# Patient Record
Sex: MALE | Race: OTHER | NOT HISPANIC OR LATINO | URBAN - METROPOLITAN AREA
[De-identification: names, ages, dates, MRNs, and addresses within clinical notes are randomized per-mention and may not be internally consistent; named-entity substitution may affect disease eponyms.]

---

## 2021-09-03 ENCOUNTER — INPATIENT (INPATIENT)
Facility: HOSPITAL | Age: 81
LOS: 1 days | Discharge: HOME | DRG: 603 | End: 2021-09-05
Payer: MEDICARE

## 2021-09-03 VITALS
TEMPERATURE: 98 F | DIASTOLIC BLOOD PRESSURE: 73 MMHG | SYSTOLIC BLOOD PRESSURE: 141 MMHG | OXYGEN SATURATION: 100 % | WEIGHT: 186.95 LBS | HEART RATE: 68 BPM | RESPIRATION RATE: 16 BRPM

## 2021-09-03 DIAGNOSIS — F32.9 MAJOR DEPRESSIVE DISORDER, SINGLE EPISODE, UNSPECIFIED: ICD-10-CM

## 2021-09-03 DIAGNOSIS — Z98.890 OTHER SPECIFIED POSTPROCEDURAL STATES: Chronic | ICD-10-CM

## 2021-09-03 DIAGNOSIS — I10 ESSENTIAL (PRIMARY) HYPERTENSION: ICD-10-CM

## 2021-09-03 DIAGNOSIS — I25.10 ATHEROSCLEROTIC HEART DISEASE OF NATIVE CORONARY ARTERY WITHOUT ANGINA PECTORIS: ICD-10-CM

## 2021-09-03 DIAGNOSIS — L03.116 CELLULITIS OF LEFT LOWER LIMB: ICD-10-CM

## 2021-09-03 DIAGNOSIS — G47.30 SLEEP APNEA, UNSPECIFIED: ICD-10-CM

## 2021-09-03 DIAGNOSIS — E87.6 HYPOKALEMIA: ICD-10-CM

## 2021-09-03 DIAGNOSIS — D64.9 ANEMIA, UNSPECIFIED: ICD-10-CM

## 2021-09-03 DIAGNOSIS — Z29.9 ENCOUNTER FOR PROPHYLACTIC MEASURES, UNSPECIFIED: ICD-10-CM

## 2021-09-03 DIAGNOSIS — K21.9 GASTRO-ESOPHAGEAL REFLUX DISEASE WITHOUT ESOPHAGITIS: ICD-10-CM

## 2021-09-03 DIAGNOSIS — N18.9 CHRONIC KIDNEY DISEASE, UNSPECIFIED: ICD-10-CM

## 2021-09-03 DIAGNOSIS — I50.9 HEART FAILURE, UNSPECIFIED: ICD-10-CM

## 2021-09-03 DIAGNOSIS — M19.90 UNSPECIFIED OSTEOARTHRITIS, UNSPECIFIED SITE: ICD-10-CM

## 2021-09-03 LAB
ALBUMIN SERPL ELPH-MCNC: 3.3 G/DL — SIGNIFICANT CHANGE UP (ref 3.3–5)
ALP SERPL-CCNC: 61 U/L — SIGNIFICANT CHANGE UP (ref 40–120)
ALT FLD-CCNC: 22 U/L — SIGNIFICANT CHANGE UP (ref 10–45)
ANION GAP SERPL CALC-SCNC: 10 MMOL/L — SIGNIFICANT CHANGE UP (ref 5–17)
APPEARANCE UR: CLEAR — SIGNIFICANT CHANGE UP
APTT BLD: 33.5 SEC — SIGNIFICANT CHANGE UP (ref 27.5–35.5)
AST SERPL-CCNC: 26 U/L — SIGNIFICANT CHANGE UP (ref 10–40)
BACTERIA # UR AUTO: SIGNIFICANT CHANGE UP /HPF
BASOPHILS # BLD AUTO: 0.02 K/UL — SIGNIFICANT CHANGE UP (ref 0–0.2)
BASOPHILS NFR BLD AUTO: 0.2 % — SIGNIFICANT CHANGE UP (ref 0–2)
BILIRUB SERPL-MCNC: 0.4 MG/DL — SIGNIFICANT CHANGE UP (ref 0.2–1.2)
BILIRUB UR-MCNC: NEGATIVE — SIGNIFICANT CHANGE UP
BUN SERPL-MCNC: 13 MG/DL — SIGNIFICANT CHANGE UP (ref 7–23)
CALCIUM SERPL-MCNC: 8.5 MG/DL — SIGNIFICANT CHANGE UP (ref 8.4–10.5)
CHLORIDE SERPL-SCNC: 102 MMOL/L — SIGNIFICANT CHANGE UP (ref 96–108)
CO2 SERPL-SCNC: 29 MMOL/L — SIGNIFICANT CHANGE UP (ref 22–31)
COLOR SPEC: YELLOW — SIGNIFICANT CHANGE UP
CREAT SERPL-MCNC: 1.56 MG/DL — HIGH (ref 0.5–1.3)
DIFF PNL FLD: ABNORMAL
EOSINOPHIL # BLD AUTO: 0.11 K/UL — SIGNIFICANT CHANGE UP (ref 0–0.5)
EOSINOPHIL NFR BLD AUTO: 1.1 % — SIGNIFICANT CHANGE UP (ref 0–6)
EPI CELLS # UR: SIGNIFICANT CHANGE UP /HPF (ref 0–5)
GLUCOSE SERPL-MCNC: 122 MG/DL — HIGH (ref 70–99)
GLUCOSE UR QL: NEGATIVE — SIGNIFICANT CHANGE UP
HCT VFR BLD CALC: 35.7 % — LOW (ref 39–50)
HGB BLD-MCNC: 11.7 G/DL — LOW (ref 13–17)
IMM GRANULOCYTES NFR BLD AUTO: 0.5 % — SIGNIFICANT CHANGE UP (ref 0–1.5)
INR BLD: 1.25 — HIGH (ref 0.88–1.16)
KETONES UR-MCNC: NEGATIVE — SIGNIFICANT CHANGE UP
LACTATE SERPL-SCNC: 1.5 MMOL/L — SIGNIFICANT CHANGE UP (ref 0.5–2)
LEUKOCYTE ESTERASE UR-ACNC: NEGATIVE — SIGNIFICANT CHANGE UP
LYMPHOCYTES # BLD AUTO: 1.41 K/UL — SIGNIFICANT CHANGE UP (ref 1–3.3)
LYMPHOCYTES # BLD AUTO: 14.7 % — SIGNIFICANT CHANGE UP (ref 13–44)
MAGNESIUM SERPL-MCNC: 1.6 MG/DL — SIGNIFICANT CHANGE UP (ref 1.6–2.6)
MCHC RBC-ENTMCNC: 29 PG — SIGNIFICANT CHANGE UP (ref 27–34)
MCHC RBC-ENTMCNC: 32.8 GM/DL — SIGNIFICANT CHANGE UP (ref 32–36)
MCV RBC AUTO: 88.6 FL — SIGNIFICANT CHANGE UP (ref 80–100)
MONOCYTES # BLD AUTO: 0.69 K/UL — SIGNIFICANT CHANGE UP (ref 0–0.9)
MONOCYTES NFR BLD AUTO: 7.2 % — SIGNIFICANT CHANGE UP (ref 2–14)
NEUTROPHILS # BLD AUTO: 7.33 K/UL — SIGNIFICANT CHANGE UP (ref 1.8–7.4)
NEUTROPHILS NFR BLD AUTO: 76.3 % — SIGNIFICANT CHANGE UP (ref 43–77)
NITRITE UR-MCNC: NEGATIVE — SIGNIFICANT CHANGE UP
NRBC # BLD: 0 /100 WBCS — SIGNIFICANT CHANGE UP (ref 0–0)
NT-PROBNP SERPL-SCNC: 609 PG/ML — HIGH (ref 0–300)
PH UR: 5.5 — SIGNIFICANT CHANGE UP (ref 5–8)
PLATELET # BLD AUTO: 236 K/UL — SIGNIFICANT CHANGE UP (ref 150–400)
POTASSIUM SERPL-MCNC: 3.2 MMOL/L — LOW (ref 3.5–5.3)
POTASSIUM SERPL-SCNC: 3.2 MMOL/L — LOW (ref 3.5–5.3)
PROT SERPL-MCNC: 7.3 G/DL — SIGNIFICANT CHANGE UP (ref 6–8.3)
PROT UR-MCNC: 30 MG/DL
PROTHROM AB SERPL-ACNC: 14.8 SEC — HIGH (ref 10.6–13.6)
RBC # BLD: 4.03 M/UL — LOW (ref 4.2–5.8)
RBC # FLD: 14.1 % — SIGNIFICANT CHANGE UP (ref 10.3–14.5)
RBC CASTS # UR COMP ASSIST: < 5 /HPF — SIGNIFICANT CHANGE UP
SARS-COV-2 RNA SPEC QL NAA+PROBE: NEGATIVE — SIGNIFICANT CHANGE UP
SODIUM SERPL-SCNC: 141 MMOL/L — SIGNIFICANT CHANGE UP (ref 135–145)
SP GR SPEC: 1.02 — SIGNIFICANT CHANGE UP (ref 1–1.03)
TROPONIN T SERPL-MCNC: 0.02 NG/ML — HIGH (ref 0–0.01)
UROBILINOGEN FLD QL: 1 E.U./DL — SIGNIFICANT CHANGE UP
WBC # BLD: 9.61 K/UL — SIGNIFICANT CHANGE UP (ref 3.8–10.5)
WBC # FLD AUTO: 9.61 K/UL — SIGNIFICANT CHANGE UP (ref 3.8–10.5)
WBC UR QL: < 5 /HPF — SIGNIFICANT CHANGE UP

## 2021-09-03 PROCEDURE — 93010 ELECTROCARDIOGRAM REPORT: CPT

## 2021-09-03 PROCEDURE — 93970 EXTREMITY STUDY: CPT | Mod: 26

## 2021-09-03 PROCEDURE — 71045 X-RAY EXAM CHEST 1 VIEW: CPT | Mod: 26

## 2021-09-03 PROCEDURE — 99223 1ST HOSP IP/OBS HIGH 75: CPT | Mod: GC,AI

## 2021-09-03 PROCEDURE — 99285 EMERGENCY DEPT VISIT HI MDM: CPT

## 2021-09-03 RX ORDER — METOPROLOL TARTRATE 50 MG
50 TABLET ORAL EVERY 12 HOURS
Refills: 0 | Status: DISCONTINUED | OUTPATIENT
Start: 2021-09-03 | End: 2021-09-05

## 2021-09-03 RX ORDER — DOXEPIN HCL 100 MG
10 CAPSULE ORAL
Qty: 0 | Refills: 0 | DISCHARGE

## 2021-09-03 RX ORDER — METOPROLOL TARTRATE 50 MG
50 TABLET ORAL
Qty: 0 | Refills: 0 | DISCHARGE

## 2021-09-03 RX ORDER — BACITRACIN ZINC 500 UNIT/G
1 OINTMENT IN PACKET (EA) TOPICAL EVERY 12 HOURS
Refills: 0 | Status: DISCONTINUED | OUTPATIENT
Start: 2021-09-03 | End: 2021-09-05

## 2021-09-03 RX ORDER — AMLODIPINE BESYLATE 2.5 MG/1
10 TABLET ORAL EVERY 24 HOURS
Refills: 0 | Status: DISCONTINUED | OUTPATIENT
Start: 2021-09-04 | End: 2021-09-05

## 2021-09-03 RX ORDER — POLYETHYLENE GLYCOL 3350 17 G/17G
17 POWDER, FOR SOLUTION ORAL EVERY 12 HOURS
Refills: 0 | Status: DISCONTINUED | OUTPATIENT
Start: 2021-09-03 | End: 2021-09-05

## 2021-09-03 RX ORDER — OMEGA-3 ACID ETHYL ESTERS 1 G
1 CAPSULE ORAL
Qty: 0 | Refills: 0 | DISCHARGE

## 2021-09-03 RX ORDER — CEFAZOLIN SODIUM 1 G
2000 VIAL (EA) INJECTION EVERY 8 HOURS
Refills: 0 | Status: DISCONTINUED | OUTPATIENT
Start: 2021-09-03 | End: 2021-09-05

## 2021-09-03 RX ORDER — POTASSIUM CHLORIDE 20 MEQ
40 PACKET (EA) ORAL ONCE
Refills: 0 | Status: COMPLETED | OUTPATIENT
Start: 2021-09-03 | End: 2021-09-03

## 2021-09-03 RX ORDER — ATORVASTATIN CALCIUM 80 MG/1
40 TABLET, FILM COATED ORAL AT BEDTIME
Refills: 0 | Status: DISCONTINUED | OUTPATIENT
Start: 2021-09-03 | End: 2021-09-05

## 2021-09-03 RX ORDER — INFLUENZA VIRUS VACCINE 15; 15; 15; 15 UG/.5ML; UG/.5ML; UG/.5ML; UG/.5ML
0.5 SUSPENSION INTRAMUSCULAR ONCE
Refills: 0 | Status: DISCONTINUED | OUTPATIENT
Start: 2021-09-03 | End: 2021-09-03

## 2021-09-03 RX ORDER — PIPERACILLIN AND TAZOBACTAM 4; .5 G/20ML; G/20ML
3.38 INJECTION, POWDER, LYOPHILIZED, FOR SOLUTION INTRAVENOUS ONCE
Refills: 0 | Status: COMPLETED | OUTPATIENT
Start: 2021-09-03 | End: 2021-09-03

## 2021-09-03 RX ORDER — OMEPRAZOLE 10 MG/1
1 CAPSULE, DELAYED RELEASE ORAL
Qty: 0 | Refills: 0 | DISCHARGE

## 2021-09-03 RX ORDER — SENNA PLUS 8.6 MG/1
1 TABLET ORAL AT BEDTIME
Refills: 0 | Status: DISCONTINUED | OUTPATIENT
Start: 2021-09-03 | End: 2021-09-05

## 2021-09-03 RX ORDER — ASPIRIN/CALCIUM CARB/MAGNESIUM 324 MG
1 TABLET ORAL
Qty: 0 | Refills: 0 | DISCHARGE

## 2021-09-03 RX ORDER — ATORVASTATIN CALCIUM 80 MG/1
1 TABLET, FILM COATED ORAL
Qty: 0 | Refills: 0 | DISCHARGE

## 2021-09-03 RX ORDER — INFLUENZA VIRUS VACCINE 15; 15; 15; 15 UG/.5ML; UG/.5ML; UG/.5ML; UG/.5ML
0.7 SUSPENSION INTRAMUSCULAR ONCE
Refills: 0 | Status: DISCONTINUED | OUTPATIENT
Start: 2021-09-03 | End: 2021-09-05

## 2021-09-03 RX ORDER — VANCOMYCIN HCL 1 G
1000 VIAL (EA) INTRAVENOUS ONCE
Refills: 0 | Status: COMPLETED | OUTPATIENT
Start: 2021-09-03 | End: 2021-09-03

## 2021-09-03 RX ORDER — ACETAMINOPHEN 500 MG
650 TABLET ORAL EVERY 6 HOURS
Refills: 0 | Status: DISCONTINUED | OUTPATIENT
Start: 2021-09-03 | End: 2021-09-05

## 2021-09-03 RX ORDER — ENOXAPARIN SODIUM 100 MG/ML
40 INJECTION SUBCUTANEOUS EVERY 24 HOURS
Refills: 0 | Status: DISCONTINUED | OUTPATIENT
Start: 2021-09-03 | End: 2021-09-05

## 2021-09-03 RX ORDER — PANTOPRAZOLE SODIUM 20 MG/1
40 TABLET, DELAYED RELEASE ORAL
Refills: 0 | Status: DISCONTINUED | OUTPATIENT
Start: 2021-09-03 | End: 2021-09-05

## 2021-09-03 RX ORDER — ASPIRIN/CALCIUM CARB/MAGNESIUM 324 MG
325 TABLET ORAL DAILY
Refills: 0 | Status: DISCONTINUED | OUTPATIENT
Start: 2021-09-03 | End: 2021-09-05

## 2021-09-03 RX ADMIN — Medication 250 MILLIGRAM(S): at 14:30

## 2021-09-03 RX ADMIN — SENNA PLUS 1 TABLET(S): 8.6 TABLET ORAL at 21:20

## 2021-09-03 RX ADMIN — ATORVASTATIN CALCIUM 40 MILLIGRAM(S): 80 TABLET, FILM COATED ORAL at 21:20

## 2021-09-03 RX ADMIN — Medication 40 MILLIEQUIVALENT(S): at 14:31

## 2021-09-03 RX ADMIN — PIPERACILLIN AND TAZOBACTAM 200 GRAM(S): 4; .5 INJECTION, POWDER, LYOPHILIZED, FOR SOLUTION INTRAVENOUS at 14:31

## 2021-09-03 RX ADMIN — ENOXAPARIN SODIUM 40 MILLIGRAM(S): 100 INJECTION SUBCUTANEOUS at 19:14

## 2021-09-03 RX ADMIN — Medication 100 MILLIGRAM(S): at 18:45

## 2021-09-03 RX ADMIN — Medication 50 MILLIGRAM(S): at 19:14

## 2021-09-03 RX ADMIN — Medication 0.1 MILLIGRAM(S): at 21:20

## 2021-09-03 RX ADMIN — Medication 1 APPLICATION(S): at 22:04

## 2021-09-03 NOTE — H&P ADULT - PROBLEM SELECTOR PLAN 5
Pt w/ hx of chronic renal insufficiency (unknown baseline Cr) and Cr 1.56 on admission. UA negative. b/l LE edema L>R. no JVD or hepatojuglar reflux.   - monitor Cr  - urine lytes Pt w/ hx of chronic renal insufficiency (unknown baseline Cr, family thinks it may be ~1.5) and Cr 1.56 on admission. UA negative. b/l LE edema L>R. no JVD or hepatojuglar reflux. Likely chronic but further evaluation for possible acute change.  - monitor Cr  - f/u urine lytes

## 2021-09-03 NOTE — H&P ADULT - PROBLEM SELECTOR PLAN 1
Pt presents w/ left LE pain and worsened b/l LE swelling. Found to have LLE nonpurulent cellulitis. US LE doppler negative. Received vanc/zosyn x1 in ED.   - start cefazolin   - monitor   - bacitracin to ulcer of lateral ankle Pt presents w/ left LE pain and worsened b/l LE swelling. Found to have LLE nonpurulent cellulitis. US LE doppler negative. Received vanc/zosyn x1 in ED.   - start cefazolin   - f/u BCx collected in ED 9/3  - monitor   - bacitracin to ulcer of lateral ankle Pt presents w/ left LE pain and worsened b/l LE swelling. Found to have LLE nonpurulent cellulitis. US LE doppler negative. Received vanc/zosyn x1 in ED.   - start cefazolin 2g IV q8h  - f/u BCx collected in ED 9/3  - monitor   - bacitracin to ulcer of lateral ankle Pt presents w/ left LE pain and worsened b/l LE swelling. Found to have LLE nonpurulent cellulitis. US LE doppler negative. Received vanc/zosyn x1 in ED.   - start cefazolin 2g IV q8h (9/3-)  - f/u BCx collected in ED 9/3  - monitor cellulitis improvement while on antibiotics  - bacitracin ointment to small ulcer of left lateral ankle

## 2021-09-03 NOTE — ED ADULT NURSE NOTE - OBJECTIVE STATEMENT
Pt c/o worsening swelling in bilateral lower extremities for 1 week, w/ pain in both legs. States swelling has been on and off for "years". Pt endorses chronic pain with arthritis in both legs but as per daughter, worsening pain with standing for long periods of time. Pt uses walker at baseline.     Denies any HA, dizziness, CP, SOB, N/V, urinary symptoms. Pt c/o worsening swelling in bilateral lower extremities for 1 week, w/ pain in both legs. States swelling has been on and off for "years". Pt endorses chronic pain with arthritis in both legs but as per daughter, worsening pain with standing for long periods of time. Pt uses walker at baseline.     Of note: pt used BioFreeze on bilateral legs for arthritis about 3-4 days ago.     Denies any HA, dizziness, CP, SOB, N/V, urinary symptoms.

## 2021-09-03 NOTE — H&P ADULT - ATTENDING COMMENTS
Patient was seen and examined with the resident team today.  I agree with Dr. Lees's assessment and plan with the following exceptions/additions:     Briefly, 81yo gentleman, former smoker, with a PMH of HTN, HLD, GERD, CAD, CHF NOS, CKD (baseline Cr 1.4-1.5), chronic hypokalemia, BPH, chronic RUE weakness/contracture and sleeping disorder NOS (on CPAP) who p/w BLE swelling (L>R), found to have LLE cellulitis.    #LLE cellulitis - agree w/Cefazolin, f/u blood cultures  #Refractory HTN, ?HFpEF - on 4 agents at home, resume all but HCTZ   #CAD - c/w home ASA and statin   #CKD NOS - partner "thinks" his baseline Cr approx 1.5, will hold HCTZ and f/u AM Cr before restarting HCTZ in case he has an KALA on CKD  #Sleep Disorder - can offer CPAP while in-house  #DVT PPx - Lovenox  #Dispo - likely home tomorrow on PO abx if skin exam improving (pt and partner amenable to this plan)    Rosmery Zarate  [phone]

## 2021-09-03 NOTE — H&P ADULT - PROBLEM SELECTOR PLAN 12
F: tolerating PO, no IVF  E: replete K<4, Mg<2  N: DASH    VTE Prophylaxis: Lovenox 40mg q24h  GI: protonix PO  C: Full Code  D: f/u PT eval F: tolerating PO, no IVF  E: replete K<4, Mg<2  N: DASH    VTE Prophylaxis: Lovenox 40mg q24h  GI: protonix PO  C: Full Code  D: home

## 2021-09-03 NOTE — H&P ADULT - PROBLEM SELECTOR PLAN 11
F: tolerating PO, no IVF  E: replete K<4, Mg<2  N: DASH    VTE Prophylaxis: Lovenox 40mg q24h  GI: protonix PO  C: Full Code  D: f/u PT eval Pt reports that last Friday he started using a device while sleeping because he has difficulty sleeping. He is not able to specify what type of device he uses. He does not use the device every day because he is not comfortable using it while he sleeps.  - offer CPAP use at night Pt reports that last Friday he received a CPAP device because he has difficulty sleeping. He does not use the device every day because he is not comfortable using it while he sleeps.  - offer CPAP use at night Pt reports that last Friday he received a CPAP device because he has difficulty sleeping. He does not use the device every day because he is not comfortable using it while he sleeps.  - patient unsure of CPAP settings  - instruct pt to follow up with his sleep specialist

## 2021-09-03 NOTE — H&P ADULT - NSICDXPASTMEDICALHX_GEN_ALL_CORE_FT
PAST MEDICAL HISTORY:  Arthritis     CAD (coronary artery disease)     Cellulitis, leg     Chronic CHF     Chronic renal insufficiency     Constipation     Depression     GERD (gastroesophageal reflux disease)     HTN (hypertension)       Anxiety     CAD (coronary artery disease)     Chronic renal insufficiency     Chronic systolic congestive heart failure     Constipation     Depression     GERD (gastroesophageal reflux disease)     History of BPH     HLD (hyperlipidemia)     HTN (hypertension)

## 2021-09-03 NOTE — H&P ADULT - PROBLEM SELECTOR PLAN 10
Pt w/ hx of arthritis and associated pain. Uses biofreeze topical at home on b/l knees. Also w/ b/l shoulder arthritic pain.  - lidocaine topical Pt w/ hx of arthritis and associated pain. Uses biofreeze topical at home on b/l knees. Also w/ b/l shoulder arthritic pain.  - tylenol PO q6 prn for pain

## 2021-09-03 NOTE — H&P ADULT - NSHPLABSRESULTS_GEN_ALL_CORE
LABS:                        11.7   9.61  )-----------( 236      ( 03 Sep 2021 11:25 )             35.7         141  |  102  |  13  ----------------------------<  122<H>  3.2<L>   |  29  |  1.56<H>    Ca    8.5      03 Sep 2021 11:25  Mg     1.6         TPro  7.3  /  Alb  3.3  /  TBili  0.4  /  DBili  x   /  AST  26  /  ALT  22  /  AlkPhos  61  03    PT/INR - ( 03 Sep 2021 11:25 )   PT: 14.8 sec;   INR: 1.25          PTT - ( 03 Sep 2021 11:25 )  PTT:33.5 sec  Urinalysis Basic - ( 03 Sep 2021 12:06 )    Color: Yellow / Appearance: Clear / S.020 / pH: x  Gluc: x / Ketone: NEGATIVE  / Bili: Negative / Urobili: 1.0 E.U./dL   Blood: x / Protein: 30 mg/dL / Nitrite: NEGATIVE   Leuk Esterase: NEGATIVE / RBC: < 5 /HPF / WBC < 5 /HPF   Sq Epi: x / Non Sq Epi: 0-5 /HPF / Bacteria: None /HPF      RADIOLOGY & ADDITIONAL TESTS: Reviewed.    LE doppler b/l: no DVT  CXR: pulmonary edema b/l

## 2021-09-03 NOTE — ED PROVIDER NOTE - NSICDXPASTMEDICALHX_GEN_ALL_CORE_FT
PAST MEDICAL HISTORY:  Anxiety     CAD (coronary artery disease)     Chronic renal insufficiency     Chronic systolic congestive heart failure     Constipation     Depression     GERD (gastroesophageal reflux disease)     History of BPH     HLD (hyperlipidemia)     HTN (hypertension)

## 2021-09-03 NOTE — ED PROVIDER NOTE - CLINICAL SUMMARY MEDICAL DECISION MAKING FREE TEXT BOX
79 y/o M w/presents for swelling of bilat lower extremities, left greater than right, w/ erythema and pain to left lower extremity. Pt reports after he put OTC Biofreeze roll-on on the left knee and leg for pain and states after which, left leg has blister and erythema, and pain and warmth w/ swelling. Order cellulitis workup w/ labs, blood work, blood culture, US and doppler of both legs to rule out DVT. Order CXR. Will give IV Zosyn and vancomycin for infection. Pt to be admitted for IV abx for cellulitis. 81 y/o M w/presents for swelling of bilat lower extremities, left greater than right, w/ erythema and pain to left lower extremity. Pt reports after he put OTC Biofreeze roll-on on the left knee and leg for pain and states after which, left leg has blister and erythema, and pain and warmth w/ swelling. Ordered blood work, blood cultures, US and doppler of both legs to rule out DVT. Order CXR. Will give IV Zosyn and vancomycin for cellulitis. Pt afebrile. Labs/ studies noted. Doppler negative for DVT. Pt admitted for IV abx for cellulitis.

## 2021-09-03 NOTE — H&P ADULT - ASSESSMENT
Patient is an 79yo male w/ PMH of HTN (on metoprolol, clonidine, HCTZ, amlodipine), CHF (unknown baseline EF), CAD (pt denies hx of MI, last cardiac cath 15 yrs ago, on aspirin 325 mg), HLD, GERD, depression and anxiety (on Doxepin), hypokalemia (on K 20mg), constipation (takes a stool softener at home), and chronic renal insufficiency (unknown baseline Cr) who presented for swelling of b/l lower extremities and pain of the left lower extremity for 1 week. Admitted for LLE nonpurulent cellulitis.

## 2021-09-03 NOTE — H&P ADULT - PROBLEM SELECTOR PLAN 7
Pt w/ hx of hypokalemia (takes potassium 20 at home).  - monitor BMP  - replete K as needed Pt w/ hx of hypokalemia (takes potassium 20 at home). Likely 2/2 HCTZ use.  - monitor BMP  - replete K as needed Pt w/ hx of hypokalemia (takes potassium at home). Likely 2/2 HCTZ use.  - monitor BMP  - replete K as needed

## 2021-09-03 NOTE — ED PROVIDER NOTE - CONSTITUTIONAL, MLM
Awake, alert, oriented to person, place, time/situation and in no apparent distress. normal... Awake, alert, oriented and in no apparent distress.

## 2021-09-03 NOTE — ED PROVIDER NOTE - OBJECTIVE STATEMENT
79 y/o M w/ Hx of HTN (on metoprolol, clonidine, HCTZ, amlodipine), HLD (on atorvastatin) , GERD (on Omeprazole), BPH, constipation, CHF, CAD, on aspirin 325 mg daily, depression and anxiety (on Doxepin), hypokalemia (on K 20mg), chronic renal insufficiency, presents today for swelling of bilat lower extremities, left greater than right, w/ erythema and pain to left lower extremity for 3-4 days. Pt states he has chronic arthritis to bilat knees, left greater than right. Complains that knee pain radiated down to leg and put OTC Biofreeze roll-on (Menthol) on the left knee. Also put on left leg due to the radiating pain to left leg. After Biofreeze application, reports leg w/ erythema, blister, oozing clear fluids, and pain and warmth to left lower extremity and swelling. Denies fever, chills. Fully vaccinated for COVID19 (March, 2021). Denies CP, SOB, cough, N/V/D, abd pain, or urinary symptoms. Pt reports he smokes marijuana daily, occasional cigars, past cigarette smoker (more than 15 yrs ago), no other drugs use or alcohol intake.

## 2021-09-03 NOTE — H&P ADULT - NSHPSOCIALHISTORY_GEN_ALL_CORE
Lives in an apartment with his partner Janiya.  Also has a daughter who lives in the area.  Ambulates with a walker and is able to walk multiple blocks without SOB.  Sleeps flat with 1 pillow.  Prescribed a sleep device at home which he received 1 week ago but does not use it every day because it is not comfortable for him.  Denies alcohol use.  Former smoker, stopped 15 years ago, used to smoke 1 PPD for 25yrs.  Occasionally smokes marijuana and denies other drug use.

## 2021-09-03 NOTE — H&P ADULT - PROBLEM SELECTOR PLAN 6
Pt w/ hx of chronic renal insufficiency (unknown baseline Cr) and Hgb 11.7 on admission. No overt signs of bleeding. Likely AOCD.  - Fe studies Pt w/ hx of chronic renal insufficiency (unknown baseline Cr) and Hgb 11.7 on admission. No overt signs of bleeding. Likely AOCD.  - f/u Fe studies

## 2021-09-03 NOTE — H&P ADULT - PROBLEM SELECTOR PLAN 2
Pt w/ hx of HTN and reports previous hospitalizations (20yrs ago) for uncontrolled BP. Home meds: metoprolol 50mg BID, clonidine 0.1mg BID, norvasc 10mg qd, HCTZ 12.5mg qd.  - c/w metoprolol   - c/w clonidine  - c/w norvasc  - c/w HCTZ Pt w/ hx of HTN and reports previous hospitalizations (20yrs ago) for uncontrolled BP. Home meds: metoprolol succinate 50mg BID, clonidine 0.1mg BID, norvasc 10mg qd, HCTZ 12.5mg qd.  - c/w toprol  - c/w clonidine  - c/w norvasc  - c/w HCTZ Pt w/ hx of HTN and reports previous hospitalizations (20yrs ago) for uncontrolled BP. Home meds: metoprolol succinate 50mg BID, clonidine 0.1mg BID, norvasc 10mg qd, HCTZ 12.5mg qd.  - c/w toprol  - c/w clonidine  - c/w norvasc  - HOLD HCTZ in setting of elevated Cr (1.56 today, possible baseline 1.5 as per family)  - f/u AM Cr before restarting HCTZ

## 2021-09-03 NOTE — ED PROVIDER NOTE - MUSCULOSKELETAL, MLM
Bilat lower extremity swelling, left greater than right. Erythema, warmth, and scattered blisters (some weeping clear fluids) noted over left lower leg. Erythema that extends from top of foot  to 2/3 up leg, circumferencial. Good pulses bilat. Compartments soft.  Tender to palpations over left leg. Leg has full ROM. Pt able to ambulate w/ walker and ambulating in ER.  1 cm ulcer noted to be 2 inches above lateral malleolus. Bilat lower extremity swelling, left greater than right. Good pulses bilat. Compartments soft.  Tender to palpations over left leg. Leg has full ROM. Pt able to ambulate w/ walker and ambulating in ER.

## 2021-09-03 NOTE — H&P ADULT - NSHPPHYSICALEXAM_GEN_ALL_CORE
PHYSICAL EXAM:  Constitutional: sitting up in bed, NAD  HEENT: ELIZABETH, oral cavity wnl, poor dentition  Neck: supple, normal thyroid, no cervical lymphadenopathy  Back: no CVA tenderness  Respiratory: few rales b/l bases, no rhonchi  Cardiovascular: RRR, no M/R/G  Gastrointestinal: soft, nontender, normal bowel sounds, no guarding, no rebound  Extremities: moving all extremitiesx4, b/l LE edema L>R  Vascular: 2+ radial, carotid pulses b/l, 2+ DP pulse RLE (unable to assess LLE DP pulse due to swelling)  Neurological: AAOx3, CN II-XII intact, strength 5/5 b/l UE and LE, sensation to light touch intact b/l face/UE/LE  Skin: LLE swelling up to knee and erythema below knee, mildly warm to touch, no drainage, purulence or fluctuance; 2cm circular ulcer of lateral left ankle  Psychiatric: appropriate, calm

## 2021-09-03 NOTE — H&P ADULT - PROBLEM SELECTOR PLAN 3
Pt w/ hx of CHF (unknown baseline EF). Few b/l rales, LE edema L>R, . CXR w/ b/l pulm edema.  - c/w HCTZ  - TTE Pt w/ hx of CHF (unknown baseline EF). CXR w/ b/l pulm edema. Few b/l rales, LE edema L>R, . O2 sat 96 RA, denies SOB.   - HOLD HCTZ in setting of elevated Cr (1.56 today, possible baseline 1.5 as per family)  - f/u AM Cr before restarting HCTZ  - can refer patient to outpatient cardiologist as he does not currently see one

## 2021-09-03 NOTE — H&P ADULT - PROBLEM SELECTOR PLAN 4
Pt w/ hx of CAD (last cardiac cath 15yrs ago, pt denies hx of MI or stent). EKG NSR w/ LBBB. Home med: aspirin 325mg qd.  - c/w aspirin Pt w/ hx of CAD (last cardiac cath 15yrs ago, pt denies hx of MI or stent). EKG NSR w/ RBBB. Home med: aspirin 325mg qd.  - c/w aspirin Pt w/ hx of CAD (last cardiac cath 15yrs ago, pt denies hx of MI or stent). EKG NSR w/ RBBB. Home med: aspirin 325mg qd, atorvastatin 40mg qd.  - c/w aspirin and lipitor Pt w/ hx of CAD (last cardiac cath 15yrs ago, pt denies hx of MI or stent). EKG NSR w/ RBBB, no ischemic changes. Trop 0.02. Denies CP, SOB, palpiations, n/v. Home med: aspirin 325mg qd, atorvastatin 40mg qd.  - c/w aspirin and lipitor Pt w/ hx of CAD (last cardiac cath 15yrs ago, pt denies hx of MI or stent). EKG NSR w/ RBBB, no ischemic changes. Trop 0.02. Denies CP, SOB, palpiations, n/v. Home med: aspirin 325mg qd, atorvastatin 40mg qd.  - c/w aspirin and lipitor qd

## 2021-09-03 NOTE — ED PROVIDER NOTE - SKIN, MLM
Skin normal color for race, warm, dry and intact. No evidence of rash. Erythema, warmth, and scattered blisters (some weeping clear fluids) noted over left lower leg. Erythema that extends from top of foot  to 2/3 up leg, circumferential. 1 cm ulcer noted to be 2 inches above lateral malleolus.

## 2021-09-03 NOTE — H&P ADULT - HISTORY OF PRESENT ILLNESS
Patient is an 81yo male w/ PMH of HTN (on metoprolol, clonidine, HCTZ, amlodipine), CHF (unknown baseline EF), CAD (pt denies hx of MI, last cardiac cath 15 yrs ago, on aspirin 325 mg), HLD, GERD, depression and anxiety (on Doxepin), hypokalemia (on K 20mg), constipation (takes a stool softener at home), and chronic renal insufficiency (unknown baseline Cr) who presented for swelling of b/l lower extremities and pain of the left lower extremity for 1 week. The patient reports that he has b/l arthritic knee pain but that the pain in his left knee seemed to spread downwards starting 1 week ago, is intermittent and at its worst a 4/10; he put biofreeze (which he uses for arthritic pain) all over the b/l lower extremities because of the pain. He also reports that he has had swelling of this legs for many years but that it got worse on Monday. His partner, Janiya, reports that he also had a blister that popped open yesterday on his left ankle. Pt denies SOB, CP, palpitations, fevers, chills, nausea, vomiting, abd pain, diarrhea, tingling/burning of the extremities.    ED course:  vitals- afebrile, HR 66, /73, RR 16, O2sat 100 on RA  labs wbc 9.6, Hgb 11.7, Cr 1.56, trop 0.02,   LE US doppler - no DVT b/l  CXR - b/l pulmonary edema  EKG - NSR w/ LBBB Patient is an 79yo male w/ PMH of HTN (on metoprolol, clonidine, HCTZ, amlodipine), CHF (unknown baseline EF), CAD (pt denies hx of MI, last cardiac cath 15 yrs ago, on aspirin 325 mg), HLD, GERD, depression and anxiety (on Doxepin), hypokalemia (on K 20mg), constipation (takes a stool softener at home), and chronic renal insufficiency (unknown baseline Cr) who presented for swelling of b/l lower extremities and pain of the left lower extremity for 1 week. The patient reports that he has b/l arthritic knee pain but that the pain in his left knee seemed to spread downwards starting 1 week ago, is intermittent and at its worst a 4/10; he put biofreeze (which he uses for arthritic pain) all over the b/l lower extremities because of the pain. He also reports that he has had swelling of this legs for many years but that it got worse on Monday. His partner, Janiya, reports that he also had a blister that popped open yesterday on his left ankle. Pt denies SOB, CP, palpitations, fevers, chills, nausea, vomiting, abd pain, diarrhea, tingling/burning of the extremities.    ED course:  vitals- afebrile, HR 66, /73, RR 16, O2sat 100 on RA  labs wbc 9.6, Hgb 11.7, Cr 1.56, trop 0.02,   LE US doppler - no DVT b/l  CXR - b/l pulmonary edema  EKG - NSR w/ RBBB Patient is an 79yo male w/ PMH of HTN (on metoprolol, clonidine, HCTZ, amlodipine), CHF (unknown baseline EF), CAD (pt denies hx of MI, last cardiac cath 15 yrs ago, on aspirin 325 mg), HLD, GERD, depression and anxiety (on Doxepin), hypokalemia (on K 20mg), constipation (takes a stool softener at home), and chronic renal insufficiency (unknown baseline Cr) who presented for swelling of b/l lower extremities and pain of the left lower extremity for 1 week. The patient reports that he has b/l arthritic knee pain but that the pain in his left knee seemed to spread downwards starting 1 week ago, is intermittent and at its worst a 4/10; he put biofreeze (which he uses for arthritic pain) all over the b/l lower extremities because of the pain. He also reports that he has had swelling of this legs for many years but that it got worse on Monday. His partner, Janiya, reports that he also had a blister that popped open yesterday on his left ankle. Pt denies SOB, CP, palpitations, fevers, chills, nausea, vomiting, abd pain, diarrhea, tingling/burning of the extremities.    ED course: s/p vanc x1, zosyn x1  vitals- afebrile, HR 66, /73, RR 16, O2sat 100 on RA  labs wbc 9.6, Hgb 11.7, Cr 1.56, trop 0.02,   LE US doppler - no DVT b/l  CXR - b/l pulmonary edema  EKG - NSR w/ RBBB Patient is an 79yo male w/ PMH of HTN (on metoprolol, clonidine, HCTZ, amlodipine), CHF (unknown baseline EF), CAD (pt denies hx of MI, last cardiac cath 15 yrs ago, on aspirin 325 mg), HLD, GERD, depression and anxiety (on Doxepin), hypokalemia (on K 20mg), constipation (takes a stool softener at home), and chronic renal insufficiency (unknown baseline Cr), sleep apnea (received a sleep device 1wk ago, not yet acclimated with it) who presented for swelling of b/l lower extremities and pain of the left lower extremity for 1 week. The patient reports that he has b/l arthritic knee pain but that the pain in his left knee seemed to spread downwards starting 1 week ago, is intermittent and at its worst a 4/10; he put biofreeze (which he uses for arthritic pain) all over the b/l lower extremities because of the pain. He also reports that he has had swelling of this legs for many years but that it got worse on Monday. His partner, Janiya, reports that he also had a blister that popped open yesterday on his left ankle. Pt denies SOB, CP, palpitations, fevers, chills, nausea, vomiting, abd pain, diarrhea, tingling/burning of the extremities.    ED course: s/p vanc x1, zosyn x1  vitals- afebrile, HR 66, /73, RR 16, O2sat 100 on RA  labs wbc 9.6, Hgb 11.7, Cr 1.56, trop 0.02,   LE US doppler - no DVT b/l  CXR - b/l pulmonary edema  EKG - NSR w/ RBBB

## 2021-09-03 NOTE — H&P ADULT - PROBLEM SELECTOR PLAN 9
Pt w/ hx of GERD. Pt denies abd pain, sore throat, dysphagia. Home med: omeprazole 20mg qd  - start protonix PO Pt w/ hx of GERD. Pt denies abd pain, sore throat, dysphagia. Home med: omeprazole 20mg qd.  - start protonix PO

## 2021-09-03 NOTE — H&P ADULT - PROBLEM SELECTOR PLAN 8
Pt w/ hx of depression and anxiety. Home med: doxepin 10mg qhs.  - c/w doxepin Pt w/ hx of depression and anxiety? (pt denies psychiatric history). Home med: doxepin 10mg qhs.  - c/w doxepin 10mg qhs Pt w/ hx of depression and anxiety? (pt denies psychiatric history). Home med: doxepin 10mg qhs.  - confirm doxepin usage at home

## 2021-09-04 LAB
A1C WITH ESTIMATED AVERAGE GLUCOSE RESULT: 5.3 % — SIGNIFICANT CHANGE UP (ref 4–5.6)
ALBUMIN SERPL ELPH-MCNC: 3.3 G/DL — SIGNIFICANT CHANGE UP (ref 3.3–5)
ALP SERPL-CCNC: 70 U/L — SIGNIFICANT CHANGE UP (ref 40–120)
ALT FLD-CCNC: 22 U/L — SIGNIFICANT CHANGE UP (ref 10–45)
ANION GAP SERPL CALC-SCNC: 12 MMOL/L — SIGNIFICANT CHANGE UP (ref 5–17)
APTT BLD: 37 SEC — HIGH (ref 27.5–35.5)
AST SERPL-CCNC: 29 U/L — SIGNIFICANT CHANGE UP (ref 10–40)
BASOPHILS # BLD AUTO: 0.03 K/UL — SIGNIFICANT CHANGE UP (ref 0–0.2)
BASOPHILS NFR BLD AUTO: 0.3 % — SIGNIFICANT CHANGE UP (ref 0–2)
BILIRUB SERPL-MCNC: 0.6 MG/DL — SIGNIFICANT CHANGE UP (ref 0.2–1.2)
BUN SERPL-MCNC: 11 MG/DL — SIGNIFICANT CHANGE UP (ref 7–23)
CALCIUM SERPL-MCNC: 9.2 MG/DL — SIGNIFICANT CHANGE UP (ref 8.4–10.5)
CHLORIDE SERPL-SCNC: 100 MMOL/L — SIGNIFICANT CHANGE UP (ref 96–108)
CO2 SERPL-SCNC: 28 MMOL/L — SIGNIFICANT CHANGE UP (ref 22–31)
CREAT SERPL-MCNC: 1.43 MG/DL — HIGH (ref 0.5–1.3)
CULTURE RESULTS: SIGNIFICANT CHANGE UP
EOSINOPHIL # BLD AUTO: 0.07 K/UL — SIGNIFICANT CHANGE UP (ref 0–0.5)
EOSINOPHIL NFR BLD AUTO: 0.7 % — SIGNIFICANT CHANGE UP (ref 0–6)
ESTIMATED AVERAGE GLUCOSE: 105 MG/DL — SIGNIFICANT CHANGE UP (ref 68–114)
FERRITIN SERPL-MCNC: 520 NG/ML — HIGH (ref 30–400)
GLUCOSE SERPL-MCNC: 94 MG/DL — SIGNIFICANT CHANGE UP (ref 70–99)
HCT VFR BLD CALC: 40.6 % — SIGNIFICANT CHANGE UP (ref 39–50)
HGB BLD-MCNC: 13.3 G/DL — SIGNIFICANT CHANGE UP (ref 13–17)
IMM GRANULOCYTES NFR BLD AUTO: 0.5 % — SIGNIFICANT CHANGE UP (ref 0–1.5)
INR BLD: 1.25 — HIGH (ref 0.88–1.16)
IRON SATN MFR SERPL: 33 % — SIGNIFICANT CHANGE UP (ref 16–55)
IRON SATN MFR SERPL: 48 UG/DL — SIGNIFICANT CHANGE UP (ref 45–165)
LYMPHOCYTES # BLD AUTO: 1.34 K/UL — SIGNIFICANT CHANGE UP (ref 1–3.3)
LYMPHOCYTES # BLD AUTO: 13.8 % — SIGNIFICANT CHANGE UP (ref 13–44)
MAGNESIUM SERPL-MCNC: 1.4 MG/DL — LOW (ref 1.6–2.6)
MCHC RBC-ENTMCNC: 28.9 PG — SIGNIFICANT CHANGE UP (ref 27–34)
MCHC RBC-ENTMCNC: 32.8 GM/DL — SIGNIFICANT CHANGE UP (ref 32–36)
MCV RBC AUTO: 88.3 FL — SIGNIFICANT CHANGE UP (ref 80–100)
MONOCYTES # BLD AUTO: 0.71 K/UL — SIGNIFICANT CHANGE UP (ref 0–0.9)
MONOCYTES NFR BLD AUTO: 7.3 % — SIGNIFICANT CHANGE UP (ref 2–14)
NEUTROPHILS # BLD AUTO: 7.48 K/UL — HIGH (ref 1.8–7.4)
NEUTROPHILS NFR BLD AUTO: 77.4 % — HIGH (ref 43–77)
NRBC # BLD: 0 /100 WBCS — SIGNIFICANT CHANGE UP (ref 0–0)
PHOSPHATE SERPL-MCNC: 2.2 MG/DL — LOW (ref 2.5–4.5)
PLATELET # BLD AUTO: 274 K/UL — SIGNIFICANT CHANGE UP (ref 150–400)
POTASSIUM SERPL-MCNC: 3.4 MMOL/L — LOW (ref 3.5–5.3)
POTASSIUM SERPL-SCNC: 3.4 MMOL/L — LOW (ref 3.5–5.3)
PROT SERPL-MCNC: 8.3 G/DL — SIGNIFICANT CHANGE UP (ref 6–8.3)
PROTHROM AB SERPL-ACNC: 14.8 SEC — HIGH (ref 10.6–13.6)
RBC # BLD: 4.6 M/UL — SIGNIFICANT CHANGE UP (ref 4.2–5.8)
RBC # FLD: 14.1 % — SIGNIFICANT CHANGE UP (ref 10.3–14.5)
SODIUM SERPL-SCNC: 140 MMOL/L — SIGNIFICANT CHANGE UP (ref 135–145)
SPECIMEN SOURCE: SIGNIFICANT CHANGE UP
TIBC SERPL-MCNC: 144 UG/DL — LOW (ref 220–430)
UIBC SERPL-MCNC: 96 UG/DL — LOW (ref 110–370)
WBC # BLD: 9.68 K/UL — SIGNIFICANT CHANGE UP (ref 3.8–10.5)
WBC # FLD AUTO: 9.68 K/UL — SIGNIFICANT CHANGE UP (ref 3.8–10.5)

## 2021-09-04 PROCEDURE — 99233 SBSQ HOSP IP/OBS HIGH 50: CPT | Mod: GC

## 2021-09-04 RX ORDER — HYDROCHLOROTHIAZIDE 25 MG
12.5 TABLET ORAL EVERY 24 HOURS
Refills: 0 | Status: DISCONTINUED | OUTPATIENT
Start: 2021-09-04 | End: 2021-09-05

## 2021-09-04 RX ORDER — MAGNESIUM SULFATE 500 MG/ML
1 VIAL (ML) INJECTION ONCE
Refills: 0 | Status: COMPLETED | OUTPATIENT
Start: 2021-09-04 | End: 2021-09-04

## 2021-09-04 RX ORDER — POTASSIUM CHLORIDE 20 MEQ
40 PACKET (EA) ORAL ONCE
Refills: 0 | Status: DISCONTINUED | OUTPATIENT
Start: 2021-09-04 | End: 2021-09-04

## 2021-09-04 RX ORDER — POTASSIUM PHOSPHATE, MONOBASIC POTASSIUM PHOSPHATE, DIBASIC 236; 224 MG/ML; MG/ML
15 INJECTION, SOLUTION INTRAVENOUS ONCE
Refills: 0 | Status: COMPLETED | OUTPATIENT
Start: 2021-09-04 | End: 2021-09-04

## 2021-09-04 RX ADMIN — Medication 100 MILLIGRAM(S): at 02:22

## 2021-09-04 RX ADMIN — ENOXAPARIN SODIUM 40 MILLIGRAM(S): 100 INJECTION SUBCUTANEOUS at 19:18

## 2021-09-04 RX ADMIN — PANTOPRAZOLE SODIUM 40 MILLIGRAM(S): 20 TABLET, DELAYED RELEASE ORAL at 06:37

## 2021-09-04 RX ADMIN — POTASSIUM PHOSPHATE, MONOBASIC POTASSIUM PHOSPHATE, DIBASIC 62.5 MILLIMOLE(S): 236; 224 INJECTION, SOLUTION INTRAVENOUS at 15:32

## 2021-09-04 RX ADMIN — Medication 0.1 MILLIGRAM(S): at 21:57

## 2021-09-04 RX ADMIN — Medication 100 MILLIGRAM(S): at 19:42

## 2021-09-04 RX ADMIN — Medication 100 GRAM(S): at 13:50

## 2021-09-04 RX ADMIN — Medication 1 TABLET(S): at 13:50

## 2021-09-04 RX ADMIN — Medication 50 MILLIGRAM(S): at 06:39

## 2021-09-04 RX ADMIN — AMLODIPINE BESYLATE 10 MILLIGRAM(S): 2.5 TABLET ORAL at 06:39

## 2021-09-04 RX ADMIN — Medication 1 APPLICATION(S): at 18:57

## 2021-09-04 RX ADMIN — Medication 12.5 MILLIGRAM(S): at 19:42

## 2021-09-04 RX ADMIN — Medication 100 MILLIGRAM(S): at 11:21

## 2021-09-04 RX ADMIN — Medication 325 MILLIGRAM(S): at 13:50

## 2021-09-04 RX ADMIN — Medication 1 APPLICATION(S): at 06:38

## 2021-09-04 RX ADMIN — Medication 50 MILLIGRAM(S): at 19:18

## 2021-09-04 RX ADMIN — Medication 0.1 MILLIGRAM(S): at 11:09

## 2021-09-04 RX ADMIN — ATORVASTATIN CALCIUM 40 MILLIGRAM(S): 80 TABLET, FILM COATED ORAL at 21:57

## 2021-09-04 NOTE — PROGRESS NOTE ADULT - PROBLEM SELECTOR PLAN 4
Pt w/ hx of CAD (last cardiac cath 15yrs ago, pt denies hx of MI or stent). EKG NSR w/ RBBB, no ischemic changes. Trop 0.02. Denies CP, SOB, palpiations, n/v. Home med: aspirin 325mg qd, atorvastatin 40mg qd.  - c/w aspirin and lipitor qd.

## 2021-09-04 NOTE — PROGRESS NOTE ADULT - PROBLEM SELECTOR PLAN 6
Pt w/ hx of chronic renal insufficiency (unknown baseline Cr) and Hgb 11.7 on admission. No overt signs of bleeding.  -Hb improved 13.3 <- 11.7  -total iron wnl but on very low range, ferritin high and TIBC low. Likely anemia of chronic disease

## 2021-09-04 NOTE — PROGRESS NOTE ADULT - PROBLEM SELECTOR PLAN 7
Pt w/ hx of hypokalemia (takes potassium at home). Likely 2/2 HCTZ use.  -Potassium 3.4 today repleted  -Monitor BMP and replete as needed

## 2021-09-04 NOTE — PROGRESS NOTE ADULT - PROBLEM SELECTOR PLAN 12
F: tolerating PO, no IVF  E: replete K<4, Mg<2  N: DASH    VTE ppx: Lovenox 40mg q24h  GI ppx: protonix PO  C: Full Code  D: Likely d/c home tomorrow with PO antibiotic

## 2021-09-04 NOTE — PROGRESS NOTE ADULT - PROBLEM SELECTOR PLAN 1
Pt presents w/ left LE pain and worsened b/l LE swelling. Found to have LLE nonpurulent cellulitis. US LE doppler negative. Received vanc/zosyn x1 in ED.   - c/w cefazolin 2g IV q8h (9/3-) and transition to PO keflex on d/c  - BCx (9/3): No growth at 1 day  - Improvement of cellulitis today, decreased area of erythema, decreased swelling but . Both pt and partner notes improvement  - bacitracin ointment to small ulcer of left lateral ankle.

## 2021-09-04 NOTE — PROGRESS NOTE ADULT - ATTENDING COMMENTS
Patient was seen and examined on 9/4/2021 at 11 am. He has no acute complaints, feels that the LLE cellulitis has greatly improved. Denies SOB, CP. ROS is otherwise negative. Vitals, labwork and pertinent imaging reviewed - pt remains afebrile, without leukocytosis, Cr. appears at baseline. Physical exam - NAD, AAO x 4, PERRLA, EOMI, MMM, supple neck, chest - CTA b/l, CV - rrr, s1s2, no m/r/g, abd - soft, NTND, + BS, ext - wwp, LLE - edema + 2, erythema is receding, vasc - + 2 pulses radial, AAO x 4    Plan to d/c on Keflex - possibly later today vs tomorrow AM

## 2021-09-04 NOTE — PROGRESS NOTE ADULT - PROBLEM SELECTOR PLAN 2
Pt w/ hx of HTN and reports previous hospitalizations (20yrs ago) for uncontrolled BP. Home meds: metoprolol succinate 50mg BID, clonidine 0.1mg BID, norvasc 10mg qd, HCTZ 12.5mg qd.  - c/w toprol  - c/w clonidine  - c/w norvasc  - Improved cr 1.43 <- 1.56 (possible baseline 1.5 as per family)  - Restart HCTZ 12.5mg daily as Cr improving

## 2021-09-04 NOTE — DISCHARGE NOTE PROVIDER - NSDCMRMEDTOKEN_GEN_ALL_CORE_FT
aspirin 325 mg oral tablet: 1 tab(s) orally once a day  cloNIDine 0.1 mg oral tablet: 1 tab(s) orally 2 times a day  doxepin 10 mg oral capsule: 10 milligram(s) orally once a day (at bedtime)  Fish Oil 1000 mg oral capsule: 1 cap(s) orally once a day  hydroCHLOROthiazide 12.5 mg oral tablet: 1 tab(s) orally once a day  Lipitor 40 mg oral tablet: 1 tab(s) orally once a day  metoprolol succinate 50 mg oral tablet, extended release: 50  orally 2 times a day  Multiple Vitamins oral tablet: 1 tab(s) orally once a day  Norvasc 10 mg oral tablet: 1 tab(s) orally once a day  omeprazole 20 mg oral delayed release capsule: 1 cap(s) orally once a day   amLODIPine 10 mg oral tablet: 1 tab(s) orally every 24 hours  aspirin 325 mg oral tablet: 1 tab(s) orally once a day  cephalexin 500 mg oral tablet: 1 tab(s) orally every 6 hours   cloNIDine 0.1 mg oral tablet: 1 tab(s) orally 2 times a day  doxepin 10 mg oral capsule: 10 milligram(s) orally once a day (at bedtime)  Fish Oil 1000 mg oral capsule: 1 cap(s) orally once a day  hydroCHLOROthiazide 12.5 mg oral tablet: 1 tab(s) orally once a day  Lipitor 40 mg oral tablet: 1 tab(s) orally once a day  metoprolol succinate 50 mg oral tablet, extended release: 50  orally 2 times a day  Multiple Vitamins oral tablet: 1 tab(s) orally once a day  omeprazole 20 mg oral delayed release capsule: 1 cap(s) orally once a day

## 2021-09-04 NOTE — PROGRESS NOTE ADULT - PROBLEM SELECTOR PLAN 9
Pt w/ hx of GERD. Pt denies abd pain, sore throat, dysphagia. Home med: omeprazole 20mg qd.  -c/w protonix PO

## 2021-09-04 NOTE — DISCHARGE NOTE PROVIDER - NS AS DC PROVIDER CONTACT Y/N MULTI
Refill requested for Albuterol HFA    Last office visit: 10/1/2019     Previously advised to follow up in no timeline listed in last office visit     F/U currently scheduled? No    ACTION: Routed to Dr. Domo oGyal for review. Yes

## 2021-09-04 NOTE — DISCHARGE NOTE PROVIDER - NSDCFUADDAPPT_GEN_ALL_CORE_FT
Please call [phone] to schedule a follow-up appointment with your Primary Care Physician, Dr. Stone Allen, within 1 week after you leave the hospital.

## 2021-09-04 NOTE — DISCHARGE NOTE PROVIDER - HOSPITAL COURSE
#Discharge: do not delete    Patient is __ yo M/F with past medical history of _____, presented with _____, found to have _____    Inpatient treatment course:     Problem List/Main Diagnoses:     New medications/therapies:   New lines/hardware:  Labs to be followed outpatient:   Exam to be followed outpatient:     Discharge plan: discharge to ______     #Discharge: do not delete    Patient is an 81yo male w/ PMH of HTN (on metoprolol, clonidine, HCTZ, amlodipine), CHF (unknown baseline EF), CAD (pt denies hx of MI, last cardiac cath 15 yrs ago, on aspirin 325 mg), HLD, GERD, depression and anxiety (on Doxepin), hypokalemia (on K 20mg), constipation (takes a stool softener at home), and chronic renal insufficiency (unknown baseline Cr), sleep apnea (received a sleep device 1wk ago, not yet acclimated with it) who presented for swelling of b/l lower extremities and pain of the left lower extremity for 1 week. The patient reports that he has b/l arthritic knee pain but that the pain in his left knee seemed to spread downwards starting 1 week ago, is intermittent and at its worst a 4/10; he put biofreeze (which he uses for arthritic pain) all over the b/l lower extremities because of the pain. He also reports that he has had swelling of this legs for many years but that it got worse on Monday. On floors, both patient and partner noticed significant improvement of cellulitis warmth, swelling, and  movement of cellulitis more distal from proximal tracing of cellulitis borders. LE venous doppler negative. Blood culture ngtd 12 hours. Continue with IV cefazolin 2g q8h and transtition to PO medication upon discharge.    Inpatient treatment course: vanc x1, zosyn x1, cefazolin    Problem List/Main Diagnoses:   #Cellulitis of left leg  Pt presents w/ left LE pain and worsened b/l LE swelling. Found to have LLE nonpurulent cellulitis. US LE doppler negative. Received vanc/zosyn x1 in ED and received IV cefazolin 2g q8 on floors with significant improvement of cellulitis, warmth, and swelling.   -Bacitracin ointment to small ulcer of left lateral ankle  -Transition to PO antibiotics  upon discharge    #Hypertension  Pt w/ hx of HTN and reports previous hospitalizations (20yrs ago) for uncontrolled BP. Home meds: metoprolol succinate 50mg BID, clonidine 0.1mg BID, norvasc 10mg qd, HCTZ 12.5mg qd.  -Restart anti-HTN medications    #Chronic CHF  Pt w/ hx of CHF (unknown baseline EF). CXR w/ b/l pulm edema. Few b/l rales, LE edema L>R, . O2 sat 96 RA,  -Refer patient to outpatient cardiologist as he does not currently see one    #CAD  Pt w/ hx of CAD (last cardiac cath 15yrs ago, pt denies hx of MI or stent). EKG NSR w/ RBBB, no ischemic changes. Trop 0.02.Home med: aspirin 325mg qd, atorvastatin 40mg qd.  -c/w aspirin and lipitor q24h    #Chronic renal insufficiency   Pt w/ hx of chronic renal insufficiency (unknown baseline Cr, family thinks it may be ~1.5) and Cr 1.56 on admission. UA negative. b/l LE edema L>R. no JVD or hepatojuglar reflux. Likely chronic but further evaluation for possible acute change.    #Anemia  Pt w/ hx of chronic renal insufficiency (unknown baseline Cr) and Hgb 11.7 on admission. No overt signs of bleeding. Likely AOCD.  -Iron studies do point in direction of AOCD    #Hypokalemia  Pt w/ hx of hypokalemia (takes potassium at home). Likely 2/2 HCTZ use  -Restarted HCTZ combined with potassium repletion    #Depression  Pt w/ hx of depression and anxiety? (pt denies psychiatric history). Home med: doxepin 10mg qhs.  -Patient notes doxepin daily usage at home.    #GERD  Pt w/ hx of GERD. Pt denies abd pain, sore throat, dysphagia. Home med: omeprazole 20mg qd.  - c/w protonix PO.    #Arthritis  Pt w/ hx of arthritis and associated pain. Uses biofreeze topical at home on b/l knees. Also w/ b/l shoulder arthritic pain.  - tylenol PO q6 prn for pain.    #Sleep apnea  Pt reports that last Friday he received a CPAP device because he has difficulty sleeping. He does not use the device every day because he is not comfortable using it while he sleeps.  - patient unsure of CPAP settings  - instruct pt to follow up with his sleep specialist.      New medications/therapies:: IV cefazolin, PO antibiotic  New lines/hardware: None  Labs to be followed outpatient:   Exam to be followed outpatient:     Discharge plan: discharge to ______     #Discharge: do not delete    Patient is an 81yo male w/ PMH of HTN (on metoprolol, clonidine, HCTZ, amlodipine), CHF (unknown baseline EF), CAD (pt denies hx of MI, last cardiac cath 15 yrs ago, on aspirin 325 mg), HLD, GERD, depression and anxiety (on Doxepin), hypokalemia (on K 20mg), constipation (takes a stool softener at home), and chronic renal insufficiency (unknown baseline Cr), sleep apnea (received a sleep device 1wk ago, not yet acclimated with it) who presented for swelling of b/l lower extremities and pain of the left lower extremity for 1 week. The patient reports that he has b/l arthritic knee pain but that the pain in his left knee seemed to spread downwards starting 1 week ago, is intermittent and at its worst a 4/10; he put biofreeze (which he uses for arthritic pain) all over the b/l lower extremities because of the pain. He also reports that he has had swelling of this legs for many years but that it got worse on Monday. On floors, both patient and partner noticed significant improvement of cellulitis warmth, swelling, and  movement of cellulitis more distal from proximal tracing of cellulitis borders. LE venous doppler negative. Blood culture ngtd 12 hours. Continue with IV cefazolin 2g q8h and transtition to PO medication upon discharge.    Problem List/Main Diagnoses:   #Cellulitis of left leg  Pt presents w/ left LE pain and worsened b/l LE swelling. Found to have LLE nonpurulent cellulitis. US LE doppler negative. Received vanc/zosyn x1 in ED and received IV cefazolin 2g q8 on floors with significant improvement of cellulitis, warmth, and swelling.   -Bacitracin ointment to small ulcer of left lateral ankle  -Transition to PO Keflex 500mg Q6hrs x 5 days on discharge (9/6-9/10)    #Hypertension  Pt w/ hx of HTN and reports previous hospitalizations (20yrs ago) for uncontrolled BP. Home meds: metoprolol succinate 50mg BID, clonidine 0.1mg BID, norvasc 10mg qd, HCTZ 12.5mg qd.  - c/w home amlodipine, clonidine, HCTZ, toprol.    #Chronic CHF  Pt w/ hx of CHF (unknown baseline EF). CXR w/ b/l pulm edema. Few b/l rales, LE edema L>R, . O2 sat 96 RA,  - c/w home toprol  - pt to follow-up w/ outpt PCP    #CAD  Pt w/ hx of CAD (last cardiac cath 15yrs ago, pt denies hx of MI or stent). EKG NSR w/ RBBB, no ischemic changes. Trop 0.02.Home med: aspirin 325mg qd, atorvastatin 40mg qd.  - c/w aspirin 325mg QD  - c/w atorvastatin 40mg QD  - c/w home toprol 50mg BID    #Chronic renal insufficiency   Pt w/ hx of chronic renal insufficiency (unknown baseline Cr, family thinks it may be ~1.5) and Cr 1.56 on admission. UA negative. b/l LE edema L>R. no JVD or hepatojuglar reflux. Likely chronic but further evaluation for possible acute change. Cr since improved from 1.56 to 1.4 at time of discharge, consistent w/ reported home baseline.  - pt to f/u w/ outpt PCP    #Anemia of chronic disease  Pt w/ hx of chronic renal insufficiency (unknown baseline Cr) and Hgb 11.7 on admission. No overt signs of bleeding. Likely AOCD, ferritin 520.  - pt to f/u w/ outpt PCP    #Hypokalemia  Pt w/ hx of hypokalemia (takes potassium at home). Likely 2/2 HCTZ use. Restarted HCTZ combined with potassium repletion  - c/w home HCTZ w/ home potassium repletion    #Depression  Pt w/ hx of depression and anxiety? (pt denies psychiatric history). Home med: doxepin 10mg qhs.  - c/w home doxepin daily usage at home.    #GERD  Pt w/ hx of GERD. Pt denies abd pain, sore throat, dysphagia. Home med: omeprazole 20mg qd.  - c/w protonix PO 20mg QD.    #Osteoarthritis  Pt w/ hx of osteoarthritis and associated pain. Uses biofreeze topical at home on b/l knees. Also w/ b/l shoulder arthritic pain.  - tylenol PO q6 prn for pain.    #Sleep apnea  Pt reports that last Friday he received a CPAP device because he has difficulty sleeping. He does not use the device every day because he is not comfortable using it while he sleeps.  - patient unsure of CPAP settings  - instruct pt to follow up with his sleep specialist.    New medications/therapies:: Keflex 500mg Q6hrs x 5 days  New lines/hardware: None  Labs to be followed outpatient: none  Exam to be followed outpatient: none    Discharge plan: discharge to home

## 2021-09-04 NOTE — PROGRESS NOTE ADULT - ASSESSMENT
Patient is an 79yo male w/ PMH of HTN (on metoprolol, clonidine, HCTZ, amlodipine), CHF (unknown baseline EF), CAD (pt denies hx of MI, last cardiac cath 15 yrs ago, on aspirin 325 mg), HLD, GERD, depression and anxiety (on Doxepin), hypokalemia (on K 20mg), constipation (takes a stool softener at home), and chronic renal insufficiency (unknown baseline Cr) who presented for swelling of b/l lower extremities and pain of the left lower extremity for 1 week. Admitted for LLE nonpurulent cellulitis. Patient is an 81yo male w/ PMH of HTN (on metoprolol, clonidine, HCTZ, amlodipine), CHF (unknown baseline EF), CAD (pt denies hx of MI, last cardiac cath 15 yrs ago, on aspirin 325 mg), HLD, GERD, depression and anxiety (on Doxepin), hypokalemia (on K 20mg), constipation (takes a stool softener at home), and chronic renal insufficiency (unknown baseline Cr) who presented for swelling of b/l lower extremities and pain of the left lower extremity for 1 week. Admitted for LLE nonpurulent cellulitis and now improved on antibiotics.

## 2021-09-04 NOTE — PROGRESS NOTE ADULT - PROBLEM SELECTOR PLAN 11
Pt reports that last Friday he received a CPAP device because he has difficulty sleeping. He does not use the device every day because he is not comfortable using it while he sleeps.  - patient unsure of CPAP settings  - instruct pt to follow up with his sleep specialist.

## 2021-09-04 NOTE — PROGRESS NOTE ADULT - PROBLEM SELECTOR PLAN 5
Pt w/ hx of chronic renal insufficiency (unknown baseline Cr, family thinks it may be ~1.5) and Cr 1.56 on admission. UA negative. b/l LE edema L>R. no JVD or hepatojuglar reflux. Likely chronic but further evaluation for possible acute change.  -Cr improved 1.43 <- 1.56  -trend Cr

## 2021-09-04 NOTE — PROGRESS NOTE ADULT - SUBJECTIVE AND OBJECTIVE BOX
OVERNIGHT EVENTS: No overnight event.    SUBJECTIVE / INTERVAL HPI: Patient seen and examined at bedside. Pt's partner is also at bedside and both pt and partner notes improvement in LE swelling and erythema and are happy with the treatment. Patient notes still having tenderness on palpation of the left lower extremity. Denies headache, dizziness, fever, chills, CP, SOB, abd pain, or any new weakness.    VITAL SIGNS:  Vital Signs Last 24 Hrs  T(C): 36.9 (04 Sep 2021 06:17), Max: 37 (03 Sep 2021 19:18)  T(F): 98.5 (04 Sep 2021 06:17), Max: 98.6 (03 Sep 2021 19:18)  HR: 84 (04 Sep 2021 11:07) (65 - 84)  BP: 150/70 (04 Sep 2021 11:07) (147/72 - 159/65)  BP(mean): 97 (03 Sep 2021 14:54) (97 - 97)  RR: 17 (04 Sep 2021 11:07) (15 - 18)  SpO2: 93% (04 Sep 2021 11:07) (93% - 96%)    PHYSICAL EXAM:    General: Well developed, well nourished, no acute distress  HEENT: NC/AT; PERRL, anicteric sclera; MMM, neck supple  Cardiovascular: +S1/S2, RRR, no murmurs, rubs, gallops  Respiratory: CTA B/L; no W/R/R  Gastrointestinal: soft, NT/ND; +BSx4  Extremities: WWP; LLE swelling and erythema today slightly proximal to mid calf and distal from the marked area below knee, warm to touch and tender to palpation. No drainage, purulence of fluctuance. Ulcer on lateral left ankle bandaged with no discharge.  Vascular: 2+ radial, DP/PT pulses B/L  Neurological: AAOx3; no focal deficits    MEDICATIONS:  MEDICATIONS  (STANDING):  amLODIPine   Tablet 10 milliGRAM(s) Oral every 24 hours  aspirin 325 milliGRAM(s) Oral daily  atorvastatin 40 milliGRAM(s) Oral at bedtime  BACItracin   Ointment 1 Application(s) Topical every 12 hours  ceFAZolin   IVPB 2000 milliGRAM(s) IV Intermittent every 8 hours  cloNIDine 0.1 milliGRAM(s) Oral every 12 hours  enoxaparin Injectable 40 milliGRAM(s) SubCutaneous every 24 hours  influenza  Vaccine (HIGH DOSE) 0.7 milliLiter(s) IntraMuscular once  metoprolol succinate ER 50 milliGRAM(s) Oral every 12 hours  multivitamin  Chewable 1 Tablet(s) Oral daily  pantoprazole    Tablet 40 milliGRAM(s) Oral before breakfast  polyethylene glycol 3350 17 Gram(s) Oral every 12 hours  potassium phosphate IVPB 15 milliMole(s) IV Intermittent once  senna 1 Tablet(s) Oral at bedtime    MEDICATIONS  (PRN):  acetaminophen   Tablet .. 650 milliGRAM(s) Oral every 6 hours PRN Temp greater or equal to 38.5C (101.3F), Mild Pain (1 - 3)      ALLERGIES:  Allergies    No Known Allergies    Intolerances        LABS:                        13.3   9.68  )-----------( 274      ( 04 Sep 2021 11:16 )             40.6     09-04    140  |  100  |  11  ----------------------------<  94  3.4<L>   |  28  |  1.43<H>    Ca    9.2      04 Sep 2021 11:16  Phos  2.2     09-04  Mg     1.4     09-04    TPro  8.3  /  Alb  3.3  /  TBili  0.6  /  DBili  x   /  AST  29  /  ALT  22  /  AlkPhos  70  09-04    PT/INR - ( 04 Sep 2021 11:16 )   PT: 14.8 sec;   INR: 1.25          PTT - ( 04 Sep 2021 11:16 )  PTT:37.0 sec  Urinalysis Basic - ( 03 Sep 2021 12:06 )    Color: Yellow / Appearance: Clear / S.020 / pH: x  Gluc: x / Ketone: NEGATIVE  / Bili: Negative / Urobili: 1.0 E.U./dL   Blood: x / Protein: 30 mg/dL / Nitrite: NEGATIVE   Leuk Esterase: NEGATIVE / RBC: < 5 /HPF / WBC < 5 /HPF   Sq Epi: x / Non Sq Epi: 0-5 /HPF / Bacteria: None /HPF      CAPILLARY BLOOD GLUCOSE          RADIOLOGY & ADDITIONAL TESTS: Reviewed.

## 2021-09-04 NOTE — PROGRESS NOTE ADULT - PROBLEM SELECTOR PLAN 8
Pt w/ hx of depression and anxiety? (pt denies psychiatric history). Home med: doxepin 10mg qhs.  - confirm doxepin usage at home.

## 2021-09-04 NOTE — DISCHARGE NOTE PROVIDER - CARE PROVIDER_API CALL
SUSY GUO  Internal Medicine  [address] [address]  Phone: [phone]  Fax: [phone]  Established Patient  Follow Up Time: 1 week

## 2021-09-04 NOTE — DISCHARGE NOTE PROVIDER - NSDCCPCAREPLAN_GEN_ALL_CORE_FT
PRINCIPAL DISCHARGE DIAGNOSIS  Diagnosis: Cellulitis  Assessment and Plan of Treatment: Take the prescribed antibiotic medicine you are given as directed until it is gone. Take it even if you feel better. It treats the infection and stops it from  Not taking all the medicine can make future infections hard to treat. Keep the infected area clean. When possible, raise the infected area above the level of your heart. This helps keep swelling down. Apply clean bandages as advised. Wash your hands often to prevent spreading the infection. In the future, wash your hands before and after you touch cuts, scratches, or bandages. This will help prevent infection.   Call your doctor or return to the emergency room or call 911 immediately if you have any of the following: Difficulty or pain when moving the joints above or below the infected area, Discharge or pus draining from the area, rever of 100.4°F (38°C) or higher, or as directed by your healthcare provider, pain that gets worse in or around the infected site, redness that gets worse in or around the infected area, particularly if the area of redness expands to a wider area, shaking chills, swelling of the infected area, vomiting.         PRINCIPAL DISCHARGE DIAGNOSIS  Diagnosis: Cellulitis  Assessment and Plan of Treatment: Take the prescribed antibiotic medicine you are given as directed until it is completed. Please take your oral Kelfex 500mg tablet every 6 hours starting on September 6th, and ending after September 10th. Take the antibiotic even if you feel better before you finish all the tablet. It treats the infection and stops it from  Not taking all the medicine can make future infections hard to treat. Keep the infected area clean. When possible, raise the infected area above the level of your heart. This helps keep swelling down. Apply clean bandages as advised. Wash your hands often to prevent spreading the infection. In the future, wash your hands before and after you touch cuts, scratches, or bandages. This will help prevent infection.   Call your doctor or return to the emergency room or call 911 immediately if you have any of the following: Difficulty or pain when moving the joints above or below the infected area, Discharge or pus draining from the area, rever of 100.4°F (38°C) or higher, or as directed by your healthcare provider, pain that gets worse in or around the infected site, redness that gets worse in or around the infected area, particularly if the area of redness expands to a wider area, shaking chills, swelling of the infected area, vomiting.

## 2021-09-04 NOTE — PROGRESS NOTE ADULT - PROBLEM SELECTOR PLAN 3
Pt w/ hx of CHF (unknown baseline EF). CXR w/ b/l pulm edema  -Lungs clear today  -Restart HCTZ 12.5mg q24h today  -can refer patient to outpatient cardiologist as he does not currently see one.

## 2021-09-05 VITALS
DIASTOLIC BLOOD PRESSURE: 74 MMHG | RESPIRATION RATE: 17 BRPM | OXYGEN SATURATION: 98 % | TEMPERATURE: 98 F | HEART RATE: 74 BPM | SYSTOLIC BLOOD PRESSURE: 158 MMHG

## 2021-09-05 LAB
ALBUMIN SERPL ELPH-MCNC: 3 G/DL — LOW (ref 3.3–5)
ALP SERPL-CCNC: 57 U/L — SIGNIFICANT CHANGE UP (ref 40–120)
ALT FLD-CCNC: 16 U/L — SIGNIFICANT CHANGE UP (ref 10–45)
ANION GAP SERPL CALC-SCNC: 11 MMOL/L — SIGNIFICANT CHANGE UP (ref 5–17)
AST SERPL-CCNC: 27 U/L — SIGNIFICANT CHANGE UP (ref 10–40)
BASOPHILS # BLD AUTO: 0.03 K/UL — SIGNIFICANT CHANGE UP (ref 0–0.2)
BASOPHILS NFR BLD AUTO: 0.4 % — SIGNIFICANT CHANGE UP (ref 0–2)
BILIRUB SERPL-MCNC: 0.4 MG/DL — SIGNIFICANT CHANGE UP (ref 0.2–1.2)
BUN SERPL-MCNC: 11 MG/DL — SIGNIFICANT CHANGE UP (ref 7–23)
CALCIUM SERPL-MCNC: 8.6 MG/DL — SIGNIFICANT CHANGE UP (ref 8.4–10.5)
CHLORIDE SERPL-SCNC: 100 MMOL/L — SIGNIFICANT CHANGE UP (ref 96–108)
CO2 SERPL-SCNC: 28 MMOL/L — SIGNIFICANT CHANGE UP (ref 22–31)
COVID-19 SPIKE DOMAIN AB INTERP: POSITIVE
COVID-19 SPIKE DOMAIN ANTIBODY RESULT: >250 U/ML — HIGH
CREAT SERPL-MCNC: 1.4 MG/DL — HIGH (ref 0.5–1.3)
EOSINOPHIL # BLD AUTO: 0.08 K/UL — SIGNIFICANT CHANGE UP (ref 0–0.5)
EOSINOPHIL NFR BLD AUTO: 1 % — SIGNIFICANT CHANGE UP (ref 0–6)
GLUCOSE SERPL-MCNC: 96 MG/DL — SIGNIFICANT CHANGE UP (ref 70–99)
HCT VFR BLD CALC: 35.7 % — LOW (ref 39–50)
HGB BLD-MCNC: 11.7 G/DL — LOW (ref 13–17)
IMM GRANULOCYTES NFR BLD AUTO: 0.4 % — SIGNIFICANT CHANGE UP (ref 0–1.5)
LYMPHOCYTES # BLD AUTO: 1.37 K/UL — SIGNIFICANT CHANGE UP (ref 1–3.3)
LYMPHOCYTES # BLD AUTO: 16.5 % — SIGNIFICANT CHANGE UP (ref 13–44)
MAGNESIUM SERPL-MCNC: 1.6 MG/DL — SIGNIFICANT CHANGE UP (ref 1.6–2.6)
MCHC RBC-ENTMCNC: 28.4 PG — SIGNIFICANT CHANGE UP (ref 27–34)
MCHC RBC-ENTMCNC: 32.8 GM/DL — SIGNIFICANT CHANGE UP (ref 32–36)
MCV RBC AUTO: 86.7 FL — SIGNIFICANT CHANGE UP (ref 80–100)
MONOCYTES # BLD AUTO: 0.74 K/UL — SIGNIFICANT CHANGE UP (ref 0–0.9)
MONOCYTES NFR BLD AUTO: 8.9 % — SIGNIFICANT CHANGE UP (ref 2–14)
NEUTROPHILS # BLD AUTO: 6.03 K/UL — SIGNIFICANT CHANGE UP (ref 1.8–7.4)
NEUTROPHILS NFR BLD AUTO: 72.8 % — SIGNIFICANT CHANGE UP (ref 43–77)
NRBC # BLD: 0 /100 WBCS — SIGNIFICANT CHANGE UP (ref 0–0)
PHOSPHATE SERPL-MCNC: 2.7 MG/DL — SIGNIFICANT CHANGE UP (ref 2.5–4.5)
PLATELET # BLD AUTO: 287 K/UL — SIGNIFICANT CHANGE UP (ref 150–400)
POTASSIUM SERPL-MCNC: 3.4 MMOL/L — LOW (ref 3.5–5.3)
POTASSIUM SERPL-SCNC: 3.4 MMOL/L — LOW (ref 3.5–5.3)
PROT SERPL-MCNC: 7.1 G/DL — SIGNIFICANT CHANGE UP (ref 6–8.3)
RBC # BLD: 4.12 M/UL — LOW (ref 4.2–5.8)
RBC # FLD: 13.7 % — SIGNIFICANT CHANGE UP (ref 10.3–14.5)
SARS-COV-2 IGG+IGM SERPL QL IA: >250 U/ML — HIGH
SARS-COV-2 IGG+IGM SERPL QL IA: POSITIVE
SODIUM SERPL-SCNC: 139 MMOL/L — SIGNIFICANT CHANGE UP (ref 135–145)
WBC # BLD: 8.28 K/UL — SIGNIFICANT CHANGE UP (ref 3.8–10.5)
WBC # FLD AUTO: 8.28 K/UL — SIGNIFICANT CHANGE UP (ref 3.8–10.5)

## 2021-09-05 PROCEDURE — 87086 URINE CULTURE/COLONY COUNT: CPT

## 2021-09-05 PROCEDURE — 99285 EMERGENCY DEPT VISIT HI MDM: CPT

## 2021-09-05 PROCEDURE — 71045 X-RAY EXAM CHEST 1 VIEW: CPT

## 2021-09-05 PROCEDURE — 85610 PROTHROMBIN TIME: CPT

## 2021-09-05 PROCEDURE — 85730 THROMBOPLASTIN TIME PARTIAL: CPT

## 2021-09-05 PROCEDURE — 99239 HOSP IP/OBS DSCHRG MGMT >30: CPT

## 2021-09-05 PROCEDURE — 83605 ASSAY OF LACTIC ACID: CPT

## 2021-09-05 PROCEDURE — 84100 ASSAY OF PHOSPHORUS: CPT

## 2021-09-05 PROCEDURE — 87040 BLOOD CULTURE FOR BACTERIA: CPT

## 2021-09-05 PROCEDURE — 83540 ASSAY OF IRON: CPT

## 2021-09-05 PROCEDURE — 83550 IRON BINDING TEST: CPT

## 2021-09-05 PROCEDURE — 83036 HEMOGLOBIN GLYCOSYLATED A1C: CPT

## 2021-09-05 PROCEDURE — 82728 ASSAY OF FERRITIN: CPT

## 2021-09-05 PROCEDURE — 36415 COLL VENOUS BLD VENIPUNCTURE: CPT

## 2021-09-05 PROCEDURE — 85025 COMPLETE CBC W/AUTO DIFF WBC: CPT

## 2021-09-05 PROCEDURE — 93970 EXTREMITY STUDY: CPT

## 2021-09-05 PROCEDURE — 80053 COMPREHEN METABOLIC PANEL: CPT

## 2021-09-05 PROCEDURE — 87635 SARS-COV-2 COVID-19 AMP PRB: CPT

## 2021-09-05 PROCEDURE — 81001 URINALYSIS AUTO W/SCOPE: CPT

## 2021-09-05 PROCEDURE — 86769 SARS-COV-2 COVID-19 ANTIBODY: CPT

## 2021-09-05 PROCEDURE — 84484 ASSAY OF TROPONIN QUANT: CPT

## 2021-09-05 PROCEDURE — 83880 ASSAY OF NATRIURETIC PEPTIDE: CPT

## 2021-09-05 PROCEDURE — 83735 ASSAY OF MAGNESIUM: CPT

## 2021-09-05 PROCEDURE — 93005 ELECTROCARDIOGRAM TRACING: CPT

## 2021-09-05 RX ORDER — POTASSIUM CHLORIDE 20 MEQ
40 PACKET (EA) ORAL EVERY 4 HOURS
Refills: 0 | Status: COMPLETED | OUTPATIENT
Start: 2021-09-05 | End: 2021-09-05

## 2021-09-05 RX ORDER — MAGNESIUM SULFATE 500 MG/ML
1 VIAL (ML) INJECTION ONCE
Refills: 0 | Status: COMPLETED | OUTPATIENT
Start: 2021-09-05 | End: 2021-09-05

## 2021-09-05 RX ORDER — AMLODIPINE BESYLATE 2.5 MG/1
1 TABLET ORAL
Qty: 0 | Refills: 0 | DISCHARGE

## 2021-09-05 RX ORDER — AMLODIPINE BESYLATE 2.5 MG/1
1 TABLET ORAL
Qty: 0 | Refills: 0 | DISCHARGE
Start: 2021-09-05

## 2021-09-05 RX ADMIN — Medication 0.1 MILLIGRAM(S): at 10:21

## 2021-09-05 RX ADMIN — AMLODIPINE BESYLATE 10 MILLIGRAM(S): 2.5 TABLET ORAL at 07:11

## 2021-09-05 RX ADMIN — Medication 1 TABLET(S): at 12:13

## 2021-09-05 RX ADMIN — Medication 100 GRAM(S): at 11:38

## 2021-09-05 RX ADMIN — Medication 50 MILLIGRAM(S): at 07:11

## 2021-09-05 RX ADMIN — Medication 1 APPLICATION(S): at 07:11

## 2021-09-05 RX ADMIN — Medication 40 MILLIEQUIVALENT(S): at 14:39

## 2021-09-05 RX ADMIN — Medication 100 MILLIGRAM(S): at 10:22

## 2021-09-05 RX ADMIN — Medication 325 MILLIGRAM(S): at 12:14

## 2021-09-05 RX ADMIN — Medication 100 MILLIGRAM(S): at 01:01

## 2021-09-05 RX ADMIN — PANTOPRAZOLE SODIUM 40 MILLIGRAM(S): 20 TABLET, DELAYED RELEASE ORAL at 07:11

## 2021-09-05 RX ADMIN — Medication 40 MILLIEQUIVALENT(S): at 10:22

## 2021-09-05 NOTE — DISCHARGE NOTE NURSING/CASE MANAGEMENT/SOCIAL WORK - PATIENT PORTAL LINK FT
You can access the FollowMyHealth Patient Portal offered by [facility] by registering at the following website: [email or web address]. By joining [facility]’s FollowMyHealth portal, you will also be able to view your health information using other applications (apps) compatible with our system.

## 2021-09-05 NOTE — DISCHARGE NOTE NURSING/CASE MANAGEMENT/SOCIAL WORK - NSDCFUADDAPPT_GEN_ALL_CORE_FT
Please call [phone] to schedule a follow-up appointment with your Primary Care Physician, Dr. Stone Allen, within 1 week after you leave the hospital.

## 2021-09-06 RX ORDER — CEPHALEXIN 500 MG
1 CAPSULE ORAL
Qty: 20 | Refills: 0
Start: 2021-09-06 | End: 2021-09-10

## 2021-09-08 LAB
CULTURE RESULTS: SIGNIFICANT CHANGE UP
CULTURE RESULTS: SIGNIFICANT CHANGE UP
SPECIMEN SOURCE: SIGNIFICANT CHANGE UP
SPECIMEN SOURCE: SIGNIFICANT CHANGE UP

## 2021-09-09 DIAGNOSIS — T50.2X5A ADVERSE EFFECT OF CARBONIC-ANHYDRASE INHIBITORS, BENZOTHIADIAZIDES AND OTHER DIURETICS, INITIAL ENCOUNTER: ICD-10-CM

## 2021-09-09 DIAGNOSIS — Z79.82 LONG TERM (CURRENT) USE OF ASPIRIN: ICD-10-CM

## 2021-09-09 DIAGNOSIS — I13.0 HYPERTENSIVE HEART AND CHRONIC KIDNEY DISEASE WITH HEART FAILURE AND STAGE 1 THROUGH STAGE 4 CHRONIC KIDNEY DISEASE, OR UNSPECIFIED CHRONIC KIDNEY DISEASE: ICD-10-CM

## 2021-09-09 DIAGNOSIS — L03.116 CELLULITIS OF LEFT LOWER LIMB: ICD-10-CM

## 2021-09-09 DIAGNOSIS — N18.9 CHRONIC KIDNEY DISEASE, UNSPECIFIED: ICD-10-CM

## 2021-09-09 DIAGNOSIS — Z87.891 PERSONAL HISTORY OF NICOTINE DEPENDENCE: ICD-10-CM

## 2021-09-09 DIAGNOSIS — M19.012 PRIMARY OSTEOARTHRITIS, LEFT SHOULDER: ICD-10-CM

## 2021-09-09 DIAGNOSIS — D63.1 ANEMIA IN CHRONIC KIDNEY DISEASE: ICD-10-CM

## 2021-09-09 DIAGNOSIS — F32.9 MAJOR DEPRESSIVE DISORDER, SINGLE EPISODE, UNSPECIFIED: ICD-10-CM

## 2021-09-09 DIAGNOSIS — L97.329 NON-PRESSURE CHRONIC ULCER OF LEFT ANKLE WITH UNSPECIFIED SEVERITY: ICD-10-CM

## 2021-09-09 DIAGNOSIS — M19.011 PRIMARY OSTEOARTHRITIS, RIGHT SHOULDER: ICD-10-CM

## 2021-09-09 DIAGNOSIS — I25.10 ATHEROSCLEROTIC HEART DISEASE OF NATIVE CORONARY ARTERY WITHOUT ANGINA PECTORIS: ICD-10-CM

## 2021-09-09 DIAGNOSIS — Z99.81 DEPENDENCE ON SUPPLEMENTAL OXYGEN: ICD-10-CM

## 2021-09-09 DIAGNOSIS — E78.5 HYPERLIPIDEMIA, UNSPECIFIED: ICD-10-CM

## 2021-09-09 DIAGNOSIS — I45.10 UNSPECIFIED RIGHT BUNDLE-BRANCH BLOCK: ICD-10-CM

## 2021-09-09 DIAGNOSIS — I50.22 CHRONIC SYSTOLIC (CONGESTIVE) HEART FAILURE: ICD-10-CM

## 2021-09-09 DIAGNOSIS — E87.6 HYPOKALEMIA: ICD-10-CM

## 2021-09-09 DIAGNOSIS — N40.0 BENIGN PROSTATIC HYPERPLASIA WITHOUT LOWER URINARY TRACT SYMPTOMS: ICD-10-CM

## 2021-09-09 DIAGNOSIS — M62.421 CONTRACTURE OF MUSCLE, RIGHT UPPER ARM: ICD-10-CM

## 2021-09-09 DIAGNOSIS — M17.0 BILATERAL PRIMARY OSTEOARTHRITIS OF KNEE: ICD-10-CM

## 2021-09-09 DIAGNOSIS — K21.9 GASTRO-ESOPHAGEAL REFLUX DISEASE WITHOUT ESOPHAGITIS: ICD-10-CM

## 2021-09-09 DIAGNOSIS — G47.30 SLEEP APNEA, UNSPECIFIED: ICD-10-CM

## 2021-09-09 DIAGNOSIS — K59.00 CONSTIPATION, UNSPECIFIED: ICD-10-CM

## 2021-09-09 DIAGNOSIS — F41.9 ANXIETY DISORDER, UNSPECIFIED: ICD-10-CM

## 2021-11-24 NOTE — ED ADULT NURSE NOTE - NS ED NURSE LEVEL OF CONSCIOUSNESS AFFECT
Sent message to Patricia we never received form. She was not at the same office as the paperwork She will Fax when she is at that office   Calm

## 2021-11-25 NOTE — PROGRESS NOTE ADULT - PROBLEM SELECTOR PLAN 10
1. Keep incision clean and dry. Skin glue will peel off on its own.   2. Ok to shower. No baths or swimming for 6 weeks.  3. No heavy lifting more than 10 pounds for 6 weeks.  4. Take tylenol as needed for pain every 6 hours. Take norco only for breakthrough pain as needed every 6 hours.  5. Take colace while taking norco, as narcotics can cause constipation.  6. Don't take more than 4000 mg of tylenol in 24 hours.  7. Do not drive while taking narcotics.  8. Follow up as instructed.      Pt w/ hx of arthritis and associated pain. Uses biofreeze topical at home on b/l knees. Also w/ b/l shoulder arthritic pain.  - tylenol PO q6 prn for pain.

## 2022-05-05 NOTE — ED PROVIDER NOTE - CADM POA CENTRAL LINE
Call placed to patient with Dr. Borden's orders for scheduling an appointment.  Unable to reach at this time voicemail disabled.    Refused Prescriptions     dextroamphetamine-amphetamine (ADDERALL XR) 20 MG 24 hr capsule         Sig: Take 1 capsule (20 mg total) by mouth every morning.    Disp:  30 capsule    Refills:  0    Start: 4/26/2022    Earliest Fill Date: 4/26/2022    Class: Normal    Refused by: Edis Borden MD    Refusal reason: Patient needs an appointment          
No

## 2024-01-02 NOTE — ED PROVIDER NOTE - CPE EDP EYES NORM
Procedure  Orthopaedic Injury Treatment - Upper Extremity    Performed by: Adonis Blankenship MD  Authorized by: Adonis Blankenship MD    Consent:     Consent obtained:  Verbal and written    Consent given by:  Patient    Risks, benefits, and alternatives were discussed: yes    Universal protocol:     Procedure explained and questions answered to patient or proxy's satisfaction: yes      Relevant documents present and verified: yes      Test results available: yes      Imaging studies available: yes      Required blood products, implants, devices, and special equipment available: yes      Immediately prior to procedure, a time out was called: yes      Patient identity confirmed:  Verbally with patient, hospital-assigned identification number and arm band  Location:     Location:  Shoulder    Shoulder location:  L shoulder    Shoulder dislocation type: anterior    Pre-procedure details:     Pre-procedure imaging:  X-ray    Imaging findings: dislocation present      Imaging findings: no fracture      Distal perfusion: normal    Sedation:     Sedation type:  Deep  Procedure details:     Manipulation performed: yes      Shoulder reduction method:  Traction and counter traction, external rotation and direct traction    Reduction successful: yes      Reduction confirmed with imaging: yes      Immobilization:  Sling  Post-procedure details:     Neurological function: normal      Distal perfusion: normal      Procedure completion:  Tolerated well, no immediate complications               Adonis Blankenship MD  01/01/24 [id number]     normal...

## 2024-10-27 ENCOUNTER — INPATIENT (INPATIENT)
Facility: HOSPITAL | Age: 84
LOS: 4 days | Discharge: HOME | End: 2024-11-01
Attending: HOSPITALIST | Admitting: INTERNAL MEDICINE
Payer: MEDICARE

## 2024-10-27 VITALS
SYSTOLIC BLOOD PRESSURE: 133 MMHG | RESPIRATION RATE: 18 BRPM | HEART RATE: 74 BPM | OXYGEN SATURATION: 95 % | TEMPERATURE: 97 F | WEIGHT: 175.93 LBS | DIASTOLIC BLOOD PRESSURE: 67 MMHG

## 2024-10-27 DIAGNOSIS — G47.33 OBSTRUCTIVE SLEEP APNEA (ADULT) (PEDIATRIC): ICD-10-CM

## 2024-10-27 DIAGNOSIS — R94.31 ABNORMAL ELECTROCARDIOGRAM [ECG] [EKG]: ICD-10-CM

## 2024-10-27 DIAGNOSIS — J96.01 ACUTE RESPIRATORY FAILURE WITH HYPOXIA: ICD-10-CM

## 2024-10-27 DIAGNOSIS — K21.9 GASTRO-ESOPHAGEAL REFLUX DISEASE WITHOUT ESOPHAGITIS: ICD-10-CM

## 2024-10-27 DIAGNOSIS — E78.5 HYPERLIPIDEMIA, UNSPECIFIED: ICD-10-CM

## 2024-10-27 DIAGNOSIS — N17.9 ACUTE KIDNEY FAILURE, UNSPECIFIED: ICD-10-CM

## 2024-10-27 DIAGNOSIS — I10 ESSENTIAL (PRIMARY) HYPERTENSION: ICD-10-CM

## 2024-10-27 DIAGNOSIS — I50.9 HEART FAILURE, UNSPECIFIED: ICD-10-CM

## 2024-10-27 DIAGNOSIS — I25.10 ATHEROSCLEROTIC HEART DISEASE OF NATIVE CORONARY ARTERY WITHOUT ANGINA PECTORIS: ICD-10-CM

## 2024-10-27 DIAGNOSIS — Z98.890 OTHER SPECIFIED POSTPROCEDURAL STATES: Chronic | ICD-10-CM

## 2024-10-27 DIAGNOSIS — Z29.9 ENCOUNTER FOR PROPHYLACTIC MEASURES, UNSPECIFIED: ICD-10-CM

## 2024-10-27 DIAGNOSIS — L03.90 CELLULITIS, UNSPECIFIED: ICD-10-CM

## 2024-10-27 DIAGNOSIS — A41.9 SEPSIS, UNSPECIFIED ORGANISM: ICD-10-CM

## 2024-10-27 PROBLEM — F32.9 MAJOR DEPRESSIVE DISORDER, SINGLE EPISODE, UNSPECIFIED: Chronic | Status: ACTIVE | Noted: 2021-09-03

## 2024-10-27 PROBLEM — K59.00 CONSTIPATION, UNSPECIFIED: Chronic | Status: ACTIVE | Noted: 2021-09-08

## 2024-10-27 PROBLEM — K59.00 CONSTIPATION, UNSPECIFIED: Chronic | Status: ACTIVE | Noted: 2021-09-03

## 2024-10-27 PROBLEM — L03.119 CELLULITIS OF UNSPECIFIED PART OF LIMB: Chronic | Status: ACTIVE | Noted: 2021-09-03

## 2024-10-27 PROBLEM — M19.90 UNSPECIFIED OSTEOARTHRITIS, UNSPECIFIED SITE: Chronic | Status: ACTIVE | Noted: 2021-09-03

## 2024-10-27 PROBLEM — Z87.438 PERSONAL HISTORY OF OTHER DISEASES OF MALE GENITAL ORGANS: Chronic | Status: ACTIVE | Noted: 2021-09-08

## 2024-10-27 PROBLEM — F41.9 ANXIETY DISORDER, UNSPECIFIED: Chronic | Status: ACTIVE | Noted: 2021-09-08

## 2024-10-27 PROBLEM — N18.9 CHRONIC KIDNEY DISEASE, UNSPECIFIED: Chronic | Status: ACTIVE | Noted: 2021-09-03

## 2024-10-27 PROBLEM — I50.22 CHRONIC SYSTOLIC (CONGESTIVE) HEART FAILURE: Chronic | Status: ACTIVE | Noted: 2021-09-08

## 2024-10-27 PROBLEM — N18.9 CHRONIC KIDNEY DISEASE, UNSPECIFIED: Chronic | Status: ACTIVE | Noted: 2021-09-08

## 2024-10-27 PROBLEM — F32.9 MAJOR DEPRESSIVE DISORDER, SINGLE EPISODE, UNSPECIFIED: Chronic | Status: ACTIVE | Noted: 2021-09-08

## 2024-10-27 LAB
ADD ON TEST-SPECIMEN IN LAB: SIGNIFICANT CHANGE UP
ADD ON TEST-SPECIMEN IN LAB: SIGNIFICANT CHANGE UP
ALBUMIN SERPL ELPH-MCNC: 3.3 G/DL — SIGNIFICANT CHANGE UP (ref 3.3–5)
ALP SERPL-CCNC: 74 U/L — SIGNIFICANT CHANGE UP (ref 40–120)
ALT FLD-CCNC: 17 U/L — SIGNIFICANT CHANGE UP (ref 10–45)
ANION GAP SERPL CALC-SCNC: 10 MMOL/L — SIGNIFICANT CHANGE UP (ref 5–17)
ANISOCYTOSIS BLD QL: SLIGHT — SIGNIFICANT CHANGE UP
APPEARANCE UR: CLEAR — SIGNIFICANT CHANGE UP
AST SERPL-CCNC: 34 U/L — SIGNIFICANT CHANGE UP (ref 10–40)
BACTERIA # UR AUTO: NEGATIVE /HPF — SIGNIFICANT CHANGE UP
BASOPHILS # BLD AUTO: 0 K/UL — SIGNIFICANT CHANGE UP (ref 0–0.2)
BASOPHILS NFR BLD AUTO: 0 % — SIGNIFICANT CHANGE UP (ref 0–2)
BILIRUB DIRECT SERPL-MCNC: 0.2 MG/DL — SIGNIFICANT CHANGE UP (ref 0–0.3)
BILIRUB INDIRECT FLD-MCNC: 0.8 MG/DL — SIGNIFICANT CHANGE UP (ref 0.2–1)
BILIRUB SERPL-MCNC: 1 MG/DL — SIGNIFICANT CHANGE UP (ref 0.2–1.2)
BILIRUB UR-MCNC: NEGATIVE — SIGNIFICANT CHANGE UP
BUN SERPL-MCNC: 38 MG/DL — HIGH (ref 7–23)
CALCIUM SERPL-MCNC: 8.8 MG/DL — SIGNIFICANT CHANGE UP (ref 8.4–10.5)
CAST: 1 /LPF — SIGNIFICANT CHANGE UP (ref 0–4)
CHLORIDE SERPL-SCNC: 97 MMOL/L — SIGNIFICANT CHANGE UP (ref 96–108)
CK MB CFR SERPL CALC: 3.5 NG/ML — SIGNIFICANT CHANGE UP (ref 0–6.7)
CK SERPL-CCNC: 113 U/L — SIGNIFICANT CHANGE UP (ref 30–200)
CO2 SERPL-SCNC: 32 MMOL/L — HIGH (ref 22–31)
COLOR SPEC: YELLOW — SIGNIFICANT CHANGE UP
CREAT SERPL-MCNC: 2.12 MG/DL — HIGH (ref 0.5–1.3)
CRP SERPL-MCNC: 219.7 MG/L — HIGH (ref 0–4)
DIFF PNL FLD: NEGATIVE — SIGNIFICANT CHANGE UP
EGFR: 30 ML/MIN/1.73M2 — LOW
EOSINOPHIL # BLD AUTO: 0 K/UL — SIGNIFICANT CHANGE UP (ref 0–0.5)
EOSINOPHIL NFR BLD AUTO: 0 % — SIGNIFICANT CHANGE UP (ref 0–6)
ERYTHROCYTE [SEDIMENTATION RATE] IN BLOOD: 34 MM/HR — HIGH
GLUCOSE SERPL-MCNC: 125 MG/DL — HIGH (ref 70–99)
GLUCOSE UR QL: NEGATIVE MG/DL — SIGNIFICANT CHANGE UP
HCT VFR BLD CALC: 41.4 % — SIGNIFICANT CHANGE UP (ref 39–50)
HGB BLD-MCNC: 13 G/DL — SIGNIFICANT CHANGE UP (ref 13–17)
KETONES UR-MCNC: NEGATIVE MG/DL — SIGNIFICANT CHANGE UP
LACTATE SERPL-SCNC: 2.1 MMOL/L — HIGH (ref 0.5–2)
LACTATE SERPL-SCNC: 2.3 MMOL/L — HIGH (ref 0.5–2)
LEUKOCYTE ESTERASE UR-ACNC: NEGATIVE — SIGNIFICANT CHANGE UP
LYMPHOCYTES # BLD AUTO: 0.36 K/UL — LOW (ref 1–3.3)
LYMPHOCYTES # BLD AUTO: 2.6 % — LOW (ref 13–44)
MACROCYTES BLD QL: SLIGHT — SIGNIFICANT CHANGE UP
MANUAL SMEAR VERIFICATION: SIGNIFICANT CHANGE UP
MCHC RBC-ENTMCNC: 29.6 PG — SIGNIFICANT CHANGE UP (ref 27–34)
MCHC RBC-ENTMCNC: 31.4 GM/DL — LOW (ref 32–36)
MCV RBC AUTO: 94.3 FL — SIGNIFICANT CHANGE UP (ref 80–100)
MONOCYTES # BLD AUTO: 1.09 K/UL — HIGH (ref 0–0.9)
MONOCYTES NFR BLD AUTO: 7.9 % — SIGNIFICANT CHANGE UP (ref 2–14)
NEUTROPHILS # BLD AUTO: 12.37 K/UL — HIGH (ref 1.8–7.4)
NEUTROPHILS NFR BLD AUTO: 89.5 % — HIGH (ref 43–77)
NITRITE UR-MCNC: NEGATIVE — SIGNIFICANT CHANGE UP
NT-PROBNP SERPL-SCNC: 8629 PG/ML — HIGH (ref 0–300)
OSMOLALITY UR: 431 MOSM/KG — SIGNIFICANT CHANGE UP (ref 300–900)
OVALOCYTES BLD QL SMEAR: SIGNIFICANT CHANGE UP
PH UR: 7 — SIGNIFICANT CHANGE UP (ref 5–8)
PLAT MORPH BLD: NORMAL — SIGNIFICANT CHANGE UP
PLATELET # BLD AUTO: 125 K/UL — LOW (ref 150–400)
POIKILOCYTOSIS BLD QL AUTO: SIGNIFICANT CHANGE UP
POTASSIUM SERPL-MCNC: 4.6 MMOL/L — SIGNIFICANT CHANGE UP (ref 3.5–5.3)
POTASSIUM SERPL-SCNC: 4.6 MMOL/L — SIGNIFICANT CHANGE UP (ref 3.5–5.3)
POTASSIUM UR-SCNC: 63 MMOL/L — SIGNIFICANT CHANGE UP
PROT SERPL-MCNC: 7.3 G/DL — SIGNIFICANT CHANGE UP (ref 6–8.3)
PROT UR-MCNC: 300 MG/DL
RBC # BLD: 4.39 M/UL — SIGNIFICANT CHANGE UP (ref 4.2–5.8)
RBC # FLD: 14.5 % — SIGNIFICANT CHANGE UP (ref 10.3–14.5)
RBC BLD AUTO: ABNORMAL
RBC CASTS # UR COMP ASSIST: 2 /HPF — SIGNIFICANT CHANGE UP (ref 0–4)
SCHISTOCYTES BLD QL AUTO: SLIGHT — SIGNIFICANT CHANGE UP
SODIUM SERPL-SCNC: 139 MMOL/L — SIGNIFICANT CHANGE UP (ref 135–145)
SODIUM UR-SCNC: 63 MMOL/L — SIGNIFICANT CHANGE UP
SP GR SPEC: 1.01 — SIGNIFICANT CHANGE UP (ref 1–1.03)
SQUAMOUS # UR AUTO: 0 /HPF — SIGNIFICANT CHANGE UP (ref 0–5)
TARGETS BLD QL SMEAR: SLIGHT — SIGNIFICANT CHANGE UP
TROPONIN T, HIGH SENSITIVITY RESULT: 69 NG/L — CRITICAL HIGH (ref 0–51)
TROPONIN T, HIGH SENSITIVITY RESULT: 76 NG/L — CRITICAL HIGH (ref 0–51)
UROBILINOGEN FLD QL: 1 MG/DL — SIGNIFICANT CHANGE UP (ref 0.2–1)
WBC # BLD: 13.82 K/UL — HIGH (ref 3.8–10.5)
WBC # FLD AUTO: 13.82 K/UL — HIGH (ref 3.8–10.5)
WBC UR QL: 0 /HPF — SIGNIFICANT CHANGE UP (ref 0–5)

## 2024-10-27 PROCEDURE — 99285 EMERGENCY DEPT VISIT HI MDM: CPT | Mod: FS

## 2024-10-27 PROCEDURE — 99221 1ST HOSP IP/OBS SF/LOW 40: CPT

## 2024-10-27 PROCEDURE — 71045 X-RAY EXAM CHEST 1 VIEW: CPT | Mod: 26

## 2024-10-27 PROCEDURE — 93010 ELECTROCARDIOGRAM REPORT: CPT

## 2024-10-27 PROCEDURE — 99223 1ST HOSP IP/OBS HIGH 75: CPT | Mod: GC

## 2024-10-27 RX ORDER — ONDANSETRON HYDROCHLORIDE 2 MG/ML
4 INJECTION, SOLUTION INTRAMUSCULAR; INTRAVENOUS EVERY 8 HOURS
Refills: 0 | Status: DISCONTINUED | OUTPATIENT
Start: 2024-10-27 | End: 2024-11-01

## 2024-10-27 RX ORDER — CEFAZOLIN SODIUM 1 G
1000 VIAL (EA) INJECTION EVERY 12 HOURS
Refills: 0 | Status: DISCONTINUED | OUTPATIENT
Start: 2024-10-27 | End: 2024-10-28

## 2024-10-27 RX ORDER — ENOXAPARIN SODIUM 40MG/0.4ML
30 SYRINGE (ML) SUBCUTANEOUS EVERY 24 HOURS
Refills: 0 | Status: DISCONTINUED | OUTPATIENT
Start: 2024-10-27 | End: 2024-11-01

## 2024-10-27 RX ORDER — METOPROLOL TARTRATE 50 MG
50 TABLET ORAL EVERY 12 HOURS
Refills: 0 | Status: DISCONTINUED | OUTPATIENT
Start: 2024-10-27 | End: 2024-10-27

## 2024-10-27 RX ORDER — ACETAMINOPHEN 500 MG
650 TABLET ORAL EVERY 6 HOURS
Refills: 0 | Status: DISCONTINUED | OUTPATIENT
Start: 2024-10-27 | End: 2024-11-01

## 2024-10-27 RX ORDER — FUROSEMIDE 40 MG
20 TABLET ORAL DAILY
Refills: 0 | Status: DISCONTINUED | OUTPATIENT
Start: 2024-10-28 | End: 2024-10-28

## 2024-10-27 RX ORDER — METOPROLOL TARTRATE 50 MG
50 TABLET ORAL EVERY 12 HOURS
Refills: 0 | Status: DISCONTINUED | OUTPATIENT
Start: 2024-10-27 | End: 2024-10-28

## 2024-10-27 RX ORDER — CLONIDINE HYDROCHLORIDE 0.2 MG/1
0.1 TABLET ORAL
Refills: 0 | Status: DISCONTINUED | OUTPATIENT
Start: 2024-10-27 | End: 2024-10-31

## 2024-10-27 RX ORDER — PANTOPRAZOLE SODIUM 40 MG/1
40 TABLET, DELAYED RELEASE ORAL
Refills: 0 | Status: DISCONTINUED | OUTPATIENT
Start: 2024-10-27 | End: 2024-11-01

## 2024-10-27 RX ORDER — B-COMPLEX WITH VITAMIN C
1 VIAL (ML) INJECTION DAILY
Refills: 0 | Status: DISCONTINUED | OUTPATIENT
Start: 2024-10-27 | End: 2024-11-01

## 2024-10-27 RX ORDER — GABAPENTIN 300 MG/1
1 CAPSULE ORAL
Refills: 0 | DISCHARGE

## 2024-10-27 RX ORDER — FUROSEMIDE 40 MG
20 TABLET ORAL ONCE
Refills: 0 | Status: COMPLETED | OUTPATIENT
Start: 2024-10-27 | End: 2024-10-27

## 2024-10-27 RX ORDER — PIPERACILLIN AND TAZOBACTAM .5; 4 G/20ML; G/20ML
3.38 INJECTION, POWDER, LYOPHILIZED, FOR SOLUTION INTRAVENOUS ONCE
Refills: 0 | Status: COMPLETED | OUTPATIENT
Start: 2024-10-27 | End: 2024-10-27

## 2024-10-27 RX ORDER — VANCOMYCIN HYDROCHLORIDE 50 MG/ML
1000 KIT ORAL ONCE
Refills: 0 | Status: COMPLETED | OUTPATIENT
Start: 2024-10-27 | End: 2024-10-27

## 2024-10-27 RX ORDER — SODIUM CHLORIDE 9 MG/ML
500 INJECTION, SOLUTION INTRAMUSCULAR; INTRAVENOUS; SUBCUTANEOUS ONCE
Refills: 0 | Status: COMPLETED | OUTPATIENT
Start: 2024-10-27 | End: 2024-10-27

## 2024-10-27 RX ADMIN — PIPERACILLIN AND TAZOBACTAM 200 GRAM(S): .5; 4 INJECTION, POWDER, LYOPHILIZED, FOR SOLUTION INTRAVENOUS at 12:54

## 2024-10-27 RX ADMIN — Medication 1 TABLET(S): at 22:12

## 2024-10-27 RX ADMIN — Medication 20 MILLIGRAM(S): at 14:44

## 2024-10-27 RX ADMIN — Medication 50 MILLIGRAM(S): at 20:49

## 2024-10-27 RX ADMIN — SODIUM CHLORIDE 500 MILLILITER(S): 9 INJECTION, SOLUTION INTRAMUSCULAR; INTRAVENOUS; SUBCUTANEOUS at 13:01

## 2024-10-27 RX ADMIN — VANCOMYCIN HYDROCHLORIDE 250 MILLIGRAM(S): KIT at 14:03

## 2024-10-27 RX ADMIN — Medication 40 MILLIGRAM(S): at 22:09

## 2024-10-27 RX ADMIN — Medication 100 MILLIGRAM(S): at 22:08

## 2024-10-27 RX ADMIN — CLONIDINE HYDROCHLORIDE 0.1 MILLIGRAM(S): 0.2 TABLET ORAL at 20:49

## 2024-10-27 NOTE — H&P ADULT - PROBLEM SELECTOR PLAN 6
Pt w/ hx of CAD (last cardiac cath 18yrs ago). EKG w/ new T wave inversions and previous RBBB, left axis deviation. Denies CP, SOB, palpitations. Pt w/ hx of CAD (last cardiac cath 18yrs ago). EKG w/ new T wave inversions and previous RBBB, left axis deviation. Denies CP, SOB, palpitations.  - c/w asa/lipitor

## 2024-10-27 NOTE — H&P ADULT - PROBLEM SELECTOR PLAN 11
Fluids:  Electrolytes:  Nutrition:       PPI ppx:   Dvt ppx:  Activity:   Dispo: Fluids:  Electrolytes:  Nutrition:   PPI ppx: pantoprazole 40mg QD  Dvt ppx: lovenox 30mg (renally dosed)   Activity:   Dispo: On previous admission patient was noted to have PEDRITO.  - CPAP during hospitalization if patient tolerates it On previous admission patient was noted to have PEDRITO. Patient reports that he does not use a CPAP machine at home but he is amendable to trying it.   - CPAP overnight if tolerated

## 2024-10-27 NOTE — ED ADULT NURSE NOTE - IN THE PAST 12 MONTHS HAVE YOU USED DRUGS OTHER THAN THOSE REQUIRED FOR MEDICAL REASON?
Occupational Therapy Evaluation    Visit Count: 1  Plan of Care: Initial: 12/6/2021 Through: 12/6/2021  Insurance Information: occupational therapy cap of $2040 per calendar year  Referred by: Rosangela Monahan PA-C  Medical Diagnosis (from order):   Diagnosis Information      Diagnosis    716.96 (ICD-9-CM) - M17.11 (ICD-10-CM) - Arthritis of right knee              Treatment Diagnosis: Knee Symptoms with increased pain/symptoms, impaired strength, impaired range of motion, impaired joint mobility/play, impaired gait    Anticipated Date of Surgery: 1/4/21; Surgery to be performed:  Right Total Knee Arthroplasty  Chart reviewed at time of initial evaluation (relevant co-morbidities, allergies, tests and medications listed):   Past Medical History:   Diagnosis Date   • Abdominal pain, left lower quadrant    • Backache, unspecified    • 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   • CAD (coronary artery disease), native coronary artery 11/19/2018    CT Cardiac Calcium Score: Total = 385 (RCA = 107, LAD = 227, LCx = 51)    • Hyperlipidemia 05/03/2011   • Paroxysmal SVT (supraventricular tachycardia) (CMS/HCC) 03/2021    2 runs: 20 and 17 beats on event monitor   • PONV (postoperative nausea and vomiting)    • Postoperative atrial fibrillation (CMS/HCC) 06/12/2020   • Right knee pain 2020    s/p injections   • Sciatica 04/07/2005       • Sebaceous cyst     Excised from shoulder/neck     Past Surgical History:   Procedure Laterality Date   • Anesth,knee arthroscopy  1996    right knee   • Cardiac catherization  06/03/2020    [place] [facility] [place] occlusion of the proximal left Cx with faint to moderate collaterals from the LAD to 2 of the marginal branches that appear to be significant in caliber and most likely good targets for bypass. LVEF 60%. Referral to CV Surgery for bypass.   • Colonoscopy diagnostic  2/04    Hyperplastic polyps, Dr Jones   • Colonoscopy diagnostic  3/10/2011      Robert, recall colonoscopy 10 years due 3/10/2021   • Colonoscopy diagnostic  04/08/2021    Dr. Phillips, normal, 10 yr recall   • Coronary artery [place]11/2020     [place] [facility] [place] and SVG-->OM2   • Cyst removal  1995    removed cyst from neck; mid 1990s   • Cyst removal Right 1995    removed cyst from right shoulder; mid 1990s   • Esophagogastroduodenoscopy transoral flex w/bx single or mult  3/10/2011    Dr Jones   • Knee scope,diagnostic  1998    Knee Arthroscopy right   • Removal of sperm duct(s)  1995    and removed neck cyst at the same time; mid 1990s     Current Outpatient Medications   Medication Sig   • MAGNESIUM PO Patient unsure of dose.   • Multiple Vitamins-Minerals (ZINC PO) Patient unsure of dose.   • omeprazole (PriLOSEC) 40 MG capsule TAKE 1 CAPSULE BY MOUTH DAILY 30 MINUTES BEFORE FIRST MEAL   • rosuvastatin (CRESTOR) 20 MG tablet TAKE 1 TABLET BY MOUTH DAILY   • sildenafil (REVATIO) 20 MG tablet Take 2-3 tablets by mouth daily as needed (erectile dysfunction).   • clindamycin (CLEOCIN T) 1 % topical solution Apply twice daily as needed to bumps on the scalp until clear.   • tacrolimus (PROTOPIC) 0.1 % ointment Apply twice daily to spots on penis until clear   • ketoconazole (NIZORAL) 2 % shampoo APPLY TO SCALP 1 TO 2 TIMES WEEKLY. LET SIT FOR 2 MINUTES PRIOR TO WASHING OFF   • metoPROLOL tartrate (LOPRESSOR) 25 MG tablet Take 1 tablet by mouth 2 times daily.   • aspirin (ASPIRIN 81) 81 MG chewable tablet Chew 1 tablet by mouth daily.   • difluprednate (DUREZOL) 0.05 % ophthalmic emulsion 1 drop four times daily to affected eye (Patient taking differently: 1 drop 4 times daily as needed. 1 drop four times daily to affected eye)   • nabumetone (RELAFEN) 750 MG tablet TAKE 1 TABLET BY MOUTH TWICE DAILY WITH FOOD FOR INFLAMMATION AND PAIN (Patient taking differently: as needed. )     No current facility-administered medications for this visit.       SUBJECTIVE   Description  No of Problem and/or Mechanism of Injury: ***   Pain:  Intensity (0-10 scale): Current: ***; Pain Range (best-worst): ***  Location: {pmr tka:110743} knee ***  Quality/Description: {ache:647272}  Relieving/Alleviating factors: {alleviating factors:969136}    Function:  Limitations and exacerbating factors (patient reported): {limitations pattern:923555::\"pain\",\"difficulty\"} with {LE limitations +:266557}  Prior level (patient reported): {PLOF:107210::\"increasing pain and difficulty with function, therefore planning surgery to regain function\"}  Personal Occupations Profile Affected: {Occupations Profile:539694}  Patient Goal: ***    Prior Treatment: {pmr prior treatment:052623} in the past year for current condition. Hospitalization, home health services or skilled nursing facility in the last 30 days: {yes no:562102}  Home Environment/Social Support: Patient lives {pmr social support/home:899746}.   Patient has {assist available:578641}.    Safety:  {safety and balance, MUST answer:920604}  Cognitive screen: {yes/no:794778}  Anticipated discharge disposition post-operatively: {discharge disposition list:587403}    OBJECTIVE   Posture/Observation:  ***    Gait Without Assistive Device:  ***    Range of Motion:    Left Right   Date Initial Initial   Shoulder Flexion (170-180) ***    Shoulder Extension (50-60)     Shoulder Abduction (170-180)     Shoulder Adduction (50-75)     Shoulder Internal Rotation ()     Shoulder External Rotation (80-90)     Elbow Flexion (140-150)     Elbow Extension (0-10)     Forearm Supination (90)     Forearm Pronation (90)     Wrist Flexion (80)     Wrist Extension (70)     Wrist Radial Deviation (20)     Wrist Ulnar Deviation (45)     Reported in degrees, active range of motion recorded unless noted as AA=active assistive or P=passive; standard testing positions unless otherwise noted, norms included in ( ); *=pain   {ROM comments:894805}    Strength (out of 5)    Left Right   Date  Initial Initial   Shoulder Flexors ***    Shoulder Extensors      Shoulder Abductors     Shoulder Adductors     Shoulder Internal Rotators     Shoulder External Rotators      Elbow Flexors     Elbow Extensors      Forearm Supinators      Forearm Pronators     Wrist Flexors     Wrist Extensors      Wrist Radial Deviators      Wrist Ulnar Deviators     standard testing positions unless otherwise noted; *=pain  {strength comments:063462}    {balance and out come:014069}    Initial Treatment   Initial evaluation completed.    Therapeutic Exercise:  {pmr ot exercises:419915}  Sets: ***, Repetitions: ***, Theraband: {TB color:414994}  Written handout provided for patient.*     Therapeutic Activity:  Patient instruction in the following while maintaining precautions of {pmr WB words:830574} as appropriate:  1. Toilet Transfers: proper sequencing to complete toilet transfer with use of {TRANSFER ASSISTIVE DEVICE:741804}  2. Bathing Techniques: proper techniques to use for upper body and lower body bathing completion. Patient instructed on how to use the following adaptive equipment to complete bathing: {IP ADAPTIVE EQUIPMENT:849809}  3. Dressing Techniques: proper techniques to use for upper body and lower body dressing completion. Patient instructed on how to use the following adaptive equipment to complete dressing: {IP ADAPTIVE EQUIPMENT:456233}  4. Shower Transfer: proper sequencing to complete {TUB SETUPS:790813} transfer using {TRANSFER ASSISTIVE DEVICE:248672}  5. Activities of Daily Living Functional Mobility: use of {TRANSFER ASSISTIVE DEVICE:782503}: {IPOT ADL FXNAL MOBILITY:763752}  6. ***      Skilled input: {informed consent:665972}    Writer verbally educated the patient and received verbal consent from the patient on hand placement, positioning of patient, and techniques to be performed today including {informed consent:677132} as described above and how they are pertinent to the patient's plan of care.      ASSESSMENT   66 year old male patient has signs and symptoms consistent with knee pain and deficits that has reported functional limitations listed above. Patient would benefit from skilled inpatient physical and occupational therapy consults post operatively to ensure safety for discharge from hospital. Recommend {1X recommendations:079789}.    Patient will benefit from skilled therapy and rehab potential is {pmr rehab potential:253169::\"good based on assessment above\"}   Clinical decision making: {Clinical decision makin}    PLAN   Goals:  To be obtained by end of this plan of care:  1. Patient independent with modified and progressed home exercise program. ({STATUS:749357::\"MET\"})  2. {OT prehab goals:442311}    The following skilled interventions to be implemented to achieve above:  {pmr preop thoracic:945215::\"Therapeutic Activity (63960)\",\"Therapeutic Exercise (57598)\"}    Frequency/Duration: {frequency/duration prehab:442716::\"patient seen one time for instruction per above prior to surgery\"}    The plan of care and goals were established with the {PATIENT:311115::\"patient\"} who concurs.  Patient has been given attendance policy at time of initial evaluation.    Patient Education:  Who will be receiving education: {PATIENT:780607::\"patient\"}  Are they ready to learn: {pmr yes no:751044::\"yes\"}  Preferred learning style: {LEARNING STYLE:922679::\"written\",\"verbal\",\"demonstration\"}  Barriers to learning: {BARRIERS TO LEARNIN::\"no barriers apparent at this time \"}  Result of initial outlined education: {EDUCATION RESULTS:183557}    Therapy procedure time and total treatment time can be found documented on the Time Entry flowsheet.Occupational Therapy     Referred by: Rosangela Monahan PA-C; Medical Diagnosis (from order):    Diagnosis Information      Diagnosis    716.96 (ICD-9-CM) - M17.11 (ICD-10-CM) - Arthritis of right knee                   OBJECTIVE                                                                                                                                 Therapy procedure time and total treatment time can be found documented on the Time Entry flowsheet

## 2024-10-27 NOTE — ED PROVIDER NOTE - OBJECTIVE STATEMENT
The pt is a 85 y/o M, PMH HTN, HLD, CAD, CHF, GERD, anxiety/depression, brought to ED by family, for leg swelling, redness and blisters x wk. Redness getting worse, swelling unchanged. Denies fevers, chills, falls, sob, cp, n/v/d. The pt is a 85 y/o M, PMH HTN, HLD, CAD, CHF, CKD, GERD, anxiety/depression, brought to ED by family, for leg swelling, redness and blisters x wk. Redness getting worse, swelling unchanged. Denies fevers, chills, falls, sob, cp, n/v/d. The pt is a 85 y/o M, poor historian, PMH HTN, HLD, CAD, CHF, CKD, GERD, anxiety/depression, brought to ED by family, for leg swelling, redness and blisters x wk. Redness getting worse, swelling unchanged. Denies fevers, chills, falls, sob, cp, n/v/d.

## 2024-10-27 NOTE — CONSULT NOTE ADULT - SUBJECTIVE AND OBJECTIVE BOX
Consult reason: respiratory distress  ED vitals: T 97.5  HR 64  RR 20 /80 SpO2 95% on 2L NC  Labs signficant: WCC 13.82 w/ 99.5% neutrophils, plt 125, lactate 2.3 -> 2.1, BUN 38, Cr 2.12, BNP 8.6k  Imaging/EKG: CXR w/ clear lung fields  Interventions: zosyn 3.375g, vanc 1g, IV saline 500ml, lasix 20mg IV    HPI:  84M w/ PMH of HTN, HLD, CAD, CHF, CKD, GERD, anxiety/depression, brought to ED by family, for leg swelling, redness and blisters x wk. Redness getting worse, swelling unchanged. Denies fevers, chills, falls, sob, cp, n/v/d.     While in the ED patient received 500ml IV saline bolus which resulted in respiratory distress treated w/ 20mg IV lasix w/ subsequent resolution of his symptoms. ICU consulted for tele monitoring iso acute HF exacerbation.      Allergies    No Known Allergies    Intolerances    Home Medications:  amLODIPine 10 mg oral tablet: 1 tab(s) orally every 24 hours (05 Sep 2021 14:36)  aspirin 325 mg oral tablet: 1 tab(s) orally once a day (03 Sep 2021 18:07)  cloNIDine 0.1 mg oral tablet: 1 tab(s) orally 2 times a day (03 Sep 2021 18:07)  doxepin 10 mg oral capsule: 10 milligram(s) orally once a day (at bedtime) (03 Sep 2021 18:07)  Fish Oil 1000 mg oral capsule: 1 cap(s) orally once a day (03 Sep 2021 18:07)  hydroCHLOROthiazide 12.5 mg oral tablet: 1 tab(s) orally once a day (03 Sep 2021 18:07)  Lipitor 40 mg oral tablet: 1 tab(s) orally once a day (03 Sep 2021 18:07)  metoprolol succinate 50 mg oral tablet, extended release: 50  orally 2 times a day (03 Sep 2021 18:07)  Multiple Vitamins oral tablet: 1 tab(s) orally once a day (03 Sep 2021 18:07)  omeprazole 20 mg oral delayed release capsule: 1 cap(s) orally once a day (03 Sep 2021 18:07)    SOCIAL HX:     Smoking          ETOH/Illicit drugs          Occupation    PAST MEDICAL & SURGICAL HISTORY:  HTN (hypertension)  Arthritis  GERD (gastroesophageal reflux disease)  Chronic CHF  CAD (coronary artery disease)  Constipation  Depression  Chronic renal insufficiency  Cellulitis, leg  HLD (hyperlipidemia)  History of BPH  Anxiety  Chronic renal insufficiency  H/O hernia repair    FAMILY HISTORY:  No pertinent family history in first degree relatives  No known cardiovascular or pulmonary family history     ROS:  See HPI     PHYSICAL EXAM    ICU Vital Signs Last 24 Hrs  T(C): 36.4 (27 Oct 2024 14:25), Max: 36.4 (27 Oct 2024 14:25)  T(F): 97.5 (27 Oct 2024 14:25), Max: 97.5 (27 Oct 2024 14:25)  HR: 64 (27 Oct 2024 14:25) (64 - 74)  BP: 130/81 (27 Oct 2024 14:25) (130/81 - 133/67)  BP(mean): --  ABP: --  ABP(mean): --  RR: 20 (27 Oct 2024 14:32) (18 - 22)  SpO2: 95% (27 Oct 2024 14:32) (92% - 95%)    O2 Parameters below as of 27 Oct 2024 14:32  Patient On (Oxygen Delivery Method): nasal cannula  O2 Flow (L/min): 2    General: Not in distress  HEENT:  ELIZABETH              Lymphatic system: No LN  Lungs: Bilateral BS  Cardiovascular: Regular  Gastrointestinal: Soft, Positive BS  Musculoskeletal: No clubbing.  Moves all extremities.    Skin: Warm.  Intact  Neurological: No motor or sensory deficit     LABS:                          13.0   13.82 )-----------( 125      ( 27 Oct 2024 12:33 )             41.4                                               10-27    139  |  97  |  38[H]  ----------------------------<  125[H]  4.6   |  32[H]  |  2.12[H]    Ca    8.8      27 Oct 2024 12:33                 CXR: no infiltrates, clear lung fields         Consult reason: respiratory distress  ED vitals: T 97.5  HR 64  RR 20 /80 SpO2 95% on 2L NC  Labs signficant: WCC 13.82 w/ 99.5% neutrophils, plt 125, lactate 2.3 -> 2.1, BUN 38, Cr 2.12, BNP 8.6k  Imaging/EKG: CXR w/ clear lung fields  Interventions: zosyn 3.375g, vanc 1g, IV saline 500ml, lasix 20mg IV    HPI:  84M w/ PMH of HTN, HLD, CAD (last PCI 18y ago), CHF (no echo on file), CKD (Cr 1.4 in 9/21), GERD, stroke (residual RUE weakness), anxiety/depression, brought to ED by girlfrienwilmer Martínez, for 2 day history of progressive leg swelling, redness and blisters. Denies fevers, chills, cough, N/V, sob, cp, abdo pain. Patient does not remember his CHF history or medication and could not recall his last echo. Pt claims to not have a cardiologist and only follows with his PCP (unknown name). Patient confirmed that he is taking the diuretic at home but could not remember the dose.     While in the ED patient received 500ml IV saline bolus which resulted in respiratory distress treated w/ 20mg IV lasix w/ subsequent resolution of his symptoms. ICU consulted for tele monitoring iso acute HF exacerbation.      Allergies  No Known Allergies    Home Medications:  Unknown medication history     PAST MEDICAL & SURGICAL HISTORY:  HTN (hypertension)  Arthritis  GERD (gastroesophageal reflux disease)  Chronic CHF  CAD (coronary artery disease)  Constipation  Depression  Chronic renal insufficiency  Cellulitis, leg  HLD (hyperlipidemia)  History of BPH  Anxiety  Chronic renal insufficiency  H/O hernia repair    ROS:  See HPI     PHYSICAL EXAM    ICU Vital Signs Last 24 Hrs  T(C): 36.4 (27 Oct 2024 14:25), Max: 36.4 (27 Oct 2024 14:25)  T(F): 97.5 (27 Oct 2024 14:25), Max: 97.5 (27 Oct 2024 14:25)  HR: 64 (27 Oct 2024 14:25) (64 - 74)  BP: 130/81 (27 Oct 2024 14:25) (130/81 - 133/67)  BP(mean): --  ABP: --  ABP(mean): --  RR: 20 (27 Oct 2024 14:32) (18 - 22)  SpO2: 95% (27 Oct 2024 14:32) (92% - 95%)    O2 Parameters below as of 27 Oct 2024 14:32  Patient On (Oxygen Delivery Method): nasal cannula  O2 Flow (L/min): 2    General: Not in distress  HEENT:  ELIZABETH              Lymphatic system: No LN  Lungs: clear to auscultation b/l  Cardiovascular: HS I+II+0   Gastrointestinal: Soft, Positive BS  Skin: Warm.  Intact. b/l LE edema, erythema and tenderness to the level above the knees, most pronounced around the ankles. Areas of skin breakage around the ankles w/ serous fluid draining, no pus.   Neurological: RUE 2/5 strength     LABS:                          13.0   13.82 )-----------( 125      ( 27 Oct 2024 12:33 )             41.4                                               10-27    139  |  97  |  38[H]  ----------------------------<  125[H]  4.6   |  32[H]  |  2.12[H]    Ca    8.8      27 Oct 2024 12:33                 CXR: no infiltrates, clear lung fields

## 2024-10-27 NOTE — ED ADULT NURSE NOTE - NSFALLRISKINTERV_ED_ALL_ED

## 2024-10-27 NOTE — H&P ADULT - TIME BILLING
Acitretin Counseling:  I discussed with the patient the risks of acitretin including but not limited to hair loss, dry lips/skin/eyes, liver damage, hyperlipidemia, depression/suicidal ideation, photosensitivity.  Serious rare side effects can include but are not limited to pancreatitis, pseudotumor cerebri, bony changes, clot formation/stroke/heart attack.  Patient understands that alcohol is contraindicated since it can result in liver toxicity and significantly prolong the elimination of the drug by many years. Bedside exam and interview      Reviewed vitals, labs      Discussed patient's plan of care with housestaff      Documentation of encounter     Excludes teaching time and/or separately reported services.

## 2024-10-27 NOTE — H&P ADULT - PROBLEM SELECTOR PLAN 3
b/l LE pain and worsened b/l LE swelling and erythema. nonpurulent. Received vanc/zosyn x1 in ED. Patient hospitalised in 2021 w/ similar presentation.   - s/p vancomycin 1000mg, zosyn 3.375mg in ED  - Start cefazolin 2g IV q8h b/l LE pain and worsened b/l LE swelling and erythema. nonpurulent. Received vanc/zosyn x1 in ED. Patient hospitalised in 2021 w/ similar presentation. Non-purulent drainage on exam today.   - s/p vancomycin 1000mg, zosyn 3.375mg in ED  - Start cefazolin 2g IV q8h Likely acute on chronic CHF in setting of IV fluid administration. On exam, chest clear to auscultation w/ b/l lower extremity edema to above knees, erythema and tenderness. Labs remarkable for BNP of 8.6k, Trop elevated to 76, normal CKMB. CXR revealed clear lung fields. EKG showing previous RBBB, new T wave inversions in V4/V5 compared to 2021. No previous echo available.   - f/u TTE  - c/w home medications:  - Wean O2 as tolerated above  - trend trop Likely acute on chronic CHF in setting of IV fluid administration. On exam, chest clear to auscultation w/ b/l lower extremity edema to above knees, erythema and tenderness.  No previous echo available. Labs remarkable for BNP of 8.6k, Trop elevated to 76, normal CKMB. CXR revealed clear lung fields. EKG w/ known RBBB/LAFB. New T-wave inversions noted on EKG today on V4/V5 compared to 2021. Pt denies CP. trops mildly elevated iso KALA, CK/CKMB wnl   - f/u TTE  - s/p lasix 20mg IVP with improvement of resp status  - continue lasix   - Strict I/Os  - cards consult   - Wean O2 as tolerated above  - trend trops

## 2024-10-27 NOTE — H&P ADULT - PROBLEM SELECTOR PLAN 1
Patient with a transiently low O2 saturation on room air after receiving 500cc bolus of normal saline in the ED. In the setting of bilateral lower extremity edema and a hx of CHF. Most likely flash pulmonary edema in the setting of heart failure.   - S/p 20mg IV lasix w/ improvement in hypoxemia  - ICU team consulted for flash pulmonary edema  - Wean O2 as tolerated Patient with a transiently low O2 saturation on room air after receiving 500cc bolus of normal saline in the ED. In the setting of bilateral lower extremity edema and a likely hx of CHF. Patient also reports 70 pack years of smoking. No wheezes or crackles on exam for admission. Most likely flash pulmonary edema in the setting of heart failure.    - S/p 20mg IV lasix w/ improvement in hypoxemia  - ICU team consulted for flash pulmonary edema  - Wean O2 as tolerated #Severe Sepsis 2/2 DARRIUS LE Cellulitis  Met SIRS w/ tachypnea & leukocytosis. Severe sepsis w/ lactic acidosis & end organ damage of elevated troponin, elevated creatinine. Lactate down trending s/p fluids. DARRIUS LE pain and worsened b/l LE swelling and erythema. nonpurulent. Received vanc/zosyn x1 in ED. Patient hospitalised in 2021 w/ similar presentation. Non-purulent drainage on exam today. Severe sepsis 2/2 DARRIUS LE cellulitis  - s/p vancomycin 1000mg, zosyn 3.375mg in ED  - cefazolin 1g IV Q12H (renally dosed; D1 10/27) for 7 days

## 2024-10-27 NOTE — CONSULT NOTE ADULT - ASSESSMENT
84M w/ PMH of HTN, HLD, CAD, CHF, CKD, GERD, anxiety/depression, brought to ED by family, for 1w history of leg swelling, redness and blisters found to have cellulitis, ED course c/b respiratory distress after IV fluids thought to be HF exacerbation improving after IV lasix. ICU consulted for tele monitoring iso HF exacerbation.     #AHRF  #CHF exacerbation  Likely acute on chronic CHF in setting of IV fluid adiministration. On exam, ***. Labs remarkable for BNP of 8.6k and Tn of ***. CXR revealed clear lung fileds. EKG showed RBBB and 1st degree AVB. No prior echo available  - C/w Lasix *** mg IV bid  - C/w home ACEI, BB, ARB, ALDORA, diltiazem, veraparmil, hydralazine, Imdur  - C/w supplemental O2  - Continue to measure strict I/O and daily weights  - obtain collateral from OP cardiology  - Obtain cardiology consult  - Repeat EKG in morning    Dispo:    Plan discussed w/ Dr Weller. Plan not final before attending attestation.     84M w/ PMH of HTN, HLD, CAD, CHF, CKD, GERD, anxiety/depression, brought to ED by family, for 1w history of leg swelling, redness and blisters found to have cellulitis, ED course c/b respiratory distress after IV fluids thought to be HF exacerbation improving after IV lasix. ICU consulted for tele monitoring iso HF exacerbation.     NEURO  Patient AOx3    #History of stroke  RUE residual weakness 2/5.  - cont to monitor    PULM  #AHRF - now resolved  Patient with transiently low O2 saturation on room air. Most likely secondary to fluid overload after IVF administration. CXR w/ clear lung fields. Now on RA.  - cont to monitor    CARDS  #?CHF exacerbation  Likely acute on chronic CHF in setting of IV fluid adiministration. On exam, chest clear to auscultation w/ b/l lower extremity edema to above knees, erythema and tenderness. Labs remarkable for BNP of 8.6k. CXR revealed clear lung fileds. EKG pending w/ 2021 study showing RBBB and 1st degree AVB. No prior echo available.   - obtain collateral from OP cardiology vs PC  - f/u EKG  - echo   - obtain med rec and cont w/ home diuretic dose  - C/w home GDMT w/ BP permitting  - C/w supplemental O2 as req  - Continue to measure I/O and daily weights  - Obtain cardiology consult if not improving     #CAD  Pt w/ hx of CAD (last cardiac cath 18yrs ago). EKG pending but NSR w/ RBBB and 1st degree AVB in 2021. Denies CP, SOB, palpitations.  - cobtain med rec and cont home statin and aspirin as appropriate    #HTN  Pt w/ hx of HTN. Previous home meds: metoprolol succinate 50mg BID, clonidine 0.1mg BID, norvasc 10mg qd, HCTZ 12.5mg qd.  - obtain med rec and restart medication as appropriate    Nutrition and GI  NAD    Renal    #CKD progression vs KALA on CKD  Pt w/ hx of chronic renal insufficiency (unknown baseline Cr but 1.4 in 2021). Cr 2.12 on this admission   - monitor Cr  - obtain collateral re baseline     ID    #b/l LE cellulitis vs more likely chronic venous stasis  b/l LE pain and worsened b/l LE swelling and erythema. nonpurulent. Received vanc/zosyn x1 in ED. Patient hospitalised in 2021 w/ similar presentation.   - cefazolin 2g IV q8h  - monitor cellulitis improvement while on antibiotics    Dispo: patient is stable for RMF    Plan discussed w/ Dr Weller. Plan not final before attending attestation.

## 2024-10-27 NOTE — ED ADULT NURSE NOTE - OBJECTIVE STATEMENT
Pt is an 85yo male PMHx HTN, BLE edema presenting to ED c/o leg swelling. Pt states, "My legs have become very swollen over the last 2 weeks, and I now have sores on both legs and drainage coming out of them." Pt noted to have +3 pitting edema in BLE, CMS intact bilaterally. Pt  is A&Ox4, breathing even and unlabored speaking in clear full sentences, ambulatory with walker with steady gait, no acute distress, c/o pain in BLE, denies lightheadedness, dizziness, n/v/d, h/a, c/p, sob, f/c, vital signs stable.

## 2024-10-27 NOTE — H&P ADULT - NSHPLABSRESULTS_GEN_ALL_CORE
LABS:                        13.0   13.82 )-----------( 125      ( 27 Oct 2024 12:33 )             41.4     10-27    139  |  97  |  38[H]  ----------------------------<  125[H]  4.6   |  32[H]  |  2.12[H]    Ca    8.8      27 Oct 2024 12:33        Urinalysis Basic - ( 27 Oct 2024 17:00 )    Color: Yellow / Appearance: Clear / S.015 / pH: x  Gluc: x / Ketone: Negative mg/dL  / Bili: Negative / Urobili: 1.0 mg/dL   Blood: x / Protein: 300 mg/dL / Nitrite: Negative   Leuk Esterase: Negative / RBC: 2 /HPF / WBC 0 /HPF   Sq Epi: x / Non Sq Epi: 0 /HPF / Bacteria: Negative /HPF        Lactate, Blood: 2.1 mmol/L *H* (10-27-24 @ 13:25)  Sedimentation Rate, Erythrocyte: 34 mm/Hr *H* (10-27-24 @ 12:33)  Lactate, Blood: 2.3 mmol/L *H* (10-27-24 @ 11:55)      CARDS:  no new      MICRO:    Urinalysis with Rflx Culture (collected 27 Oct 2024 17:00)        RADIOLOGY:  no new

## 2024-10-27 NOTE — ED PROVIDER NOTE - CARE PLAN
1 Principal Discharge DX:	Cellulitis  Secondary Diagnosis:	Congestive heart failure (CHF)  Secondary Diagnosis:	Chronic kidney disease (CKD)

## 2024-10-27 NOTE — H&P ADULT - HISTORY OF PRESENT ILLNESS
CRISTOBAL PORRAS is a 84y year old Male with a PMHx significant for ***.       ED COURSE  Vitals: T(C): 36.3 (10-27-24 @ 17:14), Max: 36.4 (10-27-24 @ 14:25); HR: 61 (10-27-24 @ 17:14) (61 - 74); BP: 148/94 (10-27-24 @ 17:14) (130/81 - 148/94); RR: 20 (10-27-24 @ 17:14) (18 - 22); SpO2: 96% (10-27-24 @ 17:14) (92% - 97%)  Labs: WBC 13.82 (89.5% neutrophils), lactate 2.3, 2.1 s/p 500cc, creatinine 2.12, .7, ESR 34, BNP 8629  EKG: normal sinus w/ left axis deviation, RBBB (seen on EKG 2021), LVH, T wave inversions in V4/V5/V6 (V5/V5 not seen in 2021)  Imaging: CXR wet read showing increased pulmonary vasculature but not pleural effusions or consolidations  Interventions: 500cc 0.9% NS, 20mg lasix IV , 1000mg vanc , 3.375g zosyn  Consults: ICU team for likely plash pulmonary edema 2/2 500cc fluid bolus   CRISTOBAL PORRAS is a 84y year old Male with a PMHx significant for HTN, HLD, CAD (last PCI 18y ago), CHF (no echo on file), CKD (Cr 1.4 in 9/21), GERD, stroke (residual RUE weakness), anxiety/depression, brought to ED by girlfriend Tanya, for 2 day history of progressive leg swelling, redness and blisters.      ED COURSE  Vitals: T(C): 36.3 (10-27-24 @ 17:14), Max: 36.4 (10-27-24 @ 14:25); HR: 61 (10-27-24 @ 17:14) (61 - 74); BP: 148/94 (10-27-24 @ 17:14) (130/81 - 148/94); RR: 20 (10-27-24 @ 17:14) (18 - 22); SpO2: 96% (10-27-24 @ 17:14) (92% - 97%)  Labs: WBC 13.82 (89.5% neutrophils), lactate 2.3, 2.1 s/p 500cc, creatinine 2.12, .7, ESR 34, BNP 8629  EKG: normal sinus w/ left axis deviation, RBBB (seen on EKG 2021), LVH, T wave inversions in V4/V5/V6 (V4/V5 not seen in 2021)  Imaging: CXR wet read showing increased pulmonary vasculature but not pleural effusions or consolidations  Interventions: 500cc 0.9% NS, 20mg lasix IV , 1000mg vanc , 3.375g zosyn  Consults: ICU team for likely plash pulmonary edema 2/2 500cc fluid bolus   CRISTOBAL PORRAS is a 84y year old Male with a PMHx significant for HTN, HLD, CAD (last PCI 18y ago), CHF (no echo on file), CKD (Cr 1.4 in 9/21), GERD, stroke (residual RUE weakness), anxiety/depression, brought to ED by girlfriend Tanya, for leg swelling, redness, and oozing. Patient reports that his leg swelling has been on-going for approximately 3 weeks. Has gotten worse in the last 3 days, to the point he felt he needed to come in. Was dropped off by his girlfriend Tanya Lucas, however, she was not at bedside to provide further collateral. Patient has an extensive history of smoking approximately 70 years. Denies any similar episodes to this, although upon chart review, patient was admitted for bilateral lower extremity swelling and left leg non-purulent cellulitis in 9/2021. Denies any history of heart problems, fevers, chills, abdominal pain, chest pain. Endorses shortness of breath with exertion, orthopnea.     Patient is unsure exactly what medications he takes at home but when shown a list of medications from [employer], he endorses that he takes these medications. Patient does not remember the name of his pharmacy - pharmacy's in sure script show [facility] on [address] & [facility] [address] Per [employer], his PCP appears to be [facility].     ED COURSE  Vitals: T(C): 36.3 (10-27-24 @ 17:14), Max: 36.4 (10-27-24 @ 14:25); HR: 61 (10-27-24 @ 17:14) (61 - 74); BP: 148/94 (10-27-24 @ 17:14) (130/81 - 148/94); RR: 20 (10-27-24 @ 17:14) (18 - 22); SpO2: 96% (10-27-24 @ 17:14) (92% - 97%)  Labs: WBC 13.82 (89.5% neutrophils), lactate 2.3, 2.1 s/p 500cc, creatinine 2.12, .7, ESR 34, BNP 8629, trop 76  EKG: normal sinus w/ left axis deviation, RBBB (seen on EKG 2021), LVH, T wave inversions in V4/V5/V6 (V4/V5 not seen in 2021)  Imaging: CXR wet read showing increased pulmonary vasculature but not pleural effusions or consolidations  Interventions: 500cc 0.9% NS, 20mg lasix IV , 1000mg vanc , 3.375g zosyn  Consults: ICU team for likely plash pulmonary edema 2/2 500cc fluid bolus   CRISTOBAL PORRAS is a 84y year old Male with a PMHx significant for HTN, HLD, CAD (last PCI 18y ago), CHF (no echo on file), CKD (Cr 1.4 in 9/21), GERD, stroke (residual RUE weakness), anxiety/depression, brought to ED by girlfriend Tanya, for leg swelling, redness, and oozing. Patient reports that his leg swelling has been on-going for approximately 3 weeks. Has gotten worse in the last 3 days, to the point he felt he needed to come in. Was dropped off by his girlfriend Tanya Lucas, however, she was not at bedside to provide further collateral. Patient has an extensive history of smoking approximately 70 years. Denies any similar episodes to this, although upon chart review, patient was admitted for bilateral lower extremity swelling and left leg non-purulent cellulitis in 9/2021. Denies any history of heart problems, fevers, chills, abdominal pain, chest pain. Endorses shortness of breath with exertion, orthopnea, leg.     Patient is unsure exactly what medications he takes at home but when shown a list of medications from [employer], he endorses that he takes these medications. Patient does not remember the name of his pharmacy - pharmacy's in sure script show [facility] on [address] & [facility] [address] Per [employer], his PCP appears to be Stone Allen.     ED COURSE  Vitals: T(C): 36.3 (10-27-24 @ 17:14), Max: 36.4 (10-27-24 @ 14:25); HR: 61 (10-27-24 @ 17:14) (61 - 74); BP: 148/94 (10-27-24 @ 17:14) (130/81 - 148/94); RR: 20 (10-27-24 @ 17:14) (18 - 22); SpO2: 96% (10-27-24 @ 17:14) (92% - 97%)  Labs: WBC 13.82 (89.5% neutrophils), lactate 2.3, 2.1 s/p 500cc, creatinine 2.12, .7, ESR 34, BNP 8629, trop 76  EKG: normal sinus w/ left axis deviation, RBBB (seen on EKG 2021), LVH, T wave inversions in V4/V5/V6 (V4/V5 not seen in 2021)  Imaging: CXR wet read showing increased pulmonary vasculature but not pleural effusions or consolidations  Interventions: 500cc 0.9% NS, 20mg lasix IV , 1000mg vanc , 3.375g zosyn  Consults: ICU team for likely plash pulmonary edema 2/2 500cc fluid bolus

## 2024-10-27 NOTE — H&P ADULT - ATTENDING COMMENTS
84y year old Male with a PMHx significant for CHF, HTN, HLD, CAD, CKD, GERD, anxiety/depression, brought to ED by family, for 3 week history of leg swelling, redness and blistering that has worsened in the last 3 days found to have cellulitis, ED course c/b respiratory distress after IV fluids thought to be HF exacerbation improving after IV lasix. Admitted to medicine team for further management of AHRF, HF exacerbation and non-purulent cellulitis.    Pt is seen at bedside. in NAD, resting comfortably on 2L NC. endorses b/l LE pain, mild. states SOB has now resolved. denies fevers/chills, no CP, abd pain, NV. ROS +orthopnea, and LE edema. Exam- elderly male in NAD, AO x3, following commands. MS 5/5 LUE, 2/5 RUE.  MMM, +JVD, L basilar crackles, no wheezing, abd soft, NT/ND. b/l LE +2 pitting edema up to knee. mildly erythematous, TTP, LLE w/ ant tibia ulcer, no purulent drainage. WWP on exam     #Acute on chronic CHF exacerbation   #Cellulitis   #KALA on CKD vs CKD progression   #CAD  #PEDRITO    Plan   -monitor urine ouput, strict I/Os   -wean O2 as tolerated   -c/w Lasix 20mg IVP QD   -cards consult, TTE   -trend trops to peak   -c/w cefazolin   -f/up blood cx  -trend cr, f/up urine lytes   -fall precautions 84y year old Male with a PMHx significant for CHF, HTN, HLD, CAD, CKD, GERD, anxiety/depression, brought to ED by family, for 3 week history of leg swelling, redness and blistering that has worsened in the last 3 days found to have cellulitis, ED course c/b respiratory distress after IV fluids thought to be HF exacerbation improving after IV lasix. Admitted to medicine team for further management of AHRF, HF exacerbation and non-purulent cellulitis.    Pt is seen at bedside. in NAD, resting comfortably on 2L NC. endorses b/l LE pain, mild. states SOB has now resolved. denies fevers/chills, no CP, abd pain, NV. ROS +orthopnea, and LE edema. Exam- elderly male in NAD, AO x3, following commands. MS 5/5 LUE, 2/5 RUE.  MMM, +JVD, L basilar crackles, no wheezing, abd soft, NT/ND. b/l LE +2 pitting edema up to knee. mildly erythematous, TTP, LLE w/ ant tibia ulcer, no purulent drainage. WWP on exam     #Acute on chronic CHF exacerbation   #Cellulitis   #KALA on CKD vs CKD progression   #CAD  #PEDRITO    Plan   -monitor urine ouput, strict I/Os   -wean O2 as tolerated   -c/w Lasix 20mg IVP QD   -cards consult, TTE   -trend trops to peak   -c/w cefazolin, f/up blood cx, LE dopplers  -trend cr, f/up urine lytes   -fall precautions

## 2024-10-27 NOTE — ED ADULT TRIAGE NOTE - CHIEF COMPLAINT QUOTE
" my legs are swollen." LE swelling X 2 weeks becoming worst. + redness + yellowish oozing w open sores. denies fevers

## 2024-10-27 NOTE — H&P ADULT - NSHPPHYSICALEXAM_GEN_ALL_CORE
PHYSICAL EXAM  General: well appearing, in no acute distress    HEENT: NC/AT, sclera anicteric, conjunctiva clear, mucus membranes moist, no lymphadenopathy, no JVD appreciated   Lungs: anterior and posterior lung fields clear to auscultation bilaterally, no wheezes, rhonchi or rales   Heart: regular rate and rhythm, normal S1 S2, no murmurs/rubs/gallops    Abdomen: soft, non-tender, non-distended, bowel sounds present   Extremities: atraumatic, no lower extremity edema, clubbing or cyanosis, distal pulses 2+ at DP and radial bilaterally    Skin: normal skin texture and turgor without rashes or lesions, no diaphoresis   Neuro: A&Ox3, clear speech, facial features symmetrical, moving all extremities spontaneously, strength grossly intact, sensation intact to light touch at distal upper and lower extremities bilaterally PHYSICAL EXAM  General: well appearing, in no acute distress, mumbled voice  HEENT: NC/AT, sclera anicteric, conjunctiva clear, mucus membranes moist  Lungs: anterior and posterior lung fields clear to auscultation bilaterally, no wheezes or crackles  Heart: regular rate and rhythm, normal S1 S2, no murmurs/rubs/gallops    Abdomen: soft, non-tender, non-distended  Extremities: bilateral lower extremity 2+ edema  to the shins w/ erythema and ulcerations, no purulent drainage noted  Skin: normal skin texture and turgor without rashes or lesions, no diaphoresis   Neuro: A&Ox3, mumbled speech, facial features symmetrical, moving all extremities spontaneously, strength grossly intact, sensation intact to light touch at distal upper and lower extremities bilaterally

## 2024-10-27 NOTE — ED PROVIDER NOTE - MUSCULOSKELETAL, MLM
Spine appears normal, range of motion is not limited, no muscle or joint tenderness; LE: 3+ pitting edema b/l, + erythema b/l mid shin down to ankle, + warmth b/l, + open blisters b/l, FROM b/l - ambulatory w/ walker (baseline) w/o assistance

## 2024-10-27 NOTE — ED ADULT NURSE NOTE - NSICDXPASTMEDICALHX_GEN_ALL_CORE_FT
PAST MEDICAL HISTORY:  Anxiety     Arthritis     CAD (coronary artery disease)     CAD (coronary artery disease)     Cellulitis, leg     Chronic CHF     Chronic renal insufficiency     Chronic renal insufficiency     Chronic systolic congestive heart failure     Constipation     Constipation     Depression     Depression     GERD (gastroesophageal reflux disease)     GERD (gastroesophageal reflux disease)     History of BPH     HLD (hyperlipidemia)     HTN (hypertension)     HTN (hypertension)

## 2024-10-27 NOTE — H&P ADULT - PROBLEM SELECTOR PLAN 10
-c/w home Home doxepin 10mg QD; no therapeutic interchange for this here  - Hold home doxepin 10mg      - Per pharmacy, no withdrawal symptoms if short term

## 2024-10-27 NOTE — ED PROVIDER NOTE - ATTENDING APP SHARED VISIT CONTRIBUTION OF CARE
Pt is an 84yo m, h/o htn, hld, cad, chf, ckd, gerd, anxiety/ depression, bib family for b/l leg swelling, redness and oozing x 1 wk. Pt is poor historian. No fever, chills, cp, sob, cough... Afebrile. HDS. PE as above. + b/l lower ext edema, venous stasis changes, few open blisters b/l, erythema and warmth. Compartments soft. Normal ROM. No crepitus. Ambulatory with rollator. Pulses intact and equal b/l. + s1, s2, rrr. Lungs cta b/l. Impression is leg cellulitis w/ no concern for nec fasc. Wbc 13.82, lactate 2.1. Pt given abx and gentle fluid bolus after which pt became mildly tachypneic and hypoxic. Pt then given lasix w/ appropriate diuresis and improvement. No resp distress. MICU consult requested for evaluation. Dispo pending.

## 2024-10-27 NOTE — H&P ADULT - PROBLEM SELECTOR PLAN 2
Likely acute on chronic CHF in setting of IV fluid adiministration. On exam, chest clear to auscultation w/ b/l lower extremity edema to above knees, erythema and tenderness. Labs remarkable for BNP of 8.6k. CXR revealed clear lung fields. EKG showing previous RBBB, new T wave inversions in V4/V5 compared to 2021. No previous echo available.   - f/u TTE  - f/u trop  - f/u CKMB  - c/w home medications:  - Wean O2 as tolerated above Likely acute on chronic CHF in setting of IV fluid administration. On exam, chest clear to auscultation w/ b/l lower extremity edema to above knees, erythema and tenderness. Labs remarkable for BNP of 8.6k, Trop elevated to 76, normal CKMB. CXR revealed clear lung fields. EKG showing previous RBBB, new T wave inversions in V4/V5 compared to 2021. No previous echo available.   - f/u TTE  - c/w home medications:  - Wean O2 as tolerated above  - trend trop Patient with a transiently low O2 saturation on room air after receiving 500cc bolus of normal saline in the ED. In the setting of bilateral lower extremity edema and a likely hx of CHF. Patient also reports 70 pack years of smoking. No wheezes or crackles on exam for admission. Most likely flash pulmonary edema in the setting of heart failure.    - S/p 20mg IV lasix w/ improvement in hypoxemia  - ICU team consulted for flash pulmonary edema  - Wean O2 as tolerated Patient with a transiently low O2 saturation on room air after receiving 500cc bolus of normal saline in the ED. In the setting of bilateral lower extremity edema and a likely hx of CHF. Patient also reports 70 pack years of smoking. No wheezes or crackles on exam for admission. Most likely flash pulmonary edema in the setting of heart failure.    - S/p 20mg IV lasix w/ improvement in hypoxemia  - ICU team consulted for flash pulmonary edema  - c/w diuresis below  - strict I/Os   - Wean O2 as tolerated

## 2024-10-27 NOTE — H&P ADULT - PROBLEM SELECTOR PLAN 12
Fluids: s/p 500cc NS bolus w/ flash pulm edema; caution w/ fluids  Electrolytes: replete as needed  Nutrition: DASH diet  PPI ppx: pantoprazole 40mg QD  Dvt ppx: lovenox 30mg (renally dosed)   Activity: as tolerated

## 2024-10-27 NOTE — H&P ADULT - NSHPSOCIALHISTORY_GEN_ALL_CORE
Lives with girlfriend Tanya Lucas. Walks with a walker at home. Smokes cigarettes and drinks alcohol.

## 2024-10-27 NOTE — ED PROVIDER NOTE - CLINICAL SUMMARY MEDICAL DECISION MAKING FREE TEXT BOX
pt c/o leg redness/swelling x wk, very poor historian, upon chart review - PMH HTN/HLD/CHF/CAD/CKD (last cr 1.4 from 9/21), on exam + cellulitis of LE, pt afebrile, hemodynamically stable, + pedal edema b/l and venous stasis changes, found to have leukocytosis, elevated lactate, worsening ckd, elevated pro bnp. blood cultures sent, abx given, since ? EF and hx CHF - very gentle ivf given, lactate improved however pt noted to be tachypneic and pulse ox down to 92% - given dose of lasix and tachypnea / hypoxia resolved, micu consulted for eval and tele setting given ? EF/card status and need for con't iv abx - pt easily fluid overloaded and will require close monitoring pt c/o leg redness/swelling x wk, very poor historian, upon chart review - PMH HTN/HLD/CHF/CAD/CKD (last cr 1.4 from 9/21), on exam + cellulitis of LE, pt afebrile, hemodynamically stable, + pedal edema b/l and venous stasis changes, found to have leukocytosis, elevated lactate, worsening ckd, elevated pro bnp. blood cultures sent, abx given, since ? EF and hx CHF - very gentle ivf given, lactate improved however pt noted to be tachypneic and pulse ox down to 92% - given dose of lasix and tachypnea / hypoxia resolved, micu consulted for eval and tele setting given ? EF/card status and need for con't iv abx - pt easily fluid overloaded and will require close monitoring but deemed stable for RMF

## 2024-10-27 NOTE — H&P ADULT - PROBLEM SELECTOR PLAN 7
- continue w/ - continue w/ metoprolol 50mg QD (home surescripts says BID)  - hold home fosinopril 40mg given KALA  - hold clonidine 0.1mg given normal BPs & cannot confirm BP medication yet  - Holding HCTZ 12.5mg QD given KALA - continue w/ metoprolol 50mg BID  - clonidine 0.1mg BID  - hold home fosinopril 40mg given KALA  - Holding HCTZ 12.5mg QD given KALA

## 2024-10-27 NOTE — H&P ADULT - PROBLEM SELECTOR PLAN 4
#CKD progression vs KALA on CKD  Pt w/ hx of chronic renal insufficiency (unknown baseline Cr but 1.4 in 2021). Cr 2.12 on this admission. Likely pre-renal in the setting of third spacing w/ potential CHF exacerbation.   - monitor Cr  - Caution w/ fluids given likely flash pulmonary edema s/p 500cc #CKD progression vs KALA on CKD  Pt w/ hx of chronic renal insufficiency (unknown baseline Cr but 1.4 in 2021). Cr 2.12 on this admission. Likely pre-renal in the setting of  likely CHF exacerbation.   - monitor Cr  - Caution w/ fluids given likely flash pulmonary edema s/p 500cc  - renally dose medication:       -  lovenox 30mg for DVT ppx       - hold home gabapentin 600mg until confirm w/ med rec & then renally dose  - lasix 20mg IV QD #CKD progression vs KALA on CKD  Pt w/ hx of chronic renal insufficiency (unknown baseline Cr but 1.4 in 2021). Cr 2.12 on this admission. Creatinine clearance 28 on admission. KALA on CKD likely pre-renal in the setting of  likely CHF exacerbation.   - monitor Cr  - Caution w/ fluids given likely flash pulmonary edema s/p 500cc  - renally dose medication:       -  lovenox 30mg for DVT ppx       - hold home gabapentin 600mg until confirm w/ med rec & then renally dose       - cefazolin 1g IV Q12H  - lasix 20mg IV QD #CKD progression vs KALA on CKD  Pt w/ hx of chronic renal insufficiency (unknown baseline Cr but 1.4 in 2021). Cr 2.12 on this admission. Creatinine clearance 28 on admission. KALA on CKD likely pre-renal in the setting of  likely CHF exacerbation.   - monitor Cr  - Caution w/ fluids given likely flash pulmonary edema s/p 500cc  - renally dose medication:       -  lovenox 30mg for DVT ppx       - hold home gabapentin 600mg until confirm w/ med rec & then renally dose       - cefazolin 1g IV Q12H  - f/u bladder scan to R/O obstruction  - f/u renal US  - lasix 20mg IV QD #CKD progression vs KALA on CKD  Pt w/ hx of chronic renal insufficiency (unknown baseline Cr but 1.4 in 2021). Cr 2.12 on this admission. Creatinine clearance 28 on admission. KALA on CKD likely pre-renal in the setting of  likely CHF exacerbation.   - monitor Cr. f/up urine lytes   - Caution w/ fluids given likely flash pulmonary edema s/p 500cc  - renally dose medication:       -  lovenox 30mg for DVT ppx       - hold home gabapentin 600mg until confirm w/ med rec & then renally dose       - cefazolin 1g IV Q12H  - f/u bladder scan to R/O obstruction  - f/u renal US

## 2024-10-27 NOTE — ED ADULT NURSE NOTE - NS ED NURSE LEVEL OF CONSCIOUSNESS ORIENTATION
You can access the FollowMyHealth Patient Portal offered by [facility] by registering at the following website: [email or web address]. By joining [facility]’s FollowMyHealth portal, you will also be able to view your health information using other applications (apps) compatible with our system.
Oriented - self; Oriented - place; Oriented - time

## 2024-10-27 NOTE — H&P ADULT - PROBLEM SELECTOR PLAN 5
New T-wave inversions noted on EKG today on V4/V5 compared to 2021.   - See CHF plan above EKG w/ known RBBB/LAFB. New T-wave inversions noted on EKG today on V4/V5 compared to 2021. Denies CP. trops mildly elevated i/s/o darleen. CK/CKMB wnl.   - trend trops   - repeat EKG  - See CHF plan above

## 2024-10-28 DIAGNOSIS — L03.115 CELLULITIS OF RIGHT LOWER LIMB: ICD-10-CM

## 2024-10-28 LAB
-  STAPHYLOCOCCUS EPIDERMIDIS, METHICILLIN RESISTANT: SIGNIFICANT CHANGE UP
A1C WITH ESTIMATED AVERAGE GLUCOSE RESULT: 5.7 % — HIGH (ref 4–5.6)
ADD ON TEST-SPECIMEN IN LAB: SIGNIFICANT CHANGE UP
ADD ON TEST-SPECIMEN IN LAB: SIGNIFICANT CHANGE UP
AMORPH SED URNS QL MICRO: PRESENT
AMPHET UR-MCNC: NEGATIVE — SIGNIFICANT CHANGE UP
ANION GAP SERPL CALC-SCNC: 6 MMOL/L — SIGNIFICANT CHANGE UP (ref 5–17)
ANISOCYTOSIS BLD QL: SLIGHT — SIGNIFICANT CHANGE UP
APPEARANCE UR: CLEAR — SIGNIFICANT CHANGE UP
BACTERIA # UR AUTO: NEGATIVE /HPF — SIGNIFICANT CHANGE UP
BARBITURATES UR SCN-MCNC: NEGATIVE — SIGNIFICANT CHANGE UP
BASOPHILS # BLD AUTO: 0 K/UL — SIGNIFICANT CHANGE UP (ref 0–0.2)
BASOPHILS NFR BLD AUTO: 0 % — SIGNIFICANT CHANGE UP (ref 0–2)
BENZODIAZ UR-MCNC: NEGATIVE — SIGNIFICANT CHANGE UP
BILIRUB UR-MCNC: NEGATIVE — SIGNIFICANT CHANGE UP
BUN SERPL-MCNC: 39 MG/DL — HIGH (ref 7–23)
BURR CELLS BLD QL SMEAR: PRESENT — SIGNIFICANT CHANGE UP
CALCIUM SERPL-MCNC: 8.8 MG/DL — SIGNIFICANT CHANGE UP (ref 8.4–10.5)
CAST: 16 /LPF — HIGH (ref 0–4)
CHLORIDE SERPL-SCNC: 101 MMOL/L — SIGNIFICANT CHANGE UP (ref 96–108)
CHOLEST SERPL-MCNC: 101 MG/DL — SIGNIFICANT CHANGE UP
CO2 SERPL-SCNC: 37 MMOL/L — HIGH (ref 22–31)
COCAINE METAB.OTHER UR-MCNC: NEGATIVE — SIGNIFICANT CHANGE UP
COLOR SPEC: YELLOW — SIGNIFICANT CHANGE UP
CREAT ?TM UR-MCNC: 112 MG/DL — SIGNIFICANT CHANGE UP
CREAT SERPL-MCNC: 2.11 MG/DL — HIGH (ref 0.5–1.3)
DIFF PNL FLD: NEGATIVE — SIGNIFICANT CHANGE UP
EGFR: 30 ML/MIN/1.73M2 — LOW
EOSINOPHIL # BLD AUTO: 0 K/UL — SIGNIFICANT CHANGE UP (ref 0–0.5)
EOSINOPHIL NFR BLD AUTO: 0 % — SIGNIFICANT CHANGE UP (ref 0–6)
ESTIMATED AVERAGE GLUCOSE: 117 MG/DL — HIGH (ref 68–114)
FENTANYL UR QL SCN: NEGATIVE — SIGNIFICANT CHANGE UP
FINE GRAN CASTS #/AREA URNS AUTO: PRESENT
GIANT PLATELETS BLD QL SMEAR: PRESENT — SIGNIFICANT CHANGE UP
GLUCOSE SERPL-MCNC: 76 MG/DL — SIGNIFICANT CHANGE UP (ref 70–99)
GLUCOSE UR QL: NEGATIVE MG/DL — SIGNIFICANT CHANGE UP
GRAM STN FLD: ABNORMAL
HCT VFR BLD CALC: 41.1 % — SIGNIFICANT CHANGE UP (ref 39–50)
HDLC SERPL-MCNC: 56 MG/DL — SIGNIFICANT CHANGE UP
HGB BLD-MCNC: 13 G/DL — SIGNIFICANT CHANGE UP (ref 13–17)
KETONES UR-MCNC: NEGATIVE MG/DL — SIGNIFICANT CHANGE UP
LEUKOCYTE ESTERASE UR-ACNC: NEGATIVE — SIGNIFICANT CHANGE UP
LIPID PNL WITH DIRECT LDL SERPL: 30 MG/DL — SIGNIFICANT CHANGE UP
LYMPHOCYTES # BLD AUTO: 0.33 K/UL — LOW (ref 1–3.3)
LYMPHOCYTES # BLD AUTO: 3.6 % — LOW (ref 13–44)
MACROCYTES BLD QL: SLIGHT — SIGNIFICANT CHANGE UP
MAGNESIUM SERPL-MCNC: 1.8 MG/DL — SIGNIFICANT CHANGE UP (ref 1.6–2.6)
MANUAL SMEAR VERIFICATION: SIGNIFICANT CHANGE UP
MCHC RBC-ENTMCNC: 31.4 PG — SIGNIFICANT CHANGE UP (ref 27–34)
MCHC RBC-ENTMCNC: 31.6 GM/DL — LOW (ref 32–36)
MCV RBC AUTO: 99.3 FL — SIGNIFICANT CHANGE UP (ref 80–100)
METHADONE UR-MCNC: NEGATIVE — SIGNIFICANT CHANGE UP
METHOD TYPE: SIGNIFICANT CHANGE UP
MICROCYTES BLD QL: SLIGHT — SIGNIFICANT CHANGE UP
MONOCYTES # BLD AUTO: 0.33 K/UL — SIGNIFICANT CHANGE UP (ref 0–0.9)
MONOCYTES NFR BLD AUTO: 3.6 % — SIGNIFICANT CHANGE UP (ref 2–14)
NEUTROPHILS # BLD AUTO: 8.57 K/UL — HIGH (ref 1.8–7.4)
NEUTROPHILS NFR BLD AUTO: 92.8 % — HIGH (ref 43–77)
NITRITE UR-MCNC: NEGATIVE — SIGNIFICANT CHANGE UP
NON HDL CHOLESTEROL: 45 MG/DL — SIGNIFICANT CHANGE UP
OPIATES UR-MCNC: NEGATIVE — SIGNIFICANT CHANGE UP
OSMOLALITY UR: 478 MOSM/KG — SIGNIFICANT CHANGE UP (ref 300–900)
OVALOCYTES BLD QL SMEAR: SIGNIFICANT CHANGE UP
PCP SPEC-MCNC: SIGNIFICANT CHANGE UP
PCP UR-MCNC: NEGATIVE — SIGNIFICANT CHANGE UP
PH UR: 5.5 — SIGNIFICANT CHANGE UP (ref 5–8)
PHOSPHATE SERPL-MCNC: 4.8 MG/DL — HIGH (ref 2.5–4.5)
PLAT MORPH BLD: ABNORMAL
PLATELET # BLD AUTO: 120 K/UL — LOW (ref 150–400)
POIKILOCYTOSIS BLD QL AUTO: SIGNIFICANT CHANGE UP
POLYCHROMASIA BLD QL SMEAR: SLIGHT — SIGNIFICANT CHANGE UP
POTASSIUM SERPL-MCNC: 4.2 MMOL/L — SIGNIFICANT CHANGE UP (ref 3.5–5.3)
POTASSIUM SERPL-SCNC: 4.2 MMOL/L — SIGNIFICANT CHANGE UP (ref 3.5–5.3)
POTASSIUM UR-SCNC: 56 MMOL/L — SIGNIFICANT CHANGE UP
PROT ?TM UR-MCNC: 178 MG/DL — HIGH (ref 0–12)
PROT UR-MCNC: 300 MG/DL
PROT/CREAT UR-RTO: 1.6 RATIO — HIGH (ref 0–0.2)
RBC # BLD: 4.14 M/UL — LOW (ref 4.2–5.8)
RBC # FLD: 14.6 % — HIGH (ref 10.3–14.5)
RBC BLD AUTO: ABNORMAL
RBC CASTS # UR COMP ASSIST: 3 /HPF — SIGNIFICANT CHANGE UP (ref 0–4)
SCHISTOCYTES BLD QL AUTO: SLIGHT — SIGNIFICANT CHANGE UP
SODIUM SERPL-SCNC: 144 MMOL/L — SIGNIFICANT CHANGE UP (ref 135–145)
SODIUM UR-SCNC: 35 MMOL/L — SIGNIFICANT CHANGE UP
SP GR SPEC: 1.02 — SIGNIFICANT CHANGE UP (ref 1–1.03)
SPECIMEN SOURCE: SIGNIFICANT CHANGE UP
SPECIMEN SOURCE: SIGNIFICANT CHANGE UP
SQUAMOUS # UR AUTO: 2 /HPF — SIGNIFICANT CHANGE UP (ref 0–5)
THC UR QL: POSITIVE
TRIGL SERPL-MCNC: 68 MG/DL — SIGNIFICANT CHANGE UP
TROPONIN T, HIGH SENSITIVITY RESULT: 77 NG/L — CRITICAL HIGH (ref 0–51)
TROPONIN T, HIGH SENSITIVITY RESULT: 79 NG/L — CRITICAL HIGH (ref 0–51)
TSH SERPL-MCNC: 2.88 UIU/ML — SIGNIFICANT CHANGE UP (ref 0.27–4.2)
UROBILINOGEN FLD QL: 0.2 MG/DL — SIGNIFICANT CHANGE UP (ref 0.2–1)
UUN UR-MCNC: 698 MG/DL — SIGNIFICANT CHANGE UP
WBC # BLD: 9.24 K/UL — SIGNIFICANT CHANGE UP (ref 3.8–10.5)
WBC # FLD AUTO: 9.24 K/UL — SIGNIFICANT CHANGE UP (ref 3.8–10.5)
WBC UR QL: 2 /HPF — SIGNIFICANT CHANGE UP (ref 0–5)

## 2024-10-28 PROCEDURE — 99221 1ST HOSP IP/OBS SF/LOW 40: CPT

## 2024-10-28 PROCEDURE — 99233 SBSQ HOSP IP/OBS HIGH 50: CPT

## 2024-10-28 RX ORDER — ASPIRIN/MAG CARB/ALUMINUM AMIN 325 MG
81 TABLET ORAL DAILY
Refills: 0 | Status: DISCONTINUED | OUTPATIENT
Start: 2024-10-28 | End: 2024-11-01

## 2024-10-28 RX ORDER — FUROSEMIDE 40 MG
40 TABLET ORAL EVERY 12 HOURS
Refills: 0 | Status: DISCONTINUED | OUTPATIENT
Start: 2024-10-28 | End: 2024-10-29

## 2024-10-28 RX ORDER — FUROSEMIDE 40 MG
20 TABLET ORAL ONCE
Refills: 0 | Status: COMPLETED | OUTPATIENT
Start: 2024-10-28 | End: 2024-10-28

## 2024-10-28 RX ORDER — METOPROLOL TARTRATE 50 MG
50 TABLET ORAL DAILY
Refills: 0 | Status: DISCONTINUED | OUTPATIENT
Start: 2024-10-29 | End: 2024-10-29

## 2024-10-28 RX ORDER — VANCOMYCIN HYDROCHLORIDE 50 MG/ML
1000 KIT ORAL EVERY 24 HOURS
Refills: 0 | Status: DISCONTINUED | OUTPATIENT
Start: 2024-10-28 | End: 2024-10-29

## 2024-10-28 RX ADMIN — Medication 30 MILLIGRAM(S): at 10:26

## 2024-10-28 RX ADMIN — Medication 40 MILLIGRAM(S): at 21:35

## 2024-10-28 RX ADMIN — Medication 40 MILLIGRAM(S): at 19:53

## 2024-10-28 RX ADMIN — CLONIDINE HYDROCHLORIDE 0.1 MILLIGRAM(S): 0.2 TABLET ORAL at 18:50

## 2024-10-28 RX ADMIN — Medication 81 MILLIGRAM(S): at 12:50

## 2024-10-28 RX ADMIN — PANTOPRAZOLE SODIUM 40 MILLIGRAM(S): 40 TABLET, DELAYED RELEASE ORAL at 06:52

## 2024-10-28 RX ADMIN — Medication 50 MILLIGRAM(S): at 06:52

## 2024-10-28 RX ADMIN — VANCOMYCIN HYDROCHLORIDE 250 MILLIGRAM(S): KIT at 17:03

## 2024-10-28 RX ADMIN — Medication 1 TABLET(S): at 10:26

## 2024-10-28 RX ADMIN — CLONIDINE HYDROCHLORIDE 0.1 MILLIGRAM(S): 0.2 TABLET ORAL at 06:52

## 2024-10-28 RX ADMIN — Medication 100 MILLIGRAM(S): at 10:26

## 2024-10-28 RX ADMIN — Medication 20 MILLIGRAM(S): at 12:50

## 2024-10-28 NOTE — PROGRESS NOTE ADULT - SUBJECTIVE AND OBJECTIVE BOX
SUBJECTIVE:  CRISTOBAL PORRAS is doing well this morning. Today is hospital day 1d.     24 Hour Events:   - No acute overnight events.    ALLERGIES:  No Known Allergies    MEDICATIONS:  STANDING MEDICATIONS  atorvastatin 40 milliGRAM(s) Oral at bedtime  ceFAZolin   IVPB 1000 milliGRAM(s) IV Intermittent every 12 hours  cloNIDine 0.1 milliGRAM(s) Oral two times a day  enoxaparin Injectable 30 milliGRAM(s) SubCutaneous every 24 hours  furosemide   Injectable 20 milliGRAM(s) IV Push daily  metoprolol succinate ER 50 milliGRAM(s) Oral every 12 hours  multivitamin 1 Tablet(s) Oral daily  pantoprazole    Tablet 40 milliGRAM(s) Oral before breakfast    PRN MEDICATIONS  acetaminophen     Tablet .. 650 milliGRAM(s) Oral every 6 hours PRN  ondansetron Injectable 4 milliGRAM(s) IV Push every 8 hours PRN    VITALS:   ICU Vital Signs Last 24 Hrs  T(C): 36.3 (28 Oct 2024 06:34), Max: 37 (27 Oct 2024 20:53)  T(F): 97.4 (28 Oct 2024 06:34), Max: 98.6 (27 Oct 2024 20:53)  HR: 71 (28 Oct 2024 06:34) (61 - 78)  BP: 122/65 (28 Oct 2024 06:34) (122/65 - 155/80)  BP(mean): --  ABP: --  ABP(mean): --  RR: 16 (28 Oct 2024 06:34) (16 - 22)  SpO2: 95% (28 Oct 2024 06:34) (92% - 97%)    O2 Parameters below as of 28 Oct 2024 06:34  Patient On (Oxygen Delivery Method): nasal cannula            PHYSICAL EXAM  General: well appearing, in no acute distress    HEENT: NC/AT, sclera anicteric, conjunctiva clear, mucus membranes moist, no lymphadenopathy, no JVD appreciated   Lungs: anterior and posterior lung fields clear to auscultation bilaterally, no wheezes, rhonchi or rales   Heart: regular rate and rhythm, normal S1 S2, no murmurs/rubs/gallops    Abdomen: soft, non-tender, non-distended, bowel sounds present   Extremities: atraumatic, no lower extremity edema, clubbing or cyanosis, distal pulses 2+ at DP and radial bilaterally    Skin: normal skin texture and turgor without rashes or lesions, no diaphoresis   Neuro: A&Ox3, clear speech, facial features symmetrical, moving all extremities spontaneously, strength grossly intact, sensation intact to light touch at distal upper and lower extremities bilaterally     LABS:                        13.0   13.82 )-----------( 125      ( 27 Oct 2024 12:33 )             41.4     10-27    139  |  97  |  38[H]  ----------------------------<  125[H]  4.6   |  32[H]  |  2.12[H]    Ca    8.8      27 Oct 2024 12:33    TPro  7.3  /  Alb  3.3  /  TBili  1.0  /  DBili  0.2  /  AST  34  /  ALT  17  /  AlkPhos  74  10-27      Urinalysis Basic - ( 27 Oct 2024 17:00 )    Color: Yellow / Appearance: Clear / S.015 / pH: x  Gluc: x / Ketone: Negative mg/dL  / Bili: Negative / Urobili: 1.0 mg/dL   Blood: x / Protein: 300 mg/dL / Nitrite: Negative   Leuk Esterase: Negative / RBC: 2 /HPF / WBC 0 /HPF   Sq Epi: x / Non Sq Epi: 0 /HPF / Bacteria: Negative /HPF        Lactate, Blood: 2.1 mmol/L *H* (10-27-24 @ 13:25)  Sedimentation Rate, Erythrocyte: 34 mm/Hr *H* (10-27-24 @ 12:33)  Lactate, Blood: 2.3 mmol/L *H* (10-27-24 @ 11:55)    MICRO:    Urinalysis with Rflx Culture (collected 27 Oct 2024 17:00)      CARDS:  CARDIAC MARKERS ( 27 Oct 2024 12:33 )  x     / x     / x     / x     / 3.5 ng/mL        RADIOLOGY:      Lines:  Central line:              Date placed:             Indication:   Intravenous Access:   NG tube:   Alexander Catheter:       Potassium, Random Urine: STAT (10-27-24 @ 20:00)  Urea Nitrogen,  Random Urine: STAT (10-27-24 @ 20:00)  Osmolality, Random Urine: STAT (10-27-24 @ 20:00)  Protein/Creatinine Ratio, Urine: STAT (10-27-24 @ 20:00)  Sodium, Random Urine: STAT (10-27-24 @ 20:00)  Urinalysis: STAT (10-27-24 @ 20:00)  Drug Screen W/PCP, Urine: STAT (10-27-24 @ 20:00)  Troponin T, High Sensitivity: AM Sched. Collection: 28-Oct-2024 05:30 (10-27-24 @ 19:07)  Phosphorus: AM Sched. Collection: 28-Oct-2024 05:30 (10-27-24 @ 17:29)  Magnesium: AM Sched. Collection: 28-Oct-2024 05:30 (10-27-24 @ 17:29)  Basic Metabolic Panel: AM Sched. Collection: 28-Oct-2024 05:30 (10-27-24 @ 17:29)  Complete Blood Count + Automated Diff: AM Sched. Collection: 28-Oct-2024 05:30 (10-27-24 @ 17:29)     HOSPITAL COURSE:  CRISTOBAL PORRAS is a 84y year old Male with a PMHx significant for HTN, HLD, CAD (last PCI 18y ago), CHF (no echo on file), CKD (Cr 1.4 in ), GERD, stroke (residual RUE weakness), anxiety/depression, brought to ED by girlfriend Tanya, for leg swelling, redness, and oozing. Patient reports that his leg swelling has been on-going for ~3 weeks. Has gotten worse in the last 3 days, to the point he felt he needed to come in. Was dropped off by his girlfriend Tanya Lucas, however, she was not at bedside to provide further collateral. Patient has an extensive history of smoking approximately 70 years. Denies any similar episodes to this, although upon chart review, patient was admitted for bilateral lower extremity swelling and left leg non-purulent cellulitis in 2021. Denies any history of heart problems, fevers, chills, abdominal pain, chest pain. Endorses shortness of breath with exertion, orthopnea, leg.     Patient is unsure exactly what medications he takes at home but when shown a list of medications from [employer], he endorses that he takes these medications. Patient does not remember the name of his pharmacy - pharmacy's in sure script show [facility] on [address]  & [facility] [address] Per [employer], his PCP appears to be [facility].     SUBJECTIVE:  CRISTOBAL PORRAS is doing well this morning. Today is hospital day 1d.     24 Hour Events:   - No acute overnight events.    ALLERGIES:  No Known Allergies    MEDICATIONS:  STANDING MEDICATIONS  atorvastatin 40 milliGRAM(s) Oral at bedtime  ceFAZolin   IVPB 1000 milliGRAM(s) IV Intermittent every 12 hours  cloNIDine 0.1 milliGRAM(s) Oral two times a day  enoxaparin Injectable 30 milliGRAM(s) SubCutaneous every 24 hours  furosemide   Injectable 20 milliGRAM(s) IV Push daily  metoprolol succinate ER 50 milliGRAM(s) Oral every 12 hours  multivitamin 1 Tablet(s) Oral daily  pantoprazole    Tablet 40 milliGRAM(s) Oral before breakfast    PRN MEDICATIONS  acetaminophen     Tablet .. 650 milliGRAM(s) Oral every 6 hours PRN  ondansetron Injectable 4 milliGRAM(s) IV Push every 8 hours PRN    VITALS:   ICU Vital Signs Last 24 Hrs  T(C): 36.3 (28 Oct 2024 06:34), Max: 37 (27 Oct 2024 20:53)  T(F): 97.4 (28 Oct 2024 06:34), Max: 98.6 (27 Oct 2024 20:53)  HR: 71 (28 Oct 2024 06:34) (61 - 78)  BP: 122/65 (28 Oct 2024 06:34) (122/65 - 155/80)  BP(mean): --  ABP: --  ABP(mean): --  RR: 16 (28 Oct 2024 06:34) (16 - 22)  SpO2: 95% (28 Oct 2024 06:34) (92% - 97%)    O2 Parameters below as of 28 Oct 2024 06:34  Patient On (Oxygen Delivery Method): nasal cannula            PHYSICAL EXAM  General: well appearing, in no acute distress    HEENT: NC/AT, sclera anicteric, conjunctiva clear, mucus membranes moist, no lymphadenopathy, no JVD appreciated   Lungs: anterior and posterior lung fields clear to auscultation bilaterally, no wheezes, rhonchi or rales   Heart: regular rate and rhythm, normal S1 S2, no murmurs/rubs/gallops    Abdomen: soft, non-tender, non-distended, bowel sounds present   Extremities: b/l lower extremity 2+ edema to shins w/ erythema and ulcerations, no purulent drainage noted, distal pulses 2+ at DP and radial bilaterally    Skin: normal skin texture and turgor without rashes or lesions, no diaphoresis   Neuro: A&Ox3, clear speech, facial features symmetrical, moving all extremities spontaneously, strength grossly intact, sensation intact to light touch at distal upper and lower extremities bilaterally     LABS:                        13.0   13.82 )-----------( 125      ( 27 Oct 2024 12:33 )             41.4     10    139  |  97  |  38[H]  ----------------------------<  125[H]  4.6   |  32[H]  |  2.12[H]    Ca    8.8      27 Oct 2024 12:33    TPro  7.3  /  Alb  3.3  /  TBili  1.0  /  DBili  0.2  /  AST  34  /  ALT  17  /  AlkPhos  74  10-27      Urinalysis Basic - ( 27 Oct 2024 17:00 )    Color: Yellow / Appearance: Clear / S.015 / pH: x  Gluc: x / Ketone: Negative mg/dL  / Bili: Negative / Urobili: 1.0 mg/dL   Blood: x / Protein: 300 mg/dL / Nitrite: Negative   Leuk Esterase: Negative / RBC: 2 /HPF / WBC 0 /HPF   Sq Epi: x / Non Sq Epi: 0 /HPF / Bacteria: Negative /HPF        Lactate, Blood: 2.1 mmol/L *H* (10-27-24 @ 13:25)  Sedimentation Rate, Erythrocyte: 34 mm/Hr *H* (10-27-24 @ 12:33)  Lactate, Blood: 2.3 mmol/L *H* (10-27-24 @ 11:55)    MICRO:    Urinalysis with Rflx Culture (collected 27 Oct 2024 17:00)      CARDS:  CARDIAC MARKERS ( 27 Oct 2024 12:33 )  x     / x     / x     / x     / 3.5 ng/mL        RADIOLOGY:      Lines:  Central line:              Date placed:             Indication:   Intravenous Access:   NG tube:   Alexander Catheter:       Potassium, Random Urine: STAT (10-27-24 @ 20:00)  Urea Nitrogen,  Random Urine: STAT (10-27-24 @ 20:00)  Osmolality, Random Urine: STAT (10-27-24 @ 20:00)  Protein/Creatinine Ratio, Urine: STAT (10-27-24 @ 20:00)  Sodium, Random Urine: STAT (10-27-24 @ 20:00)  Urinalysis: STAT (10-27-24 @ 20:00)  Drug Screen W/PCP, Urine: STAT (10-27-24 @ 20:00)  Troponin T, High Sensitivity: AM Sched. Collection: 28-Oct-2024 05:30 (10-27-24 @ 19:07)  Phosphorus: AM Sched. Collection: 28-Oct-2024 05:30 (10-27-24 @ 17:29)  Magnesium: AM Sched. Collection: 28-Oct-2024 05:30 (10-27-24 @ 17:29)  Basic Metabolic Panel: AM Sched. Collection: 28-Oct-2024 05:30 (10-27-24 @ 17:29)  Complete Blood Count + Automated Diff: AM Sched. Collection: 28-Oct-2024 05:30 (10-27-24 @ 17:29)     HOSPITAL COURSE:  CRISTOBAL PORRAS is a 84y year old Male with a PMHx significant for HTN, HLD, CAD (last PCI 18y ago), CHF (no echo on file), CKD (Cr 1.4 in ), GERD, stroke (residual RUE weakness), anxiety/depression, brought to ED by girlfriend Tanya, for leg swelling, redness, and oozing. Patient reports that his leg swelling has been on-going for ~3 weeks. Has gotten worse in the last 3 days, to the point he felt he needed to come in. Was dropped off by his girlfriend Tanya Lucas, however, she was not at bedside to provide further collateral. Patient has an extensive history of smoking approximately 70 years. Denies any similar episodes to this, although upon chart review, patient was admitted for bilateral lower extremity swelling and left leg non-purulent cellulitis in 2021. Denies any history of heart problems, fevers, chills, abdominal pain, chest pain. Endorses shortness of breath with exertion, orthopnea, leg.     Patient is unsure exactly what medications he takes at home but when shown a list of medications from [employer], he endorses that he takes these medications. Patient does not remember the name of his pharmacy - pharmacy's in sure script show [facility] on [address]  & [facility] [address] Per [employer], his PCP appears to be [facility].     SUBJECTIVE:  CRISTOBAL PORRAS is doing well this morning. Today is hospital day 1d.     24 Hour Events:   - No acute overnight events. Noted good urine output throughout night.    ALLERGIES:  No Known Allergies    MEDICATIONS:  STANDING MEDICATIONS  atorvastatin 40 milliGRAM(s) Oral at bedtime  ceFAZolin   IVPB 1000 milliGRAM(s) IV Intermittent every 12 hours  cloNIDine 0.1 milliGRAM(s) Oral two times a day  enoxaparin Injectable 30 milliGRAM(s) SubCutaneous every 24 hours  furosemide   Injectable 20 milliGRAM(s) IV Push daily  metoprolol succinate ER 50 milliGRAM(s) Oral every 12 hours  multivitamin 1 Tablet(s) Oral daily  pantoprazole    Tablet 40 milliGRAM(s) Oral before breakfast    PRN MEDICATIONS  acetaminophen     Tablet .. 650 milliGRAM(s) Oral every 6 hours PRN  ondansetron Injectable 4 milliGRAM(s) IV Push every 8 hours PRN    VITALS:   ICU Vital Signs Last 24 Hrs  T(C): 36.3 (28 Oct 2024 06:34), Max: 37 (27 Oct 2024 20:53)  T(F): 97.4 (28 Oct 2024 06:34), Max: 98.6 (27 Oct 2024 20:53)  HR: 71 (28 Oct 2024 06:34) (61 - 78)  BP: 122/65 (28 Oct 2024 06:34) (122/65 - 155/80)  BP(mean): --  ABP: --  ABP(mean): --  RR: 16 (28 Oct 2024 06:34) (16 - 22)  SpO2: 95% (28 Oct 2024 06:34) (92% - 97%)    O2 Parameters below as of 28 Oct 2024 06:34  Patient On (Oxygen Delivery Method): nasal cannula            PHYSICAL EXAM  General: well appearing, in no acute distress    HEENT: NC/AT, sclera anicteric, conjunctiva clear, mucus membranes moist, no lymphadenopathy, no JVD appreciated   Lungs: anterior and posterior lung fields clear to auscultation bilaterally, no wheezes, rhonchi or rales   Heart: regular rate and rhythm, normal S1 S2, no murmurs/rubs/gallops    Abdomen: soft, non-tender, non-distended, bowel sounds present   Extremities: b/l lower extremity 2+ edema to shins w/ erythema and ulcerations, no purulent drainage noted, distal pulses 2+ at DP and radial bilaterally    Skin: normal skin texture and turgor without rashes or lesions, no diaphoresis   Neuro: A&Ox3, clear speech, facial features symmetrical, moving all extremities spontaneously, strength grossly intact, sensation intact to light touch at distal upper and lower extremities bilaterally     LABS:                        13.0   13.82 )-----------( 125      ( 27 Oct 2024 12:33 )             41.4     10-27    139  |  97  |  38[H]  ----------------------------<  125[H]  4.6   |  32[H]  |  2.12[H]    Ca    8.8      27 Oct 2024 12:33    TPro  7.3  /  Alb  3.3  /  TBili  1.0  /  DBili  0.2  /  AST  34  /  ALT  17  /  AlkPhos  74  10-27      Urinalysis Basic - ( 27 Oct 2024 17:00 )    Color: Yellow / Appearance: Clear / S.015 / pH: x  Gluc: x / Ketone: Negative mg/dL  / Bili: Negative / Urobili: 1.0 mg/dL   Blood: x / Protein: 300 mg/dL / Nitrite: Negative   Leuk Esterase: Negative / RBC: 2 /HPF / WBC 0 /HPF   Sq Epi: x / Non Sq Epi: 0 /HPF / Bacteria: Negative /HPF        Lactate, Blood: 2.1 mmol/L *H* (10-27-24 @ 13:25)  Sedimentation Rate, Erythrocyte: 34 mm/Hr *H* (10-27-24 @ 12:33)  Lactate, Blood: 2.3 mmol/L *H* (10-27-24 @ 11:55)    MICRO:    Urinalysis with Rflx Culture (collected 27 Oct 2024 17:00)      CARDS:  CARDIAC MARKERS ( 27 Oct 2024 12:33 )  x     / x     / x     / x     / 3.5 ng/mL        RADIOLOGY:  < from: Xray Chest 1 View-PORTABLE IMMEDIATE (Xray Chest 1 View-PORTABLE IMMEDIATE .) (10.27.24 @ 14:46) >  IMPRESSION:    Limited evaluation of the cardiomediastinal silhouette due to low lung   volumes and magnification from portable radiograph acquisition. No   consolidations. No pleural effusions. No pneumothorax. Degenerative   changes of the spine. No acute abnormalities of the soft tissues and   osseous structures.    < end of copied text >      Lines:  Central line:              Date placed:             Indication:   Intravenous Access:   NG tube:   Alexander Catheter:       Potassium, Random Urine: STAT (10-27-24 @ 20:00)  Urea Nitrogen,  Random Urine: STAT (10-27-24 @ 20:00)  Osmolality, Random Urine: STAT (10-27-24 @ 20:00)  Protein/Creatinine Ratio, Urine: STAT (10-27-24 @ 20:00)  Sodium, Random Urine: STAT (10-27-24 @ 20:00)  Urinalysis: STAT (10-27-24 @ 20:00)  Drug Screen W/PCP, Urine: STAT (10-27-24 @ 20:00)  Troponin T, High Sensitivity: AM Sched. Collection: 28-Oct-2024 05:30 (10-27-24 @ 19:07)  Phosphorus: AM Sched. Collection: 28-Oct-2024 05:30 (10-27-24 @ 17:29)  Magnesium: AM Sched. Collection: 28-Oct-2024 05:30 (10-27-24 @ 17:29)  Basic Metabolic Panel: AM Sched. Collection: 28-Oct-2024 05:30 (10-27-24 @ 17:29)  Complete Blood Count + Automated Diff: AM Sched. Collection: 28-Oct-2024 05:30 (10-27-24 @ 17:29)     HOSPITAL COURSE:  CRISTOBAL PORRAS is a 84y year old Male with a PMHx significant for HTN, HLD, CAD (last PCI 18y ago), CHF (no echo on file), CKD (Cr 1.4 in ), GERD, stroke (residual RUE weakness), anxiety/depression, brought to ED by girlfriend Tanya, for leg swelling, redness, and oozing. Patient reports that his leg swelling has been on-going for ~3 weeks. Has gotten worse in the last 3 days, to the point he felt he needed to come in. Was dropped off by his girlfriend Tanya Lucas, however, she was not at bedside to provide further collateral. Patient has an extensive history of smoking approximately 70 years. Denies any similar episodes to this, although upon chart review, patient was admitted for bilateral lower extremity swelling and left leg non-purulent cellulitis in 2021. Denies any history of heart problems, fevers, chills, abdominal pain, chest pain. Endorses shortness of breath with exertion, orthopnea, leg.     Patient is unsure exactly what medications he takes at home but when shown a list of medications from [employer], he endorses that he takes these medications. Patient does not remember the name of his pharmacy - pharmacy's in sure script show [facility] on [address]  & [facility] [address] Per [employer], his PCP appears to be [facility].     SUBJECTIVE:  CRISTOBAL PORRAS is doing well this morning. Today is hospital day 1d.     24 Hour Events:   - No acute overnight events. Noted good urine output throughout night.    ALLERGIES:  No Known Allergies    MEDICATIONS:  STANDING MEDICATIONS  atorvastatin 40 milliGRAM(s) Oral at bedtime  ceFAZolin   IVPB 1000 milliGRAM(s) IV Intermittent every 12 hours  cloNIDine 0.1 milliGRAM(s) Oral two times a day  enoxaparin Injectable 30 milliGRAM(s) SubCutaneous every 24 hours  furosemide   Injectable 20 milliGRAM(s) IV Push daily  metoprolol succinate ER 50 milliGRAM(s) Oral every 12 hours  multivitamin 1 Tablet(s) Oral daily  pantoprazole    Tablet 40 milliGRAM(s) Oral before breakfast    PRN MEDICATIONS  acetaminophen     Tablet .. 650 milliGRAM(s) Oral every 6 hours PRN  ondansetron Injectable 4 milliGRAM(s) IV Push every 8 hours PRN    VITALS:   ICU Vital Signs Last 24 Hrs  T(C): 36.3 (28 Oct 2024 06:34), Max: 37 (27 Oct 2024 20:53)  T(F): 97.4 (28 Oct 2024 06:34), Max: 98.6 (27 Oct 2024 20:53)  HR: 71 (28 Oct 2024 06:34) (61 - 78)  BP: 122/65 (28 Oct 2024 06:34) (122/65 - 155/80)  BP(mean): --  ABP: --  ABP(mean): --  RR: 16 (28 Oct 2024 06:34) (16 - 22)  SpO2: 95% (28 Oct 2024 06:34) (92% - 97%)    O2 Parameters below as of 28 Oct 2024 06:34  Patient On (Oxygen Delivery Method): nasal cannula            PHYSICAL EXAM  General: well appearing, mumbled speech & in no acute distress    HEENT: NC/AT, sclera anicteric, conjunctiva clear, mucus membranes moist, no lymphadenopathy  Lungs: anterior and posterior lung fields clear to auscultation bilaterally, no wheezes, rhonchi or rales   Heart: regular rate and rhythm, normal S1 S2, no murmurs/rubs/gallops    Abdomen: soft, non-tender, non-distended, bowel sounds present   Extremities: b/l lower extremity 2+ edema to shins w/ erythema to mid-shin, ulcerations on R leg, no purulent drainage noted, distal pulses 1+ at DP bilaterally   Skin: dry skin on b/l feet, no diaphoresis   Neuro: A&Ox3, mumbled speech, facial features symmetrical, moving all extremities spontaneously with R arm weakness and dec strength, sensation intact to light touch at distal upper and lower extremities bilaterally     LABS:                        13.0   13.82 )-----------( 125      ( 27 Oct 2024 12:33 )             41.4     10    139  |  97  |  38[H]  ----------------------------<  125[H]  4.6   |  32[H]  |  2.12[H]    Ca    8.8      27 Oct 2024 12:33    TPro  7.3  /  Alb  3.3  /  TBili  1.0  /  DBili  0.2  /  AST  34  /  ALT  17  /  AlkPhos  74  10-27      Urinalysis Basic - ( 27 Oct 2024 17:00 )    Color: Yellow / Appearance: Clear / S.015 / pH: x  Gluc: x / Ketone: Negative mg/dL  / Bili: Negative / Urobili: 1.0 mg/dL   Blood: x / Protein: 300 mg/dL / Nitrite: Negative   Leuk Esterase: Negative / RBC: 2 /HPF / WBC 0 /HPF   Sq Epi: x / Non Sq Epi: 0 /HPF / Bacteria: Negative /HPF        Lactate, Blood: 2.1 mmol/L *H* (10-27-24 @ 13:25)  Sedimentation Rate, Erythrocyte: 34 mm/Hr *H* (10-27-24 @ 12:33)  Lactate, Blood: 2.3 mmol/L *H* (10-27-24 @ 11:55)    MICRO:    Urinalysis with Rflx Culture (collected 27 Oct 2024 17:00)      CARDS:  CARDIAC MARKERS ( 27 Oct 2024 12:33 )  x     / x     / x     / x     / 3.5 ng/mL        RADIOLOGY:  < from: Xray Chest 1 View-PORTABLE IMMEDIATE (Xray Chest 1 View-PORTABLE IMMEDIATE .) (10.27.24 @ 14:46) >  IMPRESSION:    Limited evaluation of the cardiomediastinal silhouette due to low lung   volumes and magnification from portable radiograph acquisition. No   consolidations. No pleural effusions. No pneumothorax. Degenerative   changes of the spine. No acute abnormalities of the soft tissues and   osseous structures.    < end of copied text >      Lines:  Central line:              Date placed:             Indication:   Intravenous Access:   NG tube:   Alexander Catheter:       Potassium, Random Urine: STAT (10-27-24 @ 20:00)  Urea Nitrogen,  Random Urine: STAT (10-27-24 @ 20:00)  Osmolality, Random Urine: STAT (10-27-24 @ 20:00)  Protein/Creatinine Ratio, Urine: STAT (10-27-24 @ 20:00)  Sodium, Random Urine: STAT (10-27-24 @ 20:00)  Urinalysis: STAT (10-27-24 @ 20:00)  Drug Screen W/PCP, Urine: STAT (10-27-24 @ 20:00)  Troponin T, High Sensitivity: AM Sched. Collection: 28-Oct-2024 05:30 (10-27-24 @ 19:07)  Phosphorus: AM Sched. Collection: 28-Oct-2024 05:30 (10-27-24 @ 17:29)  Magnesium: AM Sched. Collection: 28-Oct-2024 05:30 (10-27-24 @ 17:29)  Basic Metabolic Panel: AM Sched. Collection: 28-Oct-2024 05:30 (10-27-24 @ 17:29)  Complete Blood Count + Automated Diff: AM Sched. Collection: 28-Oct-2024 05:30 (10-27-24 @ 17:29)     HOSPITAL COURSE:  CRISTOBAL PORRAS is a 84y year old Male with a PMHx significant for HTN, HLD, CAD (last PCI 18y ago), CHF (no echo on file), CKD (Cr 1.4 in ), GERD, stroke (residual RUE weakness), anxiety/depression, brought to ED by girlfriend Tanya, for leg swelling, redness, and oozing. Patient reports that his leg swelling has been on-going for ~3 weeks. Has gotten worse in the last 3 days, to the point he felt he needed to come in. Was dropped off by his girlfriend Tanya Lucas, however, she was not at bedside to provide further collateral. Patient has an extensive history of smoking approximately 70 years. Denies any similar episodes to this, although upon chart review, patient was admitted for bilateral lower extremity swelling and left leg non-purulent cellulitis in 2021. Denies any history of heart problems, fevers, chills, abdominal pain, chest pain. Endorses shortness of breath with exertion, orthopnea, leg.     Patient is unsure exactly what medications he takes at home but when shown a list of medications from [facility], he endorses that he takes these medications. Patient does not remember the name of his pharmacy - pharmacy's in sure script show [facility] on [address]  & [facility] [address] Per [employer], his PCP appears to be Stone Allen.     SUBJECTIVE:  CRISTOBAL PORRAS is doing well this morning. Today is hospital day 1d. Pt is c/o being cold. Denies SOB, N/V/D.     24 Hour Events:   - No acute overnight events. Noted good urine output throughout night.    ALLERGIES:  No Known Allergies    MEDICATIONS:  STANDING MEDICATIONS  atorvastatin 40 milliGRAM(s) Oral at bedtime  ceFAZolin   IVPB 1000 milliGRAM(s) IV Intermittent every 12 hours  cloNIDine 0.1 milliGRAM(s) Oral two times a day  enoxaparin Injectable 30 milliGRAM(s) SubCutaneous every 24 hours  furosemide   Injectable 20 milliGRAM(s) IV Push daily  metoprolol succinate ER 50 milliGRAM(s) Oral every 12 hours  multivitamin 1 Tablet(s) Oral daily  pantoprazole    Tablet 40 milliGRAM(s) Oral before breakfast    PRN MEDICATIONS  acetaminophen     Tablet .. 650 milliGRAM(s) Oral every 6 hours PRN  ondansetron Injectable 4 milliGRAM(s) IV Push every 8 hours PRN    VITALS:   ICU Vital Signs Last 24 Hrs  T(C): 36.3 (28 Oct 2024 06:34), Max: 37 (27 Oct 2024 20:53)  T(F): 97.4 (28 Oct 2024 06:34), Max: 98.6 (27 Oct 2024 20:53)  HR: 71 (28 Oct 2024 06:34) (61 - 78)  BP: 122/65 (28 Oct 2024 06:34) (122/65 - 155/80)  BP(mean): --  ABP: --  ABP(mean): --  RR: 16 (28 Oct 2024 06:34) (16 - 22)  SpO2: 95% (28 Oct 2024 06:34) (92% - 97%)    O2 Parameters below as of 28 Oct 2024 06:34  Patient On (Oxygen Delivery Method): nasal cannula            PHYSICAL EXAM  General: well appearing, mumbled speech & in no acute distress    HEENT: NC/AT, sclera anicteric, conjunctiva clear, mucus membranes moist, no lymphadenopathy  Lungs: anterior and posterior lung fields clear to auscultation bilaterally, no wheezes, rhonchi or rales   Heart: regular rate and rhythm, normal S1 S2, no murmurs/rubs/gallops    Abdomen: soft, non-tender, non-distended, bowel sounds present   Extremities: b/l 2+ lower extremity edema w/ erythema up to mid-shin, ulcerations on R leg, no purulent drainage noted, distal pulses 1+ at DP bilaterally   Skin: dry skin on b/l feet, no diaphoresis   Neuro: A&Ox3, mumbled speech, facial features symmetrical, moving all extremities spontaneously with R arm weakness and dec strength, sensation intact to light touch at distal upper and lower extremities bilaterally     LABS:                        13.0   13.82 )-----------( 125      ( 27 Oct 2024 12:33 )             41.4     10-27    139  |  97  |  38[H]  ----------------------------<  125[H]  4.6   |  32[H]  |  2.12[H]    Ca    8.8      27 Oct 2024 12:33    TPro  7.3  /  Alb  3.3  /  TBili  1.0  /  DBili  0.2  /  AST  34  /  ALT  17  /  AlkPhos  74  10-27      Urinalysis Basic - ( 27 Oct 2024 17:00 )    Color: Yellow / Appearance: Clear / S.015 / pH: x  Gluc: x / Ketone: Negative mg/dL  / Bili: Negative / Urobili: 1.0 mg/dL   Blood: x / Protein: 300 mg/dL / Nitrite: Negative   Leuk Esterase: Negative / RBC: 2 /HPF / WBC 0 /HPF   Sq Epi: x / Non Sq Epi: 0 /HPF / Bacteria: Negative /HPF        Lactate, Blood: 2.1 mmol/L *H* (10-27-24 @ 13:25)  Sedimentation Rate, Erythrocyte: 34 mm/Hr *H* (10-27-24 @ 12:33)  Lactate, Blood: 2.3 mmol/L *H* (10-27-24 @ 11:55)    MICRO:    Urinalysis with Rflx Culture (collected 27 Oct 2024 17:00)      CARDS:  CARDIAC MARKERS ( 27 Oct 2024 12:33 )  x     / x     / x     / x     / 3.5 ng/mL        RADIOLOGY:  < from: Xray Chest 1 View-PORTABLE IMMEDIATE (Xray Chest 1 View-PORTABLE IMMEDIATE .) (10.27.24 @ 14:46) >  IMPRESSION:    Limited evaluation of the cardiomediastinal silhouette due to low lung   volumes and magnification from portable radiograph acquisition. No   consolidations. No pleural effusions. No pneumothorax. Degenerative   changes of the spine. No acute abnormalities of the soft tissues and   osseous structures.    < end of copied text >      Lines:  Central line:              Date placed:             Indication:   Intravenous Access:   NG tube:   Alexander Catheter:       Potassium, Random Urine: STAT (10-27-24 @ 20:00)  Urea Nitrogen,  Random Urine: STAT (10-27-24 @ 20:00)  Osmolality, Random Urine: STAT (10-27-24 @ 20:00)  Protein/Creatinine Ratio, Urine: STAT (10-27-24 @ 20:00)  Sodium, Random Urine: STAT (10-27-24 @ 20:00)  Urinalysis: STAT (10-27-24 @ 20:00)  Drug Screen W/PCP, Urine: STAT (10-27-24 @ 20:00)  Troponin T, High Sensitivity: AM Sched. Collection: 28-Oct-2024 05:30 (10-27-24 @ 19:07)  Phosphorus: AM Sched. Collection: 28-Oct-2024 05:30 (10-27-24 @ 17:29)  Magnesium: AM Sched. Collection: 28-Oct-2024 05:30 (10-27-24 @ 17:29)  Basic Metabolic Panel: AM Sched. Collection: 28-Oct-2024 05:30 (10-27-24 @ 17:29)  Complete Blood Count + Automated Diff: AM Sched. Collection: 28-Oct-2024 05:30 (10-27-24 @ 17:29)     HOSPITAL COURSE:  CRISTOBAL PORRAS is a 84y year old Male with a PMHx significant for HTN, HLD, CAD (last PCI 18y ago), CHF (no echo on file), CKD (Cr 1.4 in ), GERD, stroke (residual RUE weakness), anxiety/depression, brought to ED by girlfriend Tanya, for leg swelling, redness, and oozing. Patient reports that his leg swelling has been on-going for ~3 weeks. Has gotten worse in the last 3 days, to the point he felt he needed to come in. Was dropped off by his girlfriend Tanya Lucas, however, she was not at bedside to provide further collateral. Patient has an extensive history of smoking approximately 70 years. Denies any similar episodes to this, although upon chart review, patient was admitted for bilateral lower extremity swelling and left leg non-purulent cellulitis in 2021. Denies any history of heart problems, fevers, chills, abdominal pain, chest pain. Endorses shortness of breath with exertion, orthopnea, leg.     Patient is unsure exactly what medications he takes at home but when shown a list of medications from [employer], he endorses that he takes these medications. Patient does not remember the name of his pharmacy - pharmacy's in sure script show [facility] on [address]  & [facility] [address] Per [employer], his PCP appears to be [facility].     SUBJECTIVE:  CRISTOBAL PORRAS is doing well this morning. Today is hospital day 1d. Pt currently c/o being cold. Denies SOB, N/V/D.     24 Hour Events:   - No acute overnight events. Noted good urine output throughout night.    ALLERGIES:  No Known Allergies    MEDICATIONS:  STANDING MEDICATIONS  atorvastatin 40 milliGRAM(s) Oral at bedtime  ceFAZolin   IVPB 1000 milliGRAM(s) IV Intermittent every 12 hours  cloNIDine 0.1 milliGRAM(s) Oral two times a day  enoxaparin Injectable 30 milliGRAM(s) SubCutaneous every 24 hours  furosemide   Injectable 20 milliGRAM(s) IV Push daily  metoprolol succinate ER 50 milliGRAM(s) Oral every 12 hours  multivitamin 1 Tablet(s) Oral daily  pantoprazole    Tablet 40 milliGRAM(s) Oral before breakfast    PRN MEDICATIONS  acetaminophen     Tablet .. 650 milliGRAM(s) Oral every 6 hours PRN  ondansetron Injectable 4 milliGRAM(s) IV Push every 8 hours PRN    VITALS:   ICU Vital Signs Last 24 Hrs  T(C): 36.3 (28 Oct 2024 06:34), Max: 37 (27 Oct 2024 20:53)  T(F): 97.4 (28 Oct 2024 06:34), Max: 98.6 (27 Oct 2024 20:53)  HR: 71 (28 Oct 2024 06:34) (61 - 78)  BP: 122/65 (28 Oct 2024 06:34) (122/65 - 155/80)  BP(mean): --  ABP: --  ABP(mean): --  RR: 16 (28 Oct 2024 06:34) (16 - 22)  SpO2: 95% (28 Oct 2024 06:34) (92% - 97%)    O2 Parameters below as of 28 Oct 2024 06:34  Patient On (Oxygen Delivery Method): nasal cannula            PHYSICAL EXAM  General: well appearing, mumbled speech & in no acute distress    HEENT: NC/AT, sclera anicteric, conjunctiva clear, mucus membranes moist, no lymphadenopathy  Lungs: anterior and posterior lung fields clear to auscultation bilaterally, no wheezes, rhonchi or rales   Heart: regular rate and rhythm, normal S1 S2, no murmurs/rubs/gallops    Abdomen: soft, non-tender, non-distended, bowel sounds present   Extremities: b/l 2+ lower extremity edema w/ erythema and discoloration up to mid-shin, ulcerations on R leg, no purulent drainage noted, distal pulses 1+ at DP bilaterally   Skin: dry skin on b/l feet, no diaphoresis   Neuro: A&Ox3, mumbled speech, facial features symmetrical, moving all extremities spontaneously with R arm weakness and dec strength, sensation intact to light touch at distal upper and lower extremities bilaterally     LABS:                        13.0   13.82 )-----------( 125      ( 27 Oct 2024 12:33 )             41.4     10-27    139  |  97  |  38[H]  ----------------------------<  125[H]  4.6   |  32[H]  |  2.12[H]    Ca    8.8      27 Oct 2024 12:33    TPro  7.3  /  Alb  3.3  /  TBili  1.0  /  DBili  0.2  /  AST  34  /  ALT  17  /  AlkPhos  74  10-27      Urinalysis Basic - ( 27 Oct 2024 17:00 )    Color: Yellow / Appearance: Clear / S.015 / pH: x  Gluc: x / Ketone: Negative mg/dL  / Bili: Negative / Urobili: 1.0 mg/dL   Blood: x / Protein: 300 mg/dL / Nitrite: Negative   Leuk Esterase: Negative / RBC: 2 /HPF / WBC 0 /HPF   Sq Epi: x / Non Sq Epi: 0 /HPF / Bacteria: Negative /HPF        Lactate, Blood: 2.1 mmol/L *H* (10-27-24 @ 13:25)  Sedimentation Rate, Erythrocyte: 34 mm/Hr *H* (10-27-24 @ 12:33)  Lactate, Blood: 2.3 mmol/L *H* (10-27-24 @ 11:55)    MICRO:    Urinalysis with Rflx Culture (collected 27 Oct 2024 17:00)      CARDS:  CARDIAC MARKERS ( 27 Oct 2024 12:33 )  x     / x     / x     / x     / 3.5 ng/mL        RADIOLOGY:  < from: Xray Chest 1 View-PORTABLE IMMEDIATE (Xray Chest 1 View-PORTABLE IMMEDIATE .) (10.27.24 @ 14:46) >  IMPRESSION:    Limited evaluation of the cardiomediastinal silhouette due to low lung   volumes and magnification from portable radiograph acquisition. No   consolidations. No pleural effusions. No pneumothorax. Degenerative   changes of the spine. No acute abnormalities of the soft tissues and   osseous structures.    < end of copied text >      Lines:  Central line:              Date placed:             Indication:   Intravenous Access:   NG tube:   Alexander Catheter:       Potassium, Random Urine: STAT (10-27-24 @ 20:00)  Urea Nitrogen,  Random Urine: STAT (10-27-24 @ 20:00)  Osmolality, Random Urine: STAT (10-27-24 @ 20:00)  Protein/Creatinine Ratio, Urine: STAT (10-27-24 @ 20:00)  Sodium, Random Urine: STAT (10-27-24 @ 20:00)  Urinalysis: STAT (10-27-24 @ 20:00)  Drug Screen W/PCP, Urine: STAT (10-27-24 @ 20:00)  Troponin T, High Sensitivity: AM Sched. Collection: 28-Oct-2024 05:30 (10-27-24 @ 19:07)  Phosphorus: AM Sched. Collection: 28-Oct-2024 05:30 (10-27-24 @ 17:29)  Magnesium: AM Sched. Collection: 28-Oct-2024 05:30 (10-27-24 @ 17:29)  Basic Metabolic Panel: AM Sched. Collection: 28-Oct-2024 05:30 (10-27-24 @ 17:29)  Complete Blood Count + Automated Diff: AM Sched. Collection: 28-Oct-2024 05:30 (10-27-24 @ 17:29)     HOSPITAL COURSE:  CRISTOBAL PORRAS is a 84y year old Male with a PMHx significant for HTN, HLD, CAD (last PCI 18y ago), CHF (no echo on file), CKD (Cr 1.4 in ), GERD, stroke (residual RUE weakness), anxiety/depression, brought to ED by girlfriend Tanya, for leg swelling, redness, and oozing. Patient reports that his leg swelling has been on-going for ~3 weeks. Has gotten worse in the last 3 days, to the point he felt he needed to come in. Was dropped off by his girlfriend Tanya Lucas, however, she was not at bedside to provide further collateral. Patient has an extensive history of smoking approximately 70 years. Denies any similar episodes to this, although upon chart review, patient was admitted for bilateral lower extremity swelling and left leg non-purulent cellulitis in 2021. Denies any history of heart problems, fevers, chills, abdominal pain, chest pain. Endorses shortness of breath with exertion, orthopnea, leg.     Patient is unsure exactly what medications he takes at home but when shown a list of medications from [employer], he endorses that he takes these medications. Patient does not remember the name of his pharmacy - pharmacy's in sure script show [facility] on [address]  & [facility] [address] Per [employer], his PCP appears to be [facility].     SUBJECTIVE:  CRISTOBAL PORRAS is doing well this morning. Today is hospital day 1d. Pt currently c/o being cold. Denies SOB, N/V/D.     24 Hour Events:   - No acute overnight events. Noted good urine output throughout night.    ALLERGIES:  No Known Allergies    MEDICATIONS:  STANDING MEDICATIONS  atorvastatin 40 milliGRAM(s) Oral at bedtime  ceFAZolin   IVPB 1000 milliGRAM(s) IV Intermittent every 12 hours  cloNIDine 0.1 milliGRAM(s) Oral two times a day  enoxaparin Injectable 30 milliGRAM(s) SubCutaneous every 24 hours  furosemide   Injectable 20 milliGRAM(s) IV Push daily  metoprolol succinate ER 50 milliGRAM(s) Oral every 12 hours  multivitamin 1 Tablet(s) Oral daily  pantoprazole    Tablet 40 milliGRAM(s) Oral before breakfast    PRN MEDICATIONS  acetaminophen     Tablet .. 650 milliGRAM(s) Oral every 6 hours PRN  ondansetron Injectable 4 milliGRAM(s) IV Push every 8 hours PRN    VITALS:   ICU Vital Signs Last 24 Hrs  T(C): 36.3 (28 Oct 2024 06:34), Max: 37 (27 Oct 2024 20:53)  T(F): 97.4 (28 Oct 2024 06:34), Max: 98.6 (27 Oct 2024 20:53)  HR: 71 (28 Oct 2024 06:34) (61 - 78)  BP: 122/65 (28 Oct 2024 06:34) (122/65 - 155/80)  BP(mean): --  ABP: --  ABP(mean): --  RR: 16 (28 Oct 2024 06:34) (16 - 22)  SpO2: 95% (28 Oct 2024 06:34) (92% - 97%)    O2 Parameters below as of 28 Oct 2024 06:34  Patient On (Oxygen Delivery Method): nasal cannula            PHYSICAL EXAM  General: well appearing, mumbled speech & in no acute distress    HEENT: NC/AT, sclera anicteric, conjunctiva clear, mucus membranes moist, no lymphadenopathy  Lungs: anterior and posterior lung fields clear to auscultation bilaterally, no wheezes, rhonchi or rales   Heart: regular rate and rhythm, normal S1 S2, no murmurs/rubs/gallops    Abdomen: soft, non-tender, non-distended, bowel sounds present   Extremities: b/l 3+ lower extremity edema w/ erythema and discoloration up to mid-shin, ulcerations on R leg, no purulent drainage noted, distal pulses 1+ at DP bilaterally   Skin: dry skin on b/l feet, no diaphoresis   Neuro: A&Ox3, mumbled speech, facial features symmetrical, moving all extremities spontaneously with R arm weakness and 2/5 strength to abduction and flexion, sensation intact to light touch at distal upper and lower extremities bilaterally     LABS:                        13.0   13.82 )-----------( 125      ( 27 Oct 2024 12:33 )             41.4     10    139  |  97  |  38[H]  ----------------------------<  125[H]  4.6   |  32[H]  |  2.12[H]    Ca    8.8      27 Oct 2024 12:33    TPro  7.3  /  Alb  3.3  /  TBili  1.0  /  DBili  0.2  /  AST  34  /  ALT  17  /  AlkPhos  74  10-27      Urinalysis Basic - ( 27 Oct 2024 17:00 )    Color: Yellow / Appearance: Clear / S.015 / pH: x  Gluc: x / Ketone: Negative mg/dL  / Bili: Negative / Urobili: 1.0 mg/dL   Blood: x / Protein: 300 mg/dL / Nitrite: Negative   Leuk Esterase: Negative / RBC: 2 /HPF / WBC 0 /HPF   Sq Epi: x / Non Sq Epi: 0 /HPF / Bacteria: Negative /HPF        Lactate, Blood: 2.1 mmol/L *H* (10-27-24 @ 13:25)  Sedimentation Rate, Erythrocyte: 34 mm/Hr *H* (10-27-24 @ 12:33)  Lactate, Blood: 2.3 mmol/L *H* (10-27-24 @ 11:55)    MICRO:    Urinalysis with Rflx Culture (collected 27 Oct 2024 17:00)      CARDS:  CARDIAC MARKERS ( 27 Oct 2024 12:33 )  x     / x     / x     / x     / 3.5 ng/mL        RADIOLOGY:  < from: Xray Chest 1 View-PORTABLE IMMEDIATE (Xray Chest 1 View-PORTABLE IMMEDIATE .) (10.27.24 @ 14:46) >  IMPRESSION:    Limited evaluation of the cardiomediastinal silhouette due to low lung   volumes and magnification from portable radiograph acquisition. No   consolidations. No pleural effusions. No pneumothorax. Degenerative   changes of the spine. No acute abnormalities of the soft tissues and   osseous structures.    < end of copied text >      Lines:  Central line:              Date placed:             Indication:   Intravenous Access:   NG tube:   Alexander Catheter:       Potassium, Random Urine: STAT (10-27-24 @ 20:00)  Urea Nitrogen,  Random Urine: STAT (10-27-24 @ 20:00)  Osmolality, Random Urine: STAT (10-27-24 @ 20:00)  Protein/Creatinine Ratio, Urine: STAT (10-27-24 @ 20:00)  Sodium, Random Urine: STAT (10-27-24 @ 20:00)  Urinalysis: STAT (10-27-24 @ 20:00)  Drug Screen W/PCP, Urine: STAT (10-27-24 @ 20:00)  Troponin T, High Sensitivity: AM Sched. Collection: 28-Oct-2024 05:30 (10-27-24 @ 19:07)  Phosphorus: AM Sched. Collection: 28-Oct-2024 05:30 (10-27-24 @ 17:29)  Magnesium: AM Sched. Collection: 28-Oct-2024 05:30 (10-27-24 @ 17:29)  Basic Metabolic Panel: AM Sched. Collection: 28-Oct-2024 05:30 (10-27-24 @ 17:29)  Complete Blood Count + Automated Diff: AM Sched. Collection: 28-Oct-2024 05:30 (10-27-24 @ 17:29)     HOSPITAL COURSE:  CRISTOBAL PORRAS is a 84y year old Male with a PMHx significant for HTN, HLD, CAD (last PCI 18y ago), CHF (no echo on file), CKD (Cr 1.4 in ), GERD, stroke (residual RUE weakness), anxiety/depression, brought to ED by girlfriend Tanya, for leg swelling, redness, and oozing. Patient reports that his leg swelling has been on-going for ~3 weeks. Has gotten worse in the last 3 days, to the point he felt he needed to come in. Was dropped off by his girlfriend Tanya Lucas, however, she was not at bedside to provide further collateral. Patient has an extensive history of smoking approximately 70 years. Denies any similar episodes to this, although upon chart review, patient was admitted for bilateral lower extremity swelling and left leg non-purulent cellulitis in 2021. Denies any history of heart problems, fevers, chills, abdominal pain, chest pain. Endorses shortness of breath with exertion, orthopnea, leg.     Patient is unsure exactly what medications he takes at home but when shown a list of medications from [facility], he endorses that he takes these medications. Patient does not remember the name of his pharmacy - pharmacy's in sure script show [facility] on [address]  & [facility] [address] Per [employer], his PCP appears to be [facility].     SUBJECTIVE:  CRISTOBAL PORRAS is doing well this morning. Today is hospital day 1d. Pt currently c/o being cold. Denies SOB, palpitations, N/V/D.     24 Hour Events:   - No acute overnight events. Noted good urine output throughout night.    ALLERGIES:  No Known Allergies    MEDICATIONS:  STANDING MEDICATIONS  atorvastatin 40 milliGRAM(s) Oral at bedtime  ceFAZolin   IVPB 1000 milliGRAM(s) IV Intermittent every 12 hours  cloNIDine 0.1 milliGRAM(s) Oral two times a day  enoxaparin Injectable 30 milliGRAM(s) SubCutaneous every 24 hours  furosemide   Injectable 20 milliGRAM(s) IV Push daily  metoprolol succinate ER 50 milliGRAM(s) Oral every 12 hours  multivitamin 1 Tablet(s) Oral daily  pantoprazole    Tablet 40 milliGRAM(s) Oral before breakfast    PRN MEDICATIONS  acetaminophen     Tablet .. 650 milliGRAM(s) Oral every 6 hours PRN  ondansetron Injectable 4 milliGRAM(s) IV Push every 8 hours PRN    VITALS:   ICU Vital Signs Last 24 Hrs  T(C): 36.3 (28 Oct 2024 06:34), Max: 37 (27 Oct 2024 20:53)  T(F): 97.4 (28 Oct 2024 06:34), Max: 98.6 (27 Oct 2024 20:53)  HR: 71 (28 Oct 2024 06:34) (61 - 78)  BP: 122/65 (28 Oct 2024 06:34) (122/65 - 155/80)  BP(mean): --  ABP: --  ABP(mean): --  RR: 16 (28 Oct 2024 06:34) (16 - 22)  SpO2: 95% (28 Oct 2024 06:34) (92% - 97%)    O2 Parameters below as of 28 Oct 2024 06:34  Patient On (Oxygen Delivery Method): nasal cannula            PHYSICAL EXAM  General: well appearing, mumbled speech & in no acute distress    HEENT: NC/AT, sclera anicteric, conjunctiva clear, mucus membranes moist, no lymphadenopathy  Lungs: anterior and posterior lung fields clear to auscultation bilaterally, no wheezes, rhonchi or rales   Heart: regular rate and rhythm, normal S1 S2, no murmurs/rubs/gallops    Abdomen: soft, non-tender, non-distended, bowel sounds present   Extremities: b/l 3+ lower extremity edema w/ erythema and discoloration up to mid-shin, ulcerations on R leg, no purulent drainage noted, distal pulses 1+ at DP bilaterally   Skin: dry skin on b/l feet, no diaphoresis   Neuro: A&Ox3, mumbled speech, facial features symmetrical, moving all extremities spontaneously with R arm weakness and 2/5 strength to abduction and flexion, sensation intact to light touch at distal upper and lower extremities bilaterally     LABS:                        13.0   13.82 )-----------( 125      ( 27 Oct 2024 12:33 )             41.4     10-27    139  |  97  |  38[H]  ----------------------------<  125[H]  4.6   |  32[H]  |  2.12[H]    Ca    8.8      27 Oct 2024 12:33    TPro  7.3  /  Alb  3.3  /  TBili  1.0  /  DBili  0.2  /  AST  34  /  ALT  17  /  AlkPhos  74  10-27      Urinalysis Basic - ( 27 Oct 2024 17:00 )    Color: Yellow / Appearance: Clear / S.015 / pH: x  Gluc: x / Ketone: Negative mg/dL  / Bili: Negative / Urobili: 1.0 mg/dL   Blood: x / Protein: 300 mg/dL / Nitrite: Negative   Leuk Esterase: Negative / RBC: 2 /HPF / WBC 0 /HPF   Sq Epi: x / Non Sq Epi: 0 /HPF / Bacteria: Negative /HPF        Lactate, Blood: 2.1 mmol/L *H* (10-27-24 @ 13:25)  Sedimentation Rate, Erythrocyte: 34 mm/Hr *H* (10-27-24 @ 12:33)  Lactate, Blood: 2.3 mmol/L *H* (10-27-24 @ 11:55)    MICRO:    Urinalysis with Rflx Culture (collected 27 Oct 2024 17:00)      CARDS:  CARDIAC MARKERS ( 27 Oct 2024 12:33 )  x     / x     / x     / x     / 3.5 ng/mL        RADIOLOGY:  < from: Xray Chest 1 View-PORTABLE IMMEDIATE (Xray Chest 1 View-PORTABLE IMMEDIATE .) (10.27.24 @ 14:46) >  IMPRESSION:    Limited evaluation of the cardiomediastinal silhouette due to low lung   volumes and magnification from portable radiograph acquisition. No   consolidations. No pleural effusions. No pneumothorax. Degenerative   changes of the spine. No acute abnormalities of the soft tissues and   osseous structures.    < end of copied text >      Lines:  Central line:              Date placed:             Indication:   Intravenous Access:   NG tube:   Alexander Catheter:       Potassium, Random Urine: STAT (10-27-24 @ 20:00)  Urea Nitrogen,  Random Urine: STAT (10-27-24 @ 20:00)  Osmolality, Random Urine: STAT (10-27-24 @ 20:00)  Protein/Creatinine Ratio, Urine: STAT (10-27-24 @ 20:00)  Sodium, Random Urine: STAT (10-27-24 @ 20:00)  Urinalysis: STAT (10-27-24 @ 20:00)  Drug Screen W/PCP, Urine: STAT (10-27-24 @ 20:00)  Troponin T, High Sensitivity: AM Sched. Collection: 28-Oct-2024 05:30 (10-27-24 @ 19:07)  Phosphorus: AM Sched. Collection: 28-Oct-2024 05:30 (10-27-24 @ 17:29)  Magnesium: AM Sched. Collection: 28-Oct-2024 05:30 (10-27-24 @ 17:29)  Basic Metabolic Panel: AM Sched. Collection: 28-Oct-2024 05:30 (10-27-24 @ 17:29)  Complete Blood Count + Automated Diff: AM Sched. Collection: 28-Oct-2024 05:30 (10-27-24 @ 17:29)     HOSPITAL COURSE:  CRISTOBAL PORRAS is a 84y year old Male with a PMHx significant for HTN, HLD, CAD (last PCI 18y ago), CHF (no echo on file), CKD (Cr 1.4 in ), GERD, stroke (residual RUE weakness), anxiety/depression, brought to ED by girlfriend Tanya, for leg swelling, redness, and oozing. Patient reports that his leg swelling has been on-going for ~3 weeks. Has gotten worse in the last 3 days, to the point he felt he needed to come in. Was dropped off by his girlfriend Tanya Lucas, however, she was not at bedside to provide further collateral. Patient has an extensive history of smoking approximately 70 years. Denies any similar episodes to this, although upon chart review, patient was admitted for bilateral lower extremity swelling and left leg non-purulent cellulitis in 2021. Denies any history of heart problems, fevers, chills, abdominal pain, chest pain. Endorses shortness of breath with exertion, orthopnea, leg.     Patient is unsure exactly what medications he takes at home but when shown a list of medications from [employer], he endorses that he takes these medications. Patient does not remember the name of his pharmacy - pharmacy's in sure script show [facility] on [address]  & [facility] [address] Per [facility], his PCP appears to be Stone [facility].     SUBJECTIVE:  CRISTOBAL PORRAS is doing well this morning. Today is hospital day 1d. Pt currently c/o being cold. Denies SOB, palpitations, N/V/D.     24 Hour Events:   - No acute overnight events. Noted good urine output throughout night.    ALLERGIES:  No Known Allergies    MEDICATIONS:  STANDING MEDICATIONS  atorvastatin 40 milliGRAM(s) Oral at bedtime  ceFAZolin   IVPB 1000 milliGRAM(s) IV Intermittent every 12 hours  cloNIDine 0.1 milliGRAM(s) Oral two times a day  enoxaparin Injectable 30 milliGRAM(s) SubCutaneous every 24 hours  furosemide   Injectable 20 milliGRAM(s) IV Push daily  metoprolol succinate ER 50 milliGRAM(s) Oral every 12 hours  multivitamin 1 Tablet(s) Oral daily  pantoprazole    Tablet 40 milliGRAM(s) Oral before breakfast    PRN MEDICATIONS  acetaminophen     Tablet .. 650 milliGRAM(s) Oral every 6 hours PRN  ondansetron Injectable 4 milliGRAM(s) IV Push every 8 hours PRN    VITALS:   ICU Vital Signs Last 24 Hrs  T(C): 36.3 (28 Oct 2024 06:34), Max: 37 (27 Oct 2024 20:53)  T(F): 97.4 (28 Oct 2024 06:34), Max: 98.6 (27 Oct 2024 20:53)  HR: 71 (28 Oct 2024 06:34) (61 - 78)  BP: 122/65 (28 Oct 2024 06:34) (122/65 - 155/80)  BP(mean): --  ABP: --  ABP(mean): --  RR: 16 (28 Oct 2024 06:34) (16 - 22)  SpO2: 95% (28 Oct 2024 06:34) (92% - 97%)    O2 Parameters below as of 28 Oct 2024 06:34  Patient On (Oxygen Delivery Method): nasal cannula            PHYSICAL EXAM  General: well appearing, mumbled speech & in no acute distress    HEENT: NC/AT, sclera anicteric, conjunctiva clear, mucus membranes moist, no lymphadenopathy  Lungs: anterior and posterior lung fields clear to auscultation bilaterally, no wheezes, rhonchi or rales   Heart: regular rate and rhythm, normal S1 S2, no murmurs/rubs/gallops    Abdomen: soft, non-tender, non-distended, bowel sounds present   Extremities: b/l 3+ lower extremity edema w/ erythema and discoloration up to mid-shin, ttp, ulcerations on R leg, no purulent drainage noted, distal pulses 1+ at DP bilaterally   Skin: warm, dry skin on b/l feet, no diaphoresis,   Neuro: A&Ox3, mumbled speech, facial features symmetrical, moving all extremities spontaneously with R arm weakness and 2/5 strength to abduction and flexion, sensation intact to light touch at distal upper and lower extremities bilaterally     LABS:                        13.0   13.82 )-----------( 125      ( 27 Oct 2024 12:33 )             41.4     10    139  |  97  |  38[H]  ----------------------------<  125[H]  4.6   |  32[H]  |  2.12[H]    Ca    8.8      27 Oct 2024 12:33    TPro  7.3  /  Alb  3.3  /  TBili  1.0  /  DBili  0.2  /  AST  34  /  ALT  17  /  AlkPhos  74  10-27      Urinalysis Basic - ( 27 Oct 2024 17:00 )    Color: Yellow / Appearance: Clear / S.015 / pH: x  Gluc: x / Ketone: Negative mg/dL  / Bili: Negative / Urobili: 1.0 mg/dL   Blood: x / Protein: 300 mg/dL / Nitrite: Negative   Leuk Esterase: Negative / RBC: 2 /HPF / WBC 0 /HPF   Sq Epi: x / Non Sq Epi: 0 /HPF / Bacteria: Negative /HPF        Lactate, Blood: 2.1 mmol/L *H* (10-27-24 @ 13:25)  Sedimentation Rate, Erythrocyte: 34 mm/Hr *H* (10-27-24 @ 12:33)  Lactate, Blood: 2.3 mmol/L *H* (10-27-24 @ 11:55)    MICRO:    Urinalysis with Rflx Culture (collected 27 Oct 2024 17:00)      CARDS:  CARDIAC MARKERS ( 27 Oct 2024 12:33 )  x     / x     / x     / x     / 3.5 ng/mL        RADIOLOGY:  < from: Xray Chest 1 View-PORTABLE IMMEDIATE (Xray Chest 1 View-PORTABLE IMMEDIATE .) (10.27.24 @ 14:46) >  IMPRESSION:    Limited evaluation of the cardiomediastinal silhouette due to low lung   volumes and magnification from portable radiograph acquisition. No   consolidations. No pleural effusions. No pneumothorax. Degenerative   changes of the spine. No acute abnormalities of the soft tissues and   osseous structures.    < end of copied text >      Lines:  Central line:              Date placed:             Indication:   Intravenous Access:   NG tube:   Alexander Catheter:       Potassium, Random Urine: STAT (10-27-24 @ 20:00)  Urea Nitrogen,  Random Urine: STAT (10-27-24 @ 20:00)  Osmolality, Random Urine: STAT (10-27-24 @ 20:00)  Protein/Creatinine Ratio, Urine: STAT (10-27-24 @ 20:00)  Sodium, Random Urine: STAT (10-27-24 @ 20:00)  Urinalysis: STAT (10-27-24 @ 20:00)  Drug Screen W/PCP, Urine: STAT (10-27-24 @ 20:00)  Troponin T, High Sensitivity: AM Sched. Collection: 28-Oct-2024 05:30 (10-27-24 @ 19:07)  Phosphorus: AM Sched. Collection: 28-Oct-2024 05:30 (10-27-24 @ 17:29)  Magnesium: AM Sched. Collection: 28-Oct-2024 05:30 (10-27-24 @ 17:29)  Basic Metabolic Panel: AM Sched. Collection: 28-Oct-2024 05:30 (10-27-24 @ 17:29)  Complete Blood Count + Automated Diff: AM Sched. Collection: 28-Oct-2024 05:30 (10-27-24 @ 17:29)     HOSPITAL COURSE:  CRISTOBAL PORRAS is a 84y year old Male with a PMHx significant for HTN, HLD, CAD (last PCI 18y ago), CHF (no echo on file), CKD (Cr 1.4 in ), GERD, stroke (residual RUE weakness), anxiety/depression, brought to ED by girlfriend Tanya, for leg swelling, redness, and oozing. Patient reports that his leg swelling has been on-going for ~3 weeks. Has gotten worse in the last 3 days, to the point he felt he needed to come in. Was dropped off by his girlfriend Tanya Lucas, however, she was not at bedside to provide further collateral. Patient has an extensive history of smoking approximately 70 years. Denies any similar episodes to this, although upon chart review, patient was admitted for bilateral lower extremity swelling and left leg non-purulent cellulitis in 2021. Denies any history of heart problems, fevers, chills, abdominal pain, chest pain. Endorses shortness of breath with exertion, orthopnea, leg.     Patient is unsure exactly what medications he takes at home but when shown a list of medications from [facility], he endorses that he takes these medications. Patient does not remember the name of his pharmacy - pharmacy's in sure script show [facility] on [address] & [facility] [address] Per [facility], PCP is Stone Allen, Cardiologist Kwesi Denny.      SUBJECTIVE:  CRISTOBAL PORRAS is doing well this morning. Today is hospital day 1d. Pt currently c/o being cold. Denies SOB, palpitations, N/V/D. Pt's girlfriend, Tanya, and daughter, Maritza, were at bedside and further hx obtained from Tanya. Pt had previous vascular apt on 10/17 indicating unremarkable findings and recommended to elevate legs and use SCD; however, pt had not been complying for past couple months. As per Tanya, pt had no previous dx of CHF or CKD.     24 Hour Events:   - No acute overnight events. Noted good urine output throughout night.    ALLERGIES:  No Known Allergies    MEDICATIONS:  STANDING MEDICATIONS  atorvastatin 40 milliGRAM(s) Oral at bedtime  ceFAZolin   IVPB 1000 milliGRAM(s) IV Intermittent every 12 hours  cloNIDine 0.1 milliGRAM(s) Oral two times a day  enoxaparin Injectable 30 milliGRAM(s) SubCutaneous every 24 hours  furosemide   Injectable 20 milliGRAM(s) IV Push daily  metoprolol succinate ER 50 milliGRAM(s) Oral every 12 hours  multivitamin 1 Tablet(s) Oral daily  pantoprazole    Tablet 40 milliGRAM(s) Oral before breakfast    PRN MEDICATIONS  acetaminophen     Tablet .. 650 milliGRAM(s) Oral every 6 hours PRN  ondansetron Injectable 4 milliGRAM(s) IV Push every 8 hours PRN    VITALS:   ICU Vital Signs Last 24 Hrs  T(C): 36.3 (28 Oct 2024 06:34), Max: 37 (27 Oct 2024 20:53)  T(F): 97.4 (28 Oct 2024 06:34), Max: 98.6 (27 Oct 2024 20:53)  HR: 71 (28 Oct 2024 06:34) (61 - 78)  BP: 122/65 (28 Oct 2024 06:34) (122/65 - 155/80)  BP(mean): --  ABP: --  ABP(mean): --  RR: 16 (28 Oct 2024 06:34) (16 - 22)  SpO2: 95% (28 Oct 2024 06:34) (92% - 97%)    O2 Parameters below as of 28 Oct 2024 06:34  Patient On (Oxygen Delivery Method): nasal cannula            PHYSICAL EXAM  General: well appearing, mumbled speech & in no acute distress    HEENT: NC/AT, sclera anicteric, conjunctiva clear, mucus membranes moist, no lymphadenopathy  Lungs: anterior and posterior lung fields clear to auscultation bilaterally, no wheezes, rhonchi or rales   Heart: regular rate and rhythm, normal S1 S2, no murmurs/rubs/gallops    Abdomen: soft, non-tender, non-distended, bowel sounds present   Extremities: b/l 3+ lower extremity edema w/ erythema and hyperpigmented discoloration up to mid-shin, ttp, ulcerations on R lateral leg, no purulent drainage noted, distal pulses 1+ at DP bilaterally   Skin: warm, dry skin on b/l feet, no diaphoresis  Neuro: A&Ox3, mumbled speech, facial features symmetrical, moving all extremities spontaneously with R arm weakness and 2/5 strength to abduction and flexion, sensation intact to light touch at distal upper and lower extremities bilaterally     LABS:                        13.0   13.82 )-----------( 125      ( 27 Oct 2024 12:33 )             41.4     10-27    139  |  97  |  38[H]  ----------------------------<  125[H]  4.6   |  32[H]  |  2.12[H]    Ca    8.8      27 Oct 2024 12:33    TPro  7.3  /  Alb  3.3  /  TBili  1.0  /  DBili  0.2  /  AST  34  /  ALT  17  /  AlkPhos  74  10-27      Urinalysis Basic - ( 27 Oct 2024 17:00 )    Color: Yellow / Appearance: Clear / S.015 / pH: x  Gluc: x / Ketone: Negative mg/dL  / Bili: Negative / Urobili: 1.0 mg/dL   Blood: x / Protein: 300 mg/dL / Nitrite: Negative   Leuk Esterase: Negative / RBC: 2 /HPF / WBC 0 /HPF   Sq Epi: x / Non Sq Epi: 0 /HPF / Bacteria: Negative /HPF        Lactate, Blood: 2.1 mmol/L *H* (10-27-24 @ 13:25)  Sedimentation Rate, Erythrocyte: 34 mm/Hr *H* (10-27-24 @ 12:33)  Lactate, Blood: 2.3 mmol/L *H* (10-27-24 @ 11:55)    MICRO:    Urinalysis with Rflx Culture (collected 27 Oct 2024 17:00)      CARDS:  CARDIAC MARKERS ( 27 Oct 2024 12:33 )  x     / x     / x     / x     / 3.5 ng/mL        RADIOLOGY:  < from: Xray Chest 1 View-PORTABLE IMMEDIATE (Xray Chest 1 View-PORTABLE IMMEDIATE .) (10.27.24 @ 14:46) >  IMPRESSION:    Limited evaluation of the cardiomediastinal silhouette due to low lung   volumes and magnification from portable radiograph acquisition. No   consolidations. No pleural effusions. No pneumothorax. Degenerative   changes of the spine. No acute abnormalities of the soft tissues and   osseous structures.    < end of copied text >      Lines:  Central line:              Date placed:             Indication:   Intravenous Access:   NG tube:   Alexander Catheter:       Potassium, Random Urine: STAT (10-27-24 @ 20:00)  Urea Nitrogen,  Random Urine: STAT (10-27-24 @ 20:00)  Osmolality, Random Urine: STAT (10-27-24 @ 20:00)  Protein/Creatinine Ratio, Urine: STAT (10-27-24 @ 20:00)  Sodium, Random Urine: STAT (10-27-24 @ 20:00)  Urinalysis: STAT (10-27-24 @ 20:00)  Drug Screen W/PCP, Urine: STAT (10-27-24 @ 20:00)  Troponin T, High Sensitivity: AM Sched. Collection: 28-Oct-2024 05:30 (10-27-24 @ 19:07)  Phosphorus: AM Sched. Collection: 28-Oct-2024 05:30 (10-27-24 @ 17:29)  Magnesium: AM Sched. Collection: 28-Oct-2024 05:30 (10-27-24 @ 17:29)  Basic Metabolic Panel: AM Sched. Collection: 28-Oct-2024 05:30 (10-27-24 @ 17:29)  Complete Blood Count + Automated Diff: AM Sched. Collection: 28-Oct-2024 05:30 (10-27-24 @ 17:29)     HOSPITAL COURSE:  CRISTOBAL PORRAS is a 84y year old Male with a PMHx significant for HTN, HLD, CAD (last PCI 18y ago), CHF (no echo on file), CKD (Cr 1.4 in ), GERD, stroke (residual RUE weakness), anxiety/depression, brought to ED by girlfriend Tanya, for leg swelling, redness, and oozing. Patient reports that his leg swelling has been on-going for ~3 weeks. Has gotten worse in the last 3 days, to the point he felt he needed to come in. Was dropped off by his girlfriend Tanya Lucas, however, she was not at bedside to provide further collateral. Patient has an extensive history of smoking approximately 70 years. Denies any similar episodes to this, although upon chart review, patient was admitted for bilateral lower extremity swelling and left leg non-purulent cellulitis in 2021. Denies any history of heart problems, fevers, chills, abdominal pain, chest pain. Endorses shortness of breath with exertion, orthopnea, leg.     Patient is unsure exactly what medications he takes at home but when shown a list of medications from [facility], he endorses that he takes these medications. Patient does not remember the name of his pharmacy - pharmacy's in sure script show [facility] on [address] [facility] [address] Per [employer], PCP is Stone Allen, Cardiologist Kwesi Denny.      SUBJECTIVE:  CRISTOBAL PORRAS is doing well this morning. Today is hospital day 1d. Pt currently c/o being cold. Denies SOB, palpitations, N/V/D. Pt's girlfriend, Tanya, and daughter, Maritza, were at bedside and further hx obtained from Tanya. Pt had previous vascular apt on 10/17 indicating unremarkable findings and recommended to elevate legs and use SCD; however, pt had not been complying for past couple months. As per Tanya, pt had no previous dx of CHF or CKD.     24 Hour Events:   - No acute overnight events. Noted good urine output throughout night.    ALLERGIES:  No Known Allergies    MEDICATIONS:  STANDING MEDICATIONS  atorvastatin 40 milliGRAM(s) Oral at bedtime  ceFAZolin   IVPB 1000 milliGRAM(s) IV Intermittent every 12 hours  cloNIDine 0.1 milliGRAM(s) Oral two times a day  enoxaparin Injectable 30 milliGRAM(s) SubCutaneous every 24 hours  furosemide   Injectable 20 milliGRAM(s) IV Push daily  metoprolol succinate ER 50 milliGRAM(s) Oral every 12 hours  multivitamin 1 Tablet(s) Oral daily  pantoprazole    Tablet 40 milliGRAM(s) Oral before breakfast    PRN MEDICATIONS  acetaminophen     Tablet .. 650 milliGRAM(s) Oral every 6 hours PRN  ondansetron Injectable 4 milliGRAM(s) IV Push every 8 hours PRN    VITALS:   ICU Vital Signs Last 24 Hrs  T(C): 36.3 (28 Oct 2024 06:34), Max: 37 (27 Oct 2024 20:53)  T(F): 97.4 (28 Oct 2024 06:34), Max: 98.6 (27 Oct 2024 20:53)  HR: 71 (28 Oct 2024 06:34) (61 - 78)  BP: 122/65 (28 Oct 2024 06:34) (122/65 - 155/80)  BP(mean): --  ABP: --  ABP(mean): --  RR: 16 (28 Oct 2024 06:34) (16 - 22)  SpO2: 95% (28 Oct 2024 06:34) (92% - 97%)    O2 Parameters below as of 28 Oct 2024 06:34  Patient On (Oxygen Delivery Method): nasal cannula            PHYSICAL EXAM  General: well appearing, mumbled speech & in no acute distress    HEENT: NC/AT, sclera anicteric, conjunctiva clear, mucus membranes moist, no lymphadenopathy  Lungs: anterior and posterior lung fields clear to auscultation bilaterally, no wheezes, rhonchi or rales   Heart: regular rate and rhythm, normal S1 S2, no murmurs/rubs/gallops    Abdomen: soft, non-tender, non-distended, bowel sounds present   Extremities: b/l 3+ lower extremity edema w/ erythema and hyperpigmented discoloration up to mid-shin, ttp, ulcerations on R lateral leg, no purulent drainage noted, distal pulses 1+ at DP bilaterally   Skin: warm, dry skin on b/l feet, no diaphoresis  Neuro: A&Ox3, mumbled speech, facial features symmetrical, moving all extremities spontaneously with R arm weakness and 2/5 strength to abduction and flexion, sensation intact to light touch at distal upper and lower extremities bilaterally     LABS:                        13.0   13.82 )-----------( 125      ( 27 Oct 2024 12:33 )             41.4     10-27    139  |  97  |  38[H]  ----------------------------<  125[H]  4.6   |  32[H]  |  2.12[H]    Ca    8.8      27 Oct 2024 12:33    TPro  7.3  /  Alb  3.3  /  TBili  1.0  /  DBili  0.2  /  AST  34  /  ALT  17  /  AlkPhos  74  10-27      Urinalysis Basic - ( 27 Oct 2024 17:00 )    Color: Yellow / Appearance: Clear / S.015 / pH: x  Gluc: x / Ketone: Negative mg/dL  / Bili: Negative / Urobili: 1.0 mg/dL   Blood: x / Protein: 300 mg/dL / Nitrite: Negative   Leuk Esterase: Negative / RBC: 2 /HPF / WBC 0 /HPF   Sq Epi: x / Non Sq Epi: 0 /HPF / Bacteria: Negative /HPF        Lactate, Blood: 2.1 mmol/L *H* (10-27-24 @ 13:25)  Sedimentation Rate, Erythrocyte: 34 mm/Hr *H* (10-27-24 @ 12:33)  Lactate, Blood: 2.3 mmol/L *H* (10-27-24 @ 11:55)    MICRO:    Urinalysis with Rflx Culture (collected 27 Oct 2024 17:00)      CARDS:  CARDIAC MARKERS ( 27 Oct 2024 12:33 )  x     / x     / x     / x     / 3.5 ng/mL        RADIOLOGY:  < from: Xray Chest 1 View-PORTABLE IMMEDIATE (Xray Chest 1 View-PORTABLE IMMEDIATE .) (10.27.24 @ 14:46) >  IMPRESSION:    Limited evaluation of the cardiomediastinal silhouette due to low lung   volumes and magnification from portable radiograph acquisition. No   consolidations. No pleural effusions. No pneumothorax. Degenerative   changes of the spine. No acute abnormalities of the soft tissues and   osseous structures.    < end of copied text >      Lines:  Central line:              Date placed:             Indication:   Intravenous Access:   NG tube:   Alexander Catheter:       Potassium, Random Urine: STAT (10-27-24 @ 20:00)  Urea Nitrogen,  Random Urine: STAT (10-27-24 @ 20:00)  Osmolality, Random Urine: STAT (10-27-24 @ 20:00)  Protein/Creatinine Ratio, Urine: STAT (10-27-24 @ 20:00)  Sodium, Random Urine: STAT (10-27-24 @ 20:00)  Urinalysis: STAT (10-27-24 @ 20:00)  Drug Screen W/PCP, Urine: STAT (10-27-24 @ 20:00)  Troponin T, High Sensitivity: AM Sched. Collection: 28-Oct-2024 05:30 (10-27-24 @ 19:07)  Phosphorus: AM Sched. Collection: 28-Oct-2024 05:30 (10-27-24 @ 17:29)  Magnesium: AM Sched. Collection: 28-Oct-2024 05:30 (10-27-24 @ 17:29)  Basic Metabolic Panel: AM Sched. Collection: 28-Oct-2024 05:30 (10-27-24 @ 17:29)  Complete Blood Count + Automated Diff: AM Sched. Collection: 28-Oct-2024 05:30 (10-27-24 @ 17:29)     HOSPITAL COURSE:  CRISTOBAL PORRAS is a 84y year old Male with a PMHx significant for HTN, HLD, CAD (last PCI 18y ago), CHF (no echo on file), CKD (Cr 1.4 in ), GERD, stroke (residual RUE weakness), anxiety/depression, brought to ED by girlfriend Tanya, for leg swelling, redness, and oozing. Patient reports that his leg swelling has been on-going for ~3 weeks. Has gotten worse in the last 3 days, to the point he felt he needed to come in. Was dropped off by his girlfriend Tanya Lucas, however, she was not at bedside to provide further collateral. Patient has an extensive history of smoking approximately 70 years. Denies any similar episodes to this, although upon chart review, patient was admitted for bilateral lower extremity swelling and left leg non-purulent cellulitis in 2021. Denies any history of heart problems, fevers, chills, abdominal pain, chest pain. Endorses shortness of breath with exertion, orthopnea, leg.     Patient is unsure exactly what medications he takes at home but when shown a list of medications from [facility], he endorses that he takes these medications. Patient does not remember the name of his pharmacy - pharmacy's in sure script show [facility] on [address] [facility] [address] Per [employer], PCP is Stone Allen, Cardiologist Kwesi Denny.      SUBJECTIVE:  CRISTOBAL PORRAS is doing well this morning. Today is hospital day 1d. Pt currently c/o being cold. Denies SOB, palpitations, N/V/D. Pt's girlfriend, Tanya, and daughter, Maritza, were at bedside and further hx obtained from Tanya. Pt had previous vascular apt on 10/17 indicating unremarkable findings and recommended to elevate legs and use SCD; however, pt had not been complying for past couple months. As per Tanya, pt had no previous dx of CHF or CKD.     24 Hour Events:   - No acute overnight events. Noted good urine output throughout night.    ALLERGIES:  No Known Allergies    MEDICATIONS:  STANDING MEDICATIONS  atorvastatin 40 milliGRAM(s) Oral at bedtime  ceFAZolin   IVPB 1000 milliGRAM(s) IV Intermittent every 12 hours  cloNIDine 0.1 milliGRAM(s) Oral two times a day  enoxaparin Injectable 30 milliGRAM(s) SubCutaneous every 24 hours  furosemide   Injectable 20 milliGRAM(s) IV Push daily  metoprolol succinate ER 50 milliGRAM(s) Oral every 12 hours  multivitamin 1 Tablet(s) Oral daily  pantoprazole    Tablet 40 milliGRAM(s) Oral before breakfast    PRN MEDICATIONS  acetaminophen     Tablet .. 650 milliGRAM(s) Oral every 6 hours PRN  ondansetron Injectable 4 milliGRAM(s) IV Push every 8 hours PRN    VITALS:   ICU Vital Signs Last 24 Hrs  T(C): 36.3 (28 Oct 2024 06:34), Max: 37 (27 Oct 2024 20:53)  T(F): 97.4 (28 Oct 2024 06:34), Max: 98.6 (27 Oct 2024 20:53)  HR: 71 (28 Oct 2024 06:34) (61 - 78)  BP: 122/65 (28 Oct 2024 06:34) (122/65 - 155/80)  BP(mean): --  ABP: --  ABP(mean): --  RR: 16 (28 Oct 2024 06:34) (16 - 22)  SpO2: 95% (28 Oct 2024 06:34) (92% - 97%)    O2 Parameters below as of 28 Oct 2024 06:34  Patient On (Oxygen Delivery Method): nasal cannula      PHYSICAL EXAM  General: well appearing, mumbled speech & in no acute distress    HEENT: NC/AT, sclera anicteric, conjunctiva clear, mucus membranes moist, no lymphadenopathy  Lungs: anterior and posterior lung fields clear to auscultation bilaterally, no wheezes, rhonchi or rales   Heart: regular rate and rhythm, normal S1 S2, no murmurs/rubs/gallops    Abdomen: soft, non-tender, non-distended, bowel sounds present   Extremities: b/l 3+ lower extremity edema w/ erythema and hyperpigmented discoloration up to mid-shin, ttp, ulcerations on R lateral leg, no purulent drainage noted, distal pulses 1+ at DP bilaterally   Skin: warm, dry skin on b/l feet, no diaphoresis  Neuro: A&Ox3, mumbled speech, facial features symmetrical, moving all extremities spontaneously with R arm weakness and 2/5 strength to abduction and flexion, sensation intact to light touch at distal upper and lower extremities bilaterally     LABS:                        13.0   13.82 )-----------( 125      ( 27 Oct 2024 12:33 )             41.4     10-27    139  |  97  |  38[H]  ----------------------------<  125[H]  4.6   |  32[H]  |  2.12[H]    Ca    8.8      27 Oct 2024 12:33    TPro  7.3  /  Alb  3.3  /  TBili  1.0  /  DBili  0.2  /  AST  34  /  ALT  17  /  AlkPhos  74  10-27      Urinalysis Basic - ( 27 Oct 2024 17:00 )    Color: Yellow / Appearance: Clear / S.015 / pH: x  Gluc: x / Ketone: Negative mg/dL  / Bili: Negative / Urobili: 1.0 mg/dL   Blood: x / Protein: 300 mg/dL / Nitrite: Negative   Leuk Esterase: Negative / RBC: 2 /HPF / WBC 0 /HPF   Sq Epi: x / Non Sq Epi: 0 /HPF / Bacteria: Negative /HPF      Lactate, Blood: 2.1 mmol/L *H* (10-27-24 @ 13:25)  Sedimentation Rate, Erythrocyte: 34 mm/Hr *H* (10-27-24 @ 12:33)  Lactate, Blood: 2.3 mmol/L *H* (10-27-24 @ 11:55)    MICRO:  Urinalysis with Rflx Culture (collected 27 Oct 2024 17:00)  Blood culture - preliminary results -- gram positive cocci in clusters; staph epidermidis w/ methicillin resistance       CARDS:  CARDIAC MARKERS ( 27 Oct 2024 12:33 )  x     / x     / x     / x     / 3.5 ng/mL      RADIOLOGY:  < from: Xray Chest 1 View-PORTABLE IMMEDIATE (Xray Chest 1 View-PORTABLE IMMEDIATE .) (10.27.24 @ 14:46) >  IMPRESSION:    Limited evaluation of the cardiomediastinal silhouette due to low lung   volumes and magnification from portable radiograph acquisition. No   consolidations. No pleural effusions. No pneumothorax. Degenerative   changes of the spine. No acute abnormalities of the soft tissues and   osseous structures.    < end of copied text >

## 2024-10-28 NOTE — PROGRESS NOTE ADULT - PROBLEM SELECTOR PLAN 6
Pt w/ hx of CAD (last cardiac cath 18yrs ago). EKG w/ new T wave inversions and previous RBBB, left axis deviation. Denies CP, SOB, palpitations.  - c/w asa/lipitor Pt w/ hx of CAD (last cardiac cath 18yrs ago). EKG w/ new T wave inversions (10/27) and previous RBBB, left axis deviation (2021). Denies CP, SOB, palpitations.  - c/w asa/lipitor Pt w/ hx of CAD (last cardiac cath 18yrs ago). EKG w/ new T wave inversions (10/27) and previous RBBB, left axis deviation (2021). Denies CP, SOB, palpitations.  - c/w [id number]        - patient says he is taking 325mg aspirin otc at home; will need conversation regarding aspirin prior to discharge  - c/w atorvastatin 40mg

## 2024-10-28 NOTE — CONSULT NOTE ADULT - ASSESSMENT
84M w/ PMH of HTN, HLD, CAD, HF (unknown phenotype), CKD, GERD, anxiety/depression, brought to ED by family, for 1w history of leg swelling, redness and blisters found to have cellulitis, ED course c/b respiratory distress after IV fluids thought to be HF exacerbation improving after IV lasix. ICU consulted for tele monitoring iso HF exacerbation.       Review of Studies:      Outpatient Providers:     Home Medications:    Recommendations:    #XX  -  -  -      #XX  -  -  -      Recommendations above are preliminary pending discussion with an attending cardiologist  Plan was discussed with primary team  We'll continue to follow, thank you for the consultation      Tino Castro PGY6 CVD Fellow  Cardiology Consult Pager: [phone]  CCU Pager: [phone]  Heart Failure Pager: [phone]       Aravind Jiang is an 84M with PMHx of HTN, HLD, CAD (last PCI 18y ago), CHF (no echo on file), CKD (baseline Cr 1.6), GERD, stroke (residual RUE weakness), anxiety/depression who was brought to ED by girlfriend Tanya for leg swelling, redness, and oozing. Patient reports he has had bilateral leg swelling for approximately 3 weeks that has worsened in the last 3 days to the point he felt he needed to come in. Patient denies similar episodes in the past, although upon chart review, patient was admitted for bilateral lower extremity swelling and left leg non-purulent cellulitis in 9/2021. Patient reports smoking for approximately 70 years, endorses shortness of breath with exertion, orthopnea, leg swelling, and denies any history of heart problems, fevers, chills, abdominal pain, chest pain. Patient was admitted on 10/27/24 and started on lasix 20mg QD and Cefazolin 1g Q12H. EKG showed t wave inversions in v4-6 (new for v4-5). O/n troponin downtrended to 76>69 and had good UOP throughout the night. On 10/28 Aspirin 81mg was restarted and Cardiology was consulted for c/f Acute Decompensated Heart Failure.    Review of Studies:  Telemetry: NSR  10/28/24 ECG: NSR w/ LAD, RBBB, and LVH by aVL criteria  Echo: pending    Outpatient Providers: PCP Dr. Stone Allen    Home Medications:  Metoprolol succinate ER 50mg BID  HCTZ 12.5mg QD  Clonidine 0.1mg BID  Atorvastatin 40mg HS  Fosinopril 40mg QD    Recommendations:    #Acute Decompensated Heart Failure  - Please increase IV Lasix from 20mg QD to 40mg BID  - Please continue to monitor volume status, Cr, and BMP for electrolyte changes s/p diuresis  - Echo ordered to evaluate for structural heart disease    #Acute Kidney Injury  - Baseline Cr 1.6, now 2.11  - Please continue to monitor Cr and BMP    Recommendations above are preliminary pending discussion with an attending cardiologist  Plan was discussed with primary team  We'll continue to follow, thank you for the consultation      Tino Castro PGY6 CVD Fellow  Cardiology Consult Pager: [phone]  CCU Pager: [phone]  Heart Failure Pager: [phone]       84M with PMHx of HTN, HLD, CAD (last PCI 18y ago), ?CHF, CKD (baseline Cr 1.6), GERD, stroke (residual RUE weakness), anxiety/depression who was brought to ED by family for b/l LE swelling, redness, and oozing-he was admitted to [facility] for bilateral lower extremity swelling and non-purulent cellulitis. He was found ot be in clinical acute decompensate HF pending TTE for which cardiology was consulted for.     Review of Studies:  10/28/24 ECG: NSR w/ LAD, RBBB, and LVH by aVL criteria    Outpatient Providers: PCP Dr. Stone Allen    Home Medications: Metoprolol succinate ER 50mg BID, HCTZ 12.5mg QD, Clonidine 0.1mg BID, Atorvastatin 40mg HS, Fosinopril 40mg QD    Recommendations:    #ADHF  Etiology: ?ICM  Rhythm: SR  Hemodynamics: normotensive   Perfusion: WWP making urine  GDMT:  C/w Toprol 50mg XL BID holding for HR<60 and/or SBP <110 (please obtain med rec to clarify frequency)  Will hold on vasodilators given KALA  Diuretics: Overloaded on exam, please begin lasix 40mg IVP VID,  strict I/Os and daily weights, please  Device: n/a  AT: n/a   **Please obtain TTE w/Spectral dopplers    ?CAD  -C/w ASA 81mg QD  -C/w atorvastatin 40mg QD  -Please obtain collateral cardiac hx from PCP (LHC/RHC, TTE, stress tests)  -Please add on an a1c and lipid panel    #HTN  -Holding ACEI in the setting of KALA  -If persistently hypertensive (SBP above 140s), can start hydralazine 25mg TID holding for SBP <110    Recommendations above are preliminary pending discussion with an attending cardiologist  Plan was discussed with primary team  We'll continue to follow, thank you for the consultation      Tino Castro PGY6 CVD Fellow  Cardiology Consult Pager: [phone]  CCU Pager: [phone]  Heart Failure Pager: [phone]       84M with PMHx of HTN, HLD, CAD (last PCI 18y ago), ?CHF, CKD (baseline Cr 1.6), GERD, stroke (residual RUE weakness), anxiety/depression who was brought to ED by family for b/l LE swelling, redness, and oozing-he was admitted to [facility] for bilateral lower extremity swelling and non-purulent cellulitis. He was found to be in clinical acute decompensated HF pending TTE for which cardiology was consulted for.     Review of Studies:  10/28/24 ECG: NSR w/ LAD, RBBB, and LVH by aVL criteria    Outpatient Providers: PCP Dr. Stone Allen    Home Medications: Metoprolol succinate ER 50mg BID, HCTZ 12.5mg QD, Clonidine 0.1mg BID, Atorvastatin 40mg HS, Fosinopril 40mg QD    Recommendations:    #ADHF  Etiology: ?ICM  Rhythm: SR  Hemodynamics: normotensive   Perfusion: WWP making urine  GDMT:  C/w Toprol 50mg XL BID holding for HR<60 and/or SBP <110 (please obtain med rec to clarify frequency)  Will hold on vasodilators given KALA  Pending phenotyping of HF via TTE for further GDMT recs  Diuretics: Overloaded on exam, please begin lasix 40mg IVP VID,  strict I/Os and daily weights, please  Device: n/a  AT: n/a   **Please obtain TTE w/Spectral dopplers    ?CAD  -C/w ASA 81mg QD  -C/w atorvastatin 40mg QD  -Please obtain collateral cardiac hx from PCP (LHC/RHC, TTE, stress tests)  -Please add on an a1c and lipid panel    #HTN  -Holding ACEI in the setting of KALA  -If persistently hypertensive (SBP above 140s), can start hydralazine 25mg TID holding for SBP <110    Recommendations above are preliminary pending discussion with an attending cardiologist  Plan was discussed with primary team  We'll continue to follow, thank you for the consultation      Tino Castro PGY6 CVD Fellow  Cardiology Consult Pager: [phone]  CCU Pager: [phone]  Heart Failure Pager: [phone]

## 2024-10-28 NOTE — CONSULT NOTE ADULT - SUBJECTIVE AND OBJECTIVE BOX
NEPHROLOGY SERVICE INITIAL CONSULT NOTE    HPI:  66 history of  CKD stage III baseline creatinine of 1.4, hypertension, CHF,  hyperlipidemia ,CAD, old stroke admitted for CHF exacerbation with bilateral lower limbs cellulitis, , nephrology consulted for KALA on CKD Creat 2.1      BNP 8629    PMHx significant for HTN, HLD, CAD (last PCI 18y ago), CHF (no echo on file), CKD (Cr 1.4 in ), GERD, stroke (residual RUE weakness)  Current meds:  metoprolol 50 mg daily  hctz 12.5 mg daily  slow k 20 angeli daily  doxepin 10 mg daily  clonidine 1mg bid  atorvastatin 40 mg daily  fosinopril 40 mg daily  gabapentin  600 mg nocte    ED COURSE  Vitals: T(C): 36.3 (10-27-24 @ 17:14), Max: 36.4 (10-27-24 @ 14:25); HR: 61 (10-27-24 @ 17:14) (61 - 74); BP: 148/94 (10-27-24 @ 17:14) (130/81 - 148/94); RR: 20 (10-27-24 @ 17:14) (18 - 22); SpO2: 96% (10-27-24 @ 17:14) (92% - 97%)  Labs: WBC 13.82 (89.5% neutrophils), lactate 2.3, 2.1 s/p 500cc, creatinine 2.12, .7, ESR 34, BNP 8629, trop 76  EKG: normal sinus w/ left axis deviation, RBBB (seen on EKG ), LVH, T wave inversions in V4/V5/V6 (V4/V5 not seen in )  Imaging: CXR wet read showing increased pulmonary vasculature but not pleural effusions or consolidations  Interventions: 500cc 0.9% NS, 20mg lasix IV , 1000mg vanc , 3.375g zosyn  Consults: ICU team for likely plash pulmonary edema 2/2 500cc fluid bolus   (27 Oct 2024 17:21)      ADDITIONAL NEPHROLOGY HPI:    REVIEW OF SYSTEMS:   Otherwise negative except as specified in HPI    PAST MEDICAL AND SURGICAL HISTORY:   PAST MEDICAL & SURGICAL HISTORY:  HTN (hypertension)      Arthritis      GERD (gastroesophageal reflux disease)      Chronic CHF      CAD (coronary artery disease)      Constipation      Depression      Chronic renal insufficiency      Cellulitis, leg      HTN (hypertension)      HLD (hyperlipidemia)      GERD (gastroesophageal reflux disease)      Chronic systolic congestive heart failure      CAD (coronary artery disease)      History of BPH      Constipation      Depression      Anxiety      Chronic renal insufficiency      H/O hernia repair          FAMILY HISTORY:  FAMILY HISTORY:  No pertinent family history in first degree relatives        Otherwise Noncontributory to current admission    SOCIAL HISTORY:  Tobacco use: Denies  EtOH use: Denies  Illicit drug use: Denies    HOME MEDICATIONS:      ACTIVE MEDICATIONS:  MEDICATIONS  (STANDING):  aspirin  chewable 81 milliGRAM(s) Oral daily  atorvastatin 40 milliGRAM(s) Oral at bedtime  cloNIDine 0.1 milliGRAM(s) Oral two times a day  enoxaparin Injectable 30 milliGRAM(s) SubCutaneous every 24 hours  metoprolol succinate ER 50 milliGRAM(s) Oral every 12 hours  multivitamin 1 Tablet(s) Oral daily  pantoprazole    Tablet 40 milliGRAM(s) Oral before breakfast  vancomycin  IVPB 1000 milliGRAM(s) IV Intermittent every 24 hours    MEDICATIONS  (PRN):  acetaminophen     Tablet .. 650 milliGRAM(s) Oral every 6 hours PRN Temp greater or equal to 38C (100.4F), Mild Pain (1 - 3)  ondansetron Injectable 4 milliGRAM(s) IV Push every 8 hours PRN Nausea and/or Vomiting      ALLERGIES:  Allergies    No Known Allergies    Intolerances        VITAL SIGNS:  Vital Signs Last 24 Hrs  T(C): 36.3 (28 Oct 2024 09:56), Max: 37 (27 Oct 2024 20:53)  T(F): 97.4 (28 Oct 2024 09:56), Max: 98.6 (27 Oct 2024 20:53)  HR: 68 (28 Oct 2024 12:42) (61 - 78)  BP: 149/79 (28 Oct 2024 12:42) (122/65 - 155/80)  BP(mean): --  RR: 18 (28 Oct 2024 12:42) (16 - 20)  SpO2: 96% (28 Oct 2024 12:42) (95% - 97%)    Parameters below as of 28 Oct 2024 12:42  Patient On (Oxygen Delivery Method): nasal cannula  O2 Flow (L/min): 2      10-27-24 @ 07:01  -  10-28-24 @ 07:00  --------------------------------------------------------  IN:  Total IN: 0 mL    OUT:    Voided (mL): 400 mL  Total OUT: 400 mL    Total NET: -400 mL      10-28-24 @ 07:01  -  10-28-24 @ 15:54  --------------------------------------------------------  IN:  Total IN: 0 mL    OUT:    Voided (mL): 200 mL  Total OUT: 200 mL    Total NET: -200 mL          PHYSICAL EXAM:  Constitutional: comfortably in bed; NAD  Head: NC/AT  Eyes: PERRL, EOMI, anicteric sclera  Neck: supple; no JVD  Respiratory: CTA B/L; no W/R/R, no retractions  Cardiac: +S1/S2; RRR; no M/R/G; PMI non-displaced  Gastrointestinal: abdomen soft, NT/ND; no rebound or guarding; +BSx4  Extremities: WWP, no clubbing or cyanosis; b/l pitting edema lower limbs  Neurologic: awakae, answers questions appropriately  Access:    LABS:                        13.0   9.24  )-----------( 120      ( 28 Oct 2024 05:30 )             41.1     10-28    144  |  101  |  39[H]  ----------------------------<  76  4.2   |  37[H]  |  2.11[H]    Ca    8.8      28 Oct 2024 05:30  Phos  4.8     10-28  Mg     1.8     10-28    TPro  7.3  /  Alb  3.3  /  TBili  1.0  /  DBili  0.2  /  AST  34  /  ALT  17  /  AlkPhos  74  10      Urinalysis Basic - ( 28 Oct 2024 11:26 )    Color: Yellow / Appearance: Clear / S.016 / pH: x  Gluc: x / Ketone: Negative mg/dL  / Bili: Negative / Urobili: 0.2 mg/dL   Blood: x / Protein: 300 mg/dL / Nitrite: Negative   Leuk Esterase: Negative / RBC: 3 /HPF / WBC 2 /HPF   Sq Epi: x / Non Sq Epi: 2 /HPF / Bacteria: Negative /HPF      CARDIAC MARKERS ( 27 Oct 2024 12:33 )  x     / x     / x     / x     / 3.5 ng/mL      CAPILLARY BLOOD GLUCOSE              RADIOLOGY & ADDITIONAL TESTS: Reviewed.

## 2024-10-28 NOTE — CONSULT NOTE ADULT - ASSESSMENT
66 history of  CKD stage III baseline creatinine of 1.4, hypertension, CHF,  hyperlipidemia ,CAD, old stroke admitted for CHF exacerbation with bilateral lower limbs cellulitis, , nephrology consulted for KALA on CKD Creat 2.1        Assessment  Non oliguric KALA on CKD likely  Cardiorenal syndrome(pre-renal)    - Strict I/O chart  -Daily I/O chart  -Daily weight  -For Renal sono  -trend BMP daily  -fluid restriction 1.2 l/d  -For ECHO  -Cardiology review    Sub-nephrotic range proteinuria likely secondary to KALA ? tubular injury: nephrotic syndrome unlikely  UPCR <3.5  -Pt will benefit from ACE I or ARB after the acute phase  -treatment of underlying course    Pt needs cardiology consult  will follow

## 2024-10-28 NOTE — PROGRESS NOTE ADULT - PROBLEM SELECTOR PLAN 2
Patient with a transiently low O2 saturation on room air after receiving 500cc bolus of normal saline in the ED. In the setting of bilateral lower extremity edema and a likely hx of CHF. Patient also reports 70 pack years of smoking. No wheezes or crackles on exam for admission. Most likely flash pulmonary edema in the setting of heart failure.    - S/p 20mg IV lasix w/ improvement in hypoxemia  - ICU team consulted for flash pulmonary edema  - c/w diuresis below  - strict I/Os   - Wean O2 as tolerated Patient with a transiently low O2 saturation on room air after receiving 500cc bolus of normal saline in the ED. In the setting of bilateral lower extremity edema and a likely hx of CHF. Patient also reports 70 pack years of smoking. No wheezes or crackles on exam for admission. Most likely flash pulmonary edema in the setting of heart failure.    - S/p 20mg IV lasix w/ improvement in hypoxemia  - f/u ICU consult for flash pulmonary edema  - c/w diuresis below  - strict I/Os   - Wean O2 as tolerated Patient with a transiently low O2 saturation on room air after receiving 500cc bolus of normal saline in the ED. In the setting of bilateral lower extremity edema and a likely hx of CHF. Patient also reports 70 pack years of smoking. No wheezes or crackles on exam for admission. Most likely flash pulmonary edema in the setting of heart failure.    - S/p 20mg IV lasix w/ improvement in hypoxemia, now resolved   - c/w diuresis below  - strict I/Os   - Wean O2 as tolerated (95% o2 sat on 2L NC) Patient with a transiently low O2 saturation on room air after receiving 500cc bolus of normal saline in the ED. In the setting of bilateral lower extremity edema and a likely hx of CHF. Patient also reports 70 pack years of smoking. No wheezes or crackles on exam for admission. Most likely flash pulmonary edema in the setting of heart failure.    - S/p 20mg IV lasix w/ improvement in hypoxemia, now resolved   - c/w diuresis below  - strict I/Os   - Wean O2 as tolerated (currently 95% o2 sat on 2L NC) Patient with a transiently low O2 saturation on room air after receiving 500cc bolus of normal saline in the ED s/p 20mg IV lasix w/ improvement in hypoxemia. In the setting of bilateral lower extremity edema and a likely hx of CHF. Patient also reports 70 pack years of smoking. No wheezes or crackles on exam for admission. Most likely flash pulmonary edema in the setting of heart failure.    - now resolving   - c/w diuresis below  - strict I/Os   - Wean O2 as tolerated (currently 95% o2 sat on 2L NC) Patient with a transiently low O2 saturation on room air after receiving 500cc bolus of normal saline in the ED s/p 20mg IV lasix w/ improvement in hypoxemia. In the setting of bilateral lower extremity edema and a likely hx of CHF. Patient also reports 70 pack years of smoking. No wheezes or crackles on exam. Most likely flash pulmonary edema in the setting of heart failure.    - now resolving   - c/w diuresis below  - strict I/Os   - Wean O2 as tolerated (currently 95% o2 sat on 2L NC) Patient with a transiently low O2 saturation on room air after receiving 500cc bolus of normal saline in the ED s/p 20mg IV lasix w/ improvement in hypoxemia. In the setting of b/l LE edema and a likely hx of CHF. Patient also reports 70 pack years of smoking. No wheezes or crackles on exam. Most likely flash pulmonary edema in the setting of heart failure.    - now resolving   - c/w diuresis below  - strict I/Os   - Wean O2 as tolerated (currently 95% o2 sat on 2L NC) Patient showing improvement in hypoxemia. In the setting of b/l LE edema and a likely hx of CHF. Patient also reports 70 pack years of smoking. No wheezes or crackles on exam. Most likely flash pulmonary edema in the setting of heart failure.    - now resolving   - c/w diuresis below  - strict I/Os   - Wean O2 as tolerated (currently 95% o2 sat on 2L NC) Patient showing improvement in hypoxemia. In the setting of b/l LE edema and a likely hx of CHF (unconfirmed). Patient also reports 70 pack years of smoking. No wheezes or crackles on exam.   - now resolving   - c/w diuresis below  - strict I/Os   - Wean O2 as tolerated (currently 95% o2 sat on 2L NC)

## 2024-10-28 NOTE — CONSULT NOTE ADULT - SUBJECTIVE AND OBJECTIVE BOX
**Incomplete Note**    Cardiology Consult    O/N:  Interval History/HPI: Pt seen and examined at bedside, NAD, denies chest pain/discomfort/pressure. ROS unremarkable   Telemetry:      Review of Systems:  CONSTITUTIONAL:  No weight loss, fever, chills, weakness or fatigue.  HEENT:  Eyes:  No visual loss, blurred vision, double vision or yellow sclerae. Ears, Nose, Throat:  No hearing loss, sneezing, congestion, runny nose or sore throat.  SKIN:  No rash or itching.  CARDIOVASCULAR:  No chest pain, chest pressure or chest discomfort. No palpitations or edema.  RESPIRATORY:  No shortness of breath, cough or sputum.  GASTROINTESTINAL:  No anorexia, nausea, vomiting or diarrhea. No abdominal pain or blood.  GENITOURINARY: No Burning on urination.   NEUROLOGICAL:  see HPI  MUSCULOSKELETAL:  No muscle, back pain, joint pain or stiffness.  HEMATOLOGIC:  No anemia, bleeding or bruising.  LYMPHATICS:  No enlarged nodes. No history of splenectomy.  PSYCHIATRIC:  No history of depression or anxiety.  ENDOCRINOLOGIC:  No reports of sweating, cold or heat intolerance. No polyuria or polydipsia.  ALLERGIES:  No history of asthma, hives, eczema or rhinitis.    OBJECTIVE  T(C): 36.3 (10-28-24 @ 09:56), Max: 37 (10-27-24 @ 20:53)  HR: 67 (10-28-24 @ 09:56) (61 - 78)  BP: 131/71 (10-28-24 @ 09:56) (122/65 - 155/80)  RR: 18 (10-28-24 @ 09:56) (16 - 22)  SpO2: 95% (10-28-24 @ 09:56) (92% - 97%)    10-27-24 @ 07:01  -  10-28-24 @ 07:00  --------------------------------------------------------  IN: 0 mL / OUT: 400 mL / NET: -400 mL    10-28-24 @ 07:01  -  10-28-24 @ 12:40  --------------------------------------------------------  IN: 0 mL / OUT: 200 mL / NET: -200 mL        PHYSICAL EXAM:    Constitutional: resting comfortably in bed; NAD  HEENT: NC/AT, PERRL, EOMI, anicteric sclera, no nasal discharge; uvula midline, no oropharyngeal erythema or exudates; MMM  Neck: supple; no thyromegaly, JVP ? cm H20, JVD +/-  Respiratory: CTA B/L; no W/R/R, no retractions  Cardiac: +S1/S2; RRR; no M/R/G; PMI non-displaced  Gastrointestinal: soft, NT/ND; no rebound or guarding; +BSx4  Extremities: WWP, no clubbing or cyanosis; no peripheral edema  Musculoskeletal: NROM x4; no joint swelling, tenderness or erythema  Vascular: 2+ radial, DP/PT pulses B/L  Dermatologic: skin warm, dry and intact; no rashes, wounds, or scars  Lymphatic: no submandibular or cervical LAD  Neurologic: AAOx3; CNII-XII grossly intact; no focal deficits    LABS:                        13.0   9.24  )-----------( 120      ( 28 Oct 2024 05:30 )             41.1     10-28    144  |  101  |  39[H]  ----------------------------<  76  4.2   |  37[H]  |  2.11[H]    Ca    8.8      28 Oct 2024 05:30  Phos  4.8     10-28  Mg     1.8     10-28    TPro  7.3  /  Alb  3.3  /  TBili  1.0  /  DBili  0.2  /  AST  34  /  ALT  17  /  AlkPhos  74  10-27      Urinalysis Basic - ( 28 Oct 2024 05:30 )    Color: x / Appearance: x / SG: x / pH: x  Gluc: 76 mg/dL / Ketone: x  / Bili: x / Urobili: x   Blood: x / Protein: x / Nitrite: x   Leuk Esterase: x / RBC: x / WBC x   Sq Epi: x / Non Sq Epi: x / Bacteria: x        RADIOLOGY & ADDITIONAL TESTS:  Reviewed .    MEDICATIONS  (STANDING):  aspirin  chewable 81 milliGRAM(s) Oral daily  atorvastatin 40 milliGRAM(s) Oral at bedtime  ceFAZolin   IVPB 1000 milliGRAM(s) IV Intermittent every 12 hours  cloNIDine 0.1 milliGRAM(s) Oral two times a day  enoxaparin Injectable 30 milliGRAM(s) SubCutaneous every 24 hours  furosemide   Injectable 20 milliGRAM(s) IV Push once  metoprolol succinate ER 50 milliGRAM(s) Oral every 12 hours  multivitamin 1 Tablet(s) Oral daily  pantoprazole    Tablet 40 milliGRAM(s) Oral before breakfast    MEDICATIONS  (PRN):  acetaminophen     Tablet .. 650 milliGRAM(s) Oral every 6 hours PRN Temp greater or equal to 38C (100.4F), Mild Pain (1 - 3)  ondansetron Injectable 4 milliGRAM(s) IV Push every 8 hours PRN Nausea and/or Vomiting     **Incomplete Note**    Cardiology Consult    O/N:  Interval History/HPI: Aravind Jiang is an 84M with PMHx of HTN, HLD, CAD (last PCI 18y ago), CHF (no echo on file), CKD (baseline Cr 1.6), GERD, stroke (residual RUE weakness), anxiety/depression who was brought to ED by girlfriend Tanya for leg swelling, redness, and oozing. Patient reports he has had bilateral leg swelling for approximately 3 weeks that has worsened in the last 3 days to the point he felt he needed to come in. Patient denies similar episodes in the past, although upon chart review, patient was admitted for bilateral lower extremity swelling and left leg non-purulent cellulitis in 9/2021. Patient reports smoking for approximately 70 years, endorses shortness of breath with exertion, orthopnea, leg swelling, and denies any history of heart problems, fevers, chills, abdominal pain, chest pain. Cardiology was consulted for c/f Acute Decompensated Heart Failure.    Pt seen and examined at bedside. Patient was sitting up in bed, in NAD, and denied chest pain/discomfort/pressure. Patient reports being able to walk 2 city blocks and 1 flight of stairs before experiencing dyspnea.    Telemetry: NSR      Review of Systems:  CONSTITUTIONAL:  No weight loss, fever, chills, weakness or fatigue.  HEENT:  Eyes:  No visual loss, blurred vision, double vision or yellow sclerae. Ears, Nose, Throat:  No hearing loss, sneezing, congestion, runny nose or sore throat.  SKIN:  No rash or itching.  CARDIOVASCULAR:  Endorses dyspnea with exertion, orthopnea, and bilateral leg swelling. No chest pain, chest pressure, palpitations, or chest discomfort.   RESPIRATORY:  No shortness of breath, cough or sputum.  GASTROINTESTINAL:  No anorexia, nausea, vomiting or diarrhea. No abdominal pain or blood.  GENITOURINARY: No Burning on urination.   NEUROLOGICAL:  see HPI  MUSCULOSKELETAL:  No muscle, back pain, joint pain or stiffness.  HEMATOLOGIC:  No anemia, bleeding or bruising.  LYMPHATICS:  No enlarged nodes. No history of splenectomy.  PSYCHIATRIC:  No history of depression or anxiety.  ENDOCRINOLOGIC:  No reports of sweating, cold or heat intolerance. No polyuria or polydipsia.  ALLERGIES:  No history of asthma, hives, eczema or rhinitis.    OBJECTIVE  T(C): 36.3 (10-28-24 @ 09:56), Max: 37 (10-27-24 @ 20:53)  HR: 67 (10-28-24 @ 09:56) (61 - 78)  BP: 131/71 (10-28-24 @ 09:56) (122/65 - 155/80)  RR: 18 (10-28-24 @ 09:56) (16 - 22)  SpO2: 95% (10-28-24 @ 09:56) (92% - 97%)    10-27-24 @ 07:01  -  10-28-24 @ 07:00  --------------------------------------------------------  IN: 0 mL / OUT: 400 mL / NET: -400 mL    10-28-24 @ 07:01  -  10-28-24 @ 12:40  --------------------------------------------------------  IN: 0 mL / OUT: 200 mL / NET: -200 mL        PHYSICAL EXAM:    Constitutional: sitting up comfortably in bed; NAD  HEENT: NC/AT, PERRL, EOMI, anicteric sclera, no nasal discharge; uvula midline, no oropharyngeal erythema or exudates; MMM  Neck: supple; no thyromegaly, no evidence of elevated JVD  Respiratory: +audible secretions; CTA B/L; no W/R/R, no retractions  Cardiac: +S1/S2; RRR; no M/R/G; PMI non-displaced  Gastrointestinal: soft, NT/ND; no rebound or guarding; +BSx4  Extremities: +2 b/l LE pitting edema; WWP, no clubbing or cyanosis  Musculoskeletal: NROM x4; no joint swelling, tenderness or erythema  Vascular: 2+ radial, DP/PT pulses B/L  Dermatologic: skin warm, dry and intact; no rashes, wounds, or scars  Lymphatic: no submandibular or cervical LAD  Neurologic: AAOx3; CNII-XII grossly intact; no focal deficits    LABS:                        13.0   9.24  )-----------( 120      ( 28 Oct 2024 05:30 )             41.1     10-28    144  |  101  |  39[H]  ----------------------------<  76  4.2   |  37[H]  |  2.11[H]    Ca    8.8      28 Oct 2024 05:30  Phos  4.8     10-28  Mg     1.8     10-28    TPro  7.3  /  Alb  3.3  /  TBili  1.0  /  DBili  0.2  /  AST  34  /  ALT  17  /  AlkPhos  74  10-27      Urinalysis Basic - ( 28 Oct 2024 05:30 )    Color: x / Appearance: x / SG: x / pH: x  Gluc: 76 mg/dL / Ketone: x  / Bili: x / Urobili: x   Blood: x / Protein: x / Nitrite: x   Leuk Esterase: x / RBC: x / WBC x   Sq Epi: x / Non Sq Epi: x / Bacteria: x        RADIOLOGY & ADDITIONAL TESTS:  10/27/24 XR CHEST:  IMPRESSION: No consolidations. No pleural effusions. No pneumothorax. Degenerative   changes of the spine. No acute abnormalities of the soft tissues and   osseous structures.    MEDICATIONS  (STANDING):  aspirin  chewable 81 milliGRAM(s) Oral daily  atorvastatin 40 milliGRAM(s) Oral at bedtime  ceFAZolin   IVPB 1000 milliGRAM(s) IV Intermittent every 12 hours  cloNIDine 0.1 milliGRAM(s) Oral two times a day  enoxaparin Injectable 30 milliGRAM(s) SubCutaneous every 24 hours  furosemide   Injectable 20 milliGRAM(s) IV Push once  metoprolol succinate ER 50 milliGRAM(s) Oral every 12 hours  multivitamin 1 Tablet(s) Oral daily  pantoprazole    Tablet 40 milliGRAM(s) Oral before breakfast    MEDICATIONS  (PRN):  acetaminophen     Tablet .. 650 milliGRAM(s) Oral every 6 hours PRN Temp greater or equal to 38C (100.4F), Mild Pain (1 - 3)  ondansetron Injectable 4 milliGRAM(s) IV Push every 8 hours PRN Nausea and/or Vomiting     Cardiology Consult      HPI:  Interval Hx/HPI: Aravind Jiang is an 84M with PMHx of HTN, HLD, CAD (last PCI 18y ago), CHF (no echo on file), CKD (baseline Cr 1.6), GERD, stroke (residual RUE weakness), anxiety/depression who was brought to ED by girlfriend Tanya for leg swelling, redness, and oozing. Patient reports he has had bilateral leg swelling for approximately 3 weeks that has worsened in the last 3 days to the point he felt he needed to come in. Patient denies similar episodes in the past, although upon chart review, patient was admitted for bilateral lower extremity swelling and left leg non-purulent cellulitis in 9/2021. Patient reports smoking for approximately 70 years, endorses shortness of breath with exertion, orthopnea, leg swelling, and denies any history of heart problems, fevers, chills, abdominal pain, chest pain. Cardiology was consulted for c/f Acute Decompensated Heart Failure.    Pt seen and examined at bedside. Patient was sitting up in bed, in NAD, and denied chest pain/discomfort/pressure. Patient reports being able to walk 2 city blocks and 1 flight of stairs before experiencing dyspnea.    Pt seen and examined at bedside, NAD, denies chest pain/pressure/discomfort. ROS negative      Review of Systems:  CONSTITUTIONAL:  No weight loss, fever, chills, weakness or fatigue.  HEENT:  Eyes:  No visual loss, blurred vision, double vision or yellow sclerae. Ears, Nose, Throat:  No hearing loss, sneezing, congestion, runny nose or sore throat.  SKIN:  No rash or itching.  CARDIOVASCULAR:  No chest pain, chest pressure or chest discomfort. No palpitations or edema.  RESPIRATORY:  No shortness of breath, cough or sputum.  GASTROINTESTINAL:  No anorexia, nausea, vomiting or diarrhea. No abdominal pain or blood.  GENITOURINARY: No Burning on urination.   NEUROLOGICAL:  see HPI  MUSCULOSKELETAL:  No muscle, back pain, joint pain or stiffness.  HEMATOLOGIC:  No anemia, bleeding or bruising.  LYMPHATICS:  No enlarged nodes. No history of splenectomy.  PSYCHIATRIC:  No history of depression or anxiety.  ENDOCRINOLOGIC:  No reports of sweating, cold or heat intolerance. No polyuria or polydipsia.  ALLERGIES:  No history of asthma, hives, eczema or rhinitis.    PAST MEDICAL & SURGICAL HISTORY:  HTN (hypertension)      Arthritis      GERD (gastroesophageal reflux disease)      Chronic CHF      CAD (coronary artery disease)      Constipation      Depression      Chronic renal insufficiency      Cellulitis, leg      HTN (hypertension)      HLD (hyperlipidemia)      GERD (gastroesophageal reflux disease)      Chronic systolic congestive heart failure      CAD (coronary artery disease)      History of BPH      Constipation      Depression      Anxiety      Chronic renal insufficiency      H/O hernia repair        SOCIAL HISTORY:  FAMILY HISTORY:  No pertinent family history in first degree relatives      ALLERGIES: 	  No Known Allergies          MEDICATIONS:  acetaminophen     Tablet .. 650 milliGRAM(s) Oral every 6 hours PRN  aspirin  chewable 81 milliGRAM(s) Oral daily  atorvastatin 40 milliGRAM(s) Oral at bedtime  cloNIDine 0.1 milliGRAM(s) Oral two times a day  enoxaparin Injectable 30 milliGRAM(s) SubCutaneous every 24 hours  metoprolol succinate ER 50 milliGRAM(s) Oral every 12 hours  multivitamin 1 Tablet(s) Oral daily  ondansetron Injectable 4 milliGRAM(s) IV Push every 8 hours PRN  pantoprazole    Tablet 40 milliGRAM(s) Oral before breakfast  vancomycin  IVPB 1000 milliGRAM(s) IV Intermittent every 24 hours      T(C): 36.3 (10-28-24 @ 09:56), Max: 37 (10-27-24 @ 20:53)  HR: 68 (10-28-24 @ 12:42) (61 - 78)  BP: 149/79 (10-28-24 @ 12:42) (122/65 - 155/80)  RR: 18 (10-28-24 @ 12:42) (16 - 20)  SpO2: 96% (10-28-24 @ 12:42) (95% - 97%)    10-27-24 @ 07:01  -  10-28-24 @ 07:00  --------------------------------------------------------  IN: 0 mL / OUT: 400 mL / NET: -400 mL    10-28-24 @ 07:01  -  10-28-24 @ 16:13  --------------------------------------------------------  IN: 0 mL / OUT: 200 mL / NET: -200 mL      PHYSICAL EXAM:    Constitutional: sitting up comfortably in bed; NAD  HEENT: NC/AT, PERRL, EOMI, anicteric sclera, no nasal discharge; uvula midline, no oropharyngeal erythema or exudates; MMM  Neck: supple; no thyromegaly, no evidence of elevated JVD  Respiratory: +audible secretions; CTA B/L; no W/R/R, no retractions  Cardiac: +S1/S2; RRR; no M/R/G; PMI non-displaced  Gastrointestinal: soft, NT/ND; no rebound or guarding; +BSx4  Extremities: +2 b/l LE pitting edema; WWP, no clubbing or cyanosis  Musculoskeletal: NROM x4; no joint swelling, tenderness or erythema  Vascular: 2+ radial, DP/PT pulses B/L  Dermatologic: skin warm, dry and intact; no rashes, wounds, or scars  Neurologic: AAOx3;        I&O's Summary    27 Oct 2024 07:01  -  28 Oct 2024 07:00  --------------------------------------------------------  IN: 0 mL / OUT: 400 mL / NET: -400 mL    28 Oct 2024 07:01  -  28 Oct 2024 16:13  --------------------------------------------------------  IN: 0 mL / OUT: 200 mL / NET: -200 mL        LABS:	 	                        13.0   9.24  )-----------( 120      ( 28 Oct 2024 05:30 )             41.1     10-28    144  |  101  |  39[H]  ----------------------------<  76  4.2   |  37[H]  |  2.11[H]    Ca    8.8      28 Oct 2024 05:30  Phos  4.8     10-28  Mg     1.8     10-28    TPro  7.3  /  Alb  3.3  /  TBili  1.0  /  DBili  0.2  /  AST  34  /  ALT  17  /  AlkPhos  74  10-27    CARDIAC MARKERS ( 27 Oct 2024 12:33 )  x     / x     / x     / x     / 3.5 ng/mL       Cardiology Consult      HPI:  Interval Hx/HPI: Aravind Jiang is an 84M with PMHx of HTN, HLD, CAD (last PCI 18y ago), CHF (no echo on file), CKD (baseline Cr 1.6), GERD, stroke (residual RUE weakness), anxiety/depression who was brought to ED by girlfriend Tanya for leg swelling, redness, and oozing. Patient reports he has had bilateral leg swelling for approximately 3 weeks that has worsened in the last 3 days to the point he felt he needed to come in. Patient denies similar episodes in the past, although upon chart review, patient was admitted for bilateral lower extremity swelling and left leg non-purulent cellulitis in 9/2021. Patient reports smoking for approximately 70 years, endorses shortness of breath with exertion, orthopnea, leg swelling, and denies any history of heart problems, fevers, chills, abdominal pain, chest pain. Cardiology was consulted for c/f Acute Decompensated Heart Failure.    Pt seen and examined at bedside. Patient was sitting up in bed, in NAD, and denied chest pain/discomfort/pressure. Patient reports being able to walk 2 city blocks and 1 flight of stairs before experiencing dyspnea. ROS negative      Review of Systems:  CONSTITUTIONAL:  No weight loss, fever, chills, weakness or fatigue.  HEENT:  Eyes:  No visual loss, blurred vision, double vision or yellow sclerae. Ears, Nose, Throat:  No hearing loss, sneezing, congestion, runny nose or sore throat.  SKIN:  No rash or itching.  CARDIOVASCULAR:  No chest pain, chest pressure or chest discomfort. No palpitations or edema.  RESPIRATORY:  No shortness of breath, cough or sputum.  GASTROINTESTINAL:  No anorexia, nausea, vomiting or diarrhea. No abdominal pain or blood.  GENITOURINARY: No Burning on urination.   NEUROLOGICAL:  see HPI  MUSCULOSKELETAL:  No muscle, back pain, joint pain or stiffness.  HEMATOLOGIC:  No anemia, bleeding or bruising.  LYMPHATICS:  No enlarged nodes. No history of splenectomy.  PSYCHIATRIC:  No history of depression or anxiety.  ENDOCRINOLOGIC:  No reports of sweating, cold or heat intolerance. No polyuria or polydipsia.  ALLERGIES:  No history of asthma, hives, eczema or rhinitis.    PAST MEDICAL & SURGICAL HISTORY:  HTN (hypertension)      Arthritis      GERD (gastroesophageal reflux disease)      Chronic CHF      CAD (coronary artery disease)      Constipation      Depression      Chronic renal insufficiency      Cellulitis, leg      HTN (hypertension)      HLD (hyperlipidemia)      GERD (gastroesophageal reflux disease)      Chronic systolic congestive heart failure      CAD (coronary artery disease)      History of BPH      Constipation      Depression      Anxiety      Chronic renal insufficiency      H/O hernia repair        SOCIAL HISTORY:  FAMILY HISTORY:  No pertinent family history in first degree relatives      ALLERGIES: 	  No Known Allergies          MEDICATIONS:  acetaminophen     Tablet .. 650 milliGRAM(s) Oral every 6 hours PRN  aspirin  chewable 81 milliGRAM(s) Oral daily  atorvastatin 40 milliGRAM(s) Oral at bedtime  cloNIDine 0.1 milliGRAM(s) Oral two times a day  enoxaparin Injectable 30 milliGRAM(s) SubCutaneous every 24 hours  metoprolol succinate ER 50 milliGRAM(s) Oral every 12 hours  multivitamin 1 Tablet(s) Oral daily  ondansetron Injectable 4 milliGRAM(s) IV Push every 8 hours PRN  pantoprazole    Tablet 40 milliGRAM(s) Oral before breakfast  vancomycin  IVPB 1000 milliGRAM(s) IV Intermittent every 24 hours      T(C): 36.3 (10-28-24 @ 09:56), Max: 37 (10-27-24 @ 20:53)  HR: 68 (10-28-24 @ 12:42) (61 - 78)  BP: 149/79 (10-28-24 @ 12:42) (122/65 - 155/80)  RR: 18 (10-28-24 @ 12:42) (16 - 20)  SpO2: 96% (10-28-24 @ 12:42) (95% - 97%)    10-27-24 @ 07:01  -  10-28-24 @ 07:00  --------------------------------------------------------  IN: 0 mL / OUT: 400 mL / NET: -400 mL    10-28-24 @ 07:01  -  10-28-24 @ 16:13  --------------------------------------------------------  IN: 0 mL / OUT: 200 mL / NET: -200 mL      PHYSICAL EXAM:    Constitutional: sitting up comfortably in bed; NAD  HEENT: NC/AT, PERRL, EOMI, anicteric sclera, no nasal discharge; uvula midline, no oropharyngeal erythema or exudates; MMM  Neck: supple; no thyromegaly, no evidence of elevated JVD  Respiratory: +audible secretions; CTA B/L; no W/R/R, no retractions  Cardiac: +S1/S2; RRR; no M/R/G; PMI non-displaced  Gastrointestinal: soft, NT/ND; no rebound or guarding; +BSx4  Extremities: +2 b/l LE pitting edema; WWP, no clubbing or cyanosis  Musculoskeletal: NROM x4; no joint swelling, tenderness or erythema  Vascular: 2+ radial, DP/PT pulses B/L  Dermatologic: skin warm, dry and intact; no rashes, wounds, or scars  Neurologic: AAOx3;        I&O's Summary    27 Oct 2024 07:01  -  28 Oct 2024 07:00  --------------------------------------------------------  IN: 0 mL / OUT: 400 mL / NET: -400 mL    28 Oct 2024 07:01  -  28 Oct 2024 16:13  --------------------------------------------------------  IN: 0 mL / OUT: 200 mL / NET: -200 mL        LABS:	 	                        13.0   9.24  )-----------( 120      ( 28 Oct 2024 05:30 )             41.1     10-28    144  |  101  |  39[H]  ----------------------------<  76  4.2   |  37[H]  |  2.11[H]    Ca    8.8      28 Oct 2024 05:30  Phos  4.8     10-28  Mg     1.8     10-28    TPro  7.3  /  Alb  3.3  /  TBili  1.0  /  DBili  0.2  /  AST  34  /  ALT  17  /  AlkPhos  74  10-27    CARDIAC MARKERS ( 27 Oct 2024 12:33 )  x     / x     / x     / x     / 3.5 ng/mL

## 2024-10-28 NOTE — PROGRESS NOTE ADULT - PROBLEM SELECTOR PLAN 5
EKG w/ known RBBB/LAFB. New T-wave inversions noted on EKG today on V4/V5 compared to 2021. Denies CP. trops mildly elevated i/s/o darleen. CK/CKMB wnl.   - trend trops   - repeat EKG  - See CHF plan above EKG w/ known RBBB/LAFB. T-wave inversions noted on EKG 10/27 on V4/V5 compared to 2021. Denies CP. trops mildly elevated i/s/o darleen. CK/CKMB wnl.   - downtrending troponin (76 ->69)  - repeat EKG  - See CHF plan above EKG w/ known RBBB/LAFB. T-wave inversions noted on EKG 10/27 on V4/V5 compared to 2021. Denies CP. trops mildly elevated i/s/o darleen. CK/CKMB wnl.   - trend troponin   - repeat EKG  - See CHF plan above EKG w/ known RBBB/LAFB. T-wave inversions noted on EKG 10/27 on V4/V5 compared to 2021. Denies CP. trops still elevated i/s/o darleen. CK/CKMB wnl.   - continue trending troponin   - repeat EKG  - See CHF plan above EKG w/ known RBBB/LAFB. T-wave inversions noted on EKG 10/27 on V4/V5 compared to 2021. Denies CP. trops still elevated i/s/o darleen. CK/CKMB wnl.    - continue trending troponin   - repeat EKG  - See CHF plan above EKG w/ known RBBB/LAFB. T-wave inversions noted on EKG 10/27 on V4/V5 compared to 2021. Denies CP. Trops downtrending now. CK/CKMB wnl.    - See CHF plan above

## 2024-10-28 NOTE — PROVIDER CONTACT NOTE (CRITICAL VALUE NOTIFICATION) - TEST AND RESULT REPORTED:
Troponin 77
troponin 76
10/27 blood culture - growth in aerobic/anaerobic vials, Gram + cocci in clusters
blood culture 10/27: gram positive cocci in clusters

## 2024-10-28 NOTE — PROGRESS NOTE ADULT - PROBLEM SELECTOR PLAN 11
On previous admission patient was noted to have PEDRITO. Patient reports that he does not use a CPAP machine at home but he is amendable to trying it.   - CPAP overnight if tolerated

## 2024-10-28 NOTE — PROGRESS NOTE ADULT - PROBLEM SELECTOR PLAN 7
- continue w/ metoprolol 50mg BID  - clonidine 0.1mg BID  - hold home fosinopril 40mg given KALA  - Holding HCTZ 12.5mg QD given KALA - continue w/ metoprolol 50mg BID  - clonidine 0.1mg BID  - hold home fosinopril 40mg & home HCTZ 12.5 mg qd given KALA - c/w metoprolol 50mg BID  - clonidine 0.1mg BID  - hold home fosinopril 40mg & home HCTZ 12.5 mg qd given KALA - c/w metoprolol 50mg BID  - c/w clonidine 0.1mg BID to prevent refractory HTN  - held home fosinopril 40mg & home HCTZ 12.5 mg qd given KALA - c/w metoprolol 50mg QD (switched from home BID)  - c/w clonidine 0.1mg BID to prevent refractory HTN  - held home fosinopril 40mg & home HCTZ 12.5 mg qd given KALA

## 2024-10-28 NOTE — PROGRESS NOTE ADULT - PROBLEM SELECTOR PLAN 3
Likely acute on chronic CHF in setting of IV fluid administration. On exam, chest clear to auscultation w/ b/l lower extremity edema to above knees, erythema and tenderness.  No previous echo available. Labs remarkable for BNP of 8.6k, Trop elevated to 76, normal CKMB. CXR revealed clear lung fields. EKG w/ known RBBB/LAFB. New T-wave inversions noted on EKG today on V4/V5 compared to 2021. Pt denies CP. trops mildly elevated iso KALA, CK/CKMB wnl   - f/u TTE  - s/p lasix 20mg IVP with improvement of resp status  - continue lasix   - Strict I/Os  - cards consult   - Wean O2 as tolerated above  - trend trops Likely acute on chronic CHF in setting of IV fluid administration. On exam, chest clear to auscultation w/ b/l lower extremity edema to above knees, erythema and tenderness.  No previous echo available. Labs remarkable for BNP of 8.6k, Trop elevated to 76, normal CKMB. CXR revealed clear lung fields. EKG w/ known RBBB/LAFB. New T-wave inversions noted on EKG today on V4/V5 compared to 2021. Pt denies CP. trops mildly elevated iso KALA, CK/CKMB wnl   - f/u TTE  - s/p lasix 20mg IVP with improvement of resp status  - continue lasix   - Strict I/Os  - cards consult   - Wean O2 as tolerated above  - troponin downtrending 76--> 69 Likely acute on chronic CHF in setting of IV fluid administration. On exam, chest clear to auscultation w/ b/l lower extremity edema to above knees, erythema and tenderness.  No previous echo available. Labs remarkable for BNP of 8.6k, Trop elevated to 76, normal CKMB. CXR revealed clear lung fields. EKG w/ known RBBB/LAFB. New T-wave inversions noted on EKG today on V4/V5 compared to 2021. Pt denies CP. trops mildly elevated iso KALA, CK/CKMB wnl, now downtrending.   - f/u TTE  - s/p lasix 20mg IVP with improvement of resp status  - continue lasix   - Strict I/Os  - cards consult   - Wean O2 as tolerated above  - troponin downtrending 76 to 69 Likely acute on chronic CHF in setting of IV fluid administration. On exam, chest clear to auscultation w/ b/l lower extremity edema to above knees, erythema and tenderness.  No previous echo available. Labs remarkable for BNP of 8.6k, Trop downtrending 76 to 69, normal CKMB. CXR revealed clear lung fields. EKG w/ known RBBB/LAFB. New T-wave inversions noted on EKG today on V4/V5 compared to 2021. Pt denies CP. trops mildly elevated iso KALA, CK/CKMB wnl, now downtrending.   - f/u TTE  - s/p lasix 20mg IVP with improvement of resp status  - continue lasix   - Strict I/Os  - f/u cards consult   - Wean O2 as tolerated above Likely acute on chronic CHF in setting of IV fluid administration. On exam, chest clear to auscultation w/ b/l lower extremity edema to mid-shins, erythema and tenderness.  No previous echo available. Labs remarkable for BNP of 8.6k, Trop downtrending 76 to 69, normal CKMB. CXR revealed clear lung fields. EKG w/ known RBBB/LAFB. New T-wave inversions noted on EKG today on V4/V5 compared to 2021. Pt denies CP. trops mildly elevated iso KALA, CK/CKMB wnl, now downtrending.   - f/u TTE  - s/p lasix 20mg IVP with improvement of resp status  - continue lasix   - Strict I/Os  - f/u cards consult   - Wean O2 as tolerated above Likely acute on chronic CHF in setting of IV fluid administration. On exam, chest clear to auscultation w/ b/l lower extremity edema to mid-shins, erythema and tenderness.  No previous echo available. Labs remarkable for BNP of 8.6k, Trop downtrending 76 to 69, normal CKMB. CXR revealed clear lung fields. EKG w/ known RBBB/LAFB. New T-wave inversions noted on EKG today on V4/V5 compared to 2021. Pt denies CP. trops mildly elevated iso KALA, CK/CKMB wnl, now downtrending.   - f/u TTE  - s/p lasix 20mg IVP with improvement of resp status  - continue lasix   - Strict I/Os  - f/u cards consult  - Wean O2 as tolerated above Likely acute on chronic CHF in setting of IV fluid administration. On exam, chest clear to auscultation w/ b/l lower extremity edema to mid-shins, erythema and tenderness.  No previous echo available. Labs remarkable for BNP of 8.6k, Trop downtrending 76 to 69, normal CKMB. CXR revealed clear lung fields. EKG w/ known RBBB/LAFB. New T-wave inversions noted on EKG today on V4/V5 compared to 2021. Pt denies CP. trops mildly elevated iso KALA, CK/CKMB wnl, now downtrending.   - f/u TTE  - s/p lasix 20mg IVP with improvement of resp status  - continue lasix   - home cardiologist: Dr. Kwesi Denny ([phone])  - Strict I/Os  - f/u cards consult  - Wean O2 as tolerated above Likely acute on chronic CHF in setting of IV fluid administration. On exam, chest clear to auscultation w/ b/l lower extremity edema to mid-shins, erythema and tenderness.  No previous echo available. Labs remarkable for BNP of 8.6k, Trop still elevated, normal CKMB. CXR revealed clear lung fields. EKG w/ known RBBB/LAFB. New T-wave inversions noted on EKG today on V4/V5 compared to 2021. Pt denies CP. trops mildly elevated iso KALA, CK/CKMB wnl, now downtrending.   - f/u TTE  - s/p lasix 20mg IVP with improvement of resp status  - continue lasix   - home cardiologist: Dr. Kwesi Denny ([phone])  - Strict I/Os  - f/u cards consult  - Wean O2 as tolerated above Likely acute on chronic CHF in setting of IV fluid administration. On exam, chest clear to auscultation w/ b/l lower extremity edema to mid-shins, erythema and tenderness. No previous echo available. Labs remarkable for BNP of 8.6k, Trop still elevated, normal CKMB. CXR revealed clear lung fields. EKG w/ known RBBB/LAFB. New T-wave inversions noted on EKG today on V4/V5 compared to 2021. Pt denies CP. trops mildly elevated iso KALA, CK/CKMB wnl, now downtrending.   - f/u TTE  - s/p lasix 20mg IVP with improvement of resp status; continue lasix  - Strict I/Os  - f/u cards consult  - home cardiologist: Dr. Kwesi Denny ([phone]), upcoming appt 11/25        lost to f/u in 2015 after cardiac cath performed  - Wean O2 as tolerated above Likely acute on chronic CHF in setting of IV fluid administration. On exam, chest clear to auscultation w/ b/l lower extremity edema to mid-shins, erythema and tenderness. No previous echo available. Labs remarkable for BNP of 8.6k, Trop still elevated, normal CKMB. CXR revealed clear lung fields. EKG w/ known RBBB/LAFB. New T-wave inversions noted on EKG today on V4/V5 compared to 2021. Pt denies CP. trops mildly elevated iso KALA, CK/CKMB wnl.  - f/u TTE  - f/u trops  - s/p lasix 20mg IVP with improvement of resp status; continue lasix  - Strict I/Os  - f/u cards consult  - home cardiologist: Dr. Kwesi Denny ([phone]), upcoming appt 11/25        lost to f/u in 2015 after cardiac cath performed  - Wean O2 as tolerated above Likely acute on chronic CHF in setting of IV fluid administration. On exam, chest clear to auscultation w/ b/l lower extremity edema to mid-shins, erythema and tenderness. No previous echo available. Labs remarkable for BNP of 8.6k, Trop still elevated, normal CKMB. CXR revealed clear lung fields. EKG w/ known RBBB/LAFB. New T-wave inversions noted on EKG today on V4/V5 compared to 2021. Pt denies CP. trops mildly elevated iso KALA, CK/CKMB wnl. Per OP cardiologist, Dr. Kwesi Denny ([phone]), lost to f/u in 2015 after cardiac cath performed, no record of heart failure; no previous echos. Appears to be first episode decompensated heart failure episode.   - Cardiology consulted, appreciate recs       - f/u TTE       - lasix 40mg IV BID            - s/p lasix 20mg IVP with improvement of resp status on 10/27            - goal net negative 1-1.5L in 24 hrs       - [facility] metoprolol 50mg to QD (from home BID dose, given active diuresis)        - Strict I/Os       - Daily weights       - c/w ASA 81mg & statin 40mg QD       - f/u A1c & lipid panel  - Wean O2 as tolerated above Likely acute on chronic CHF in setting of IV fluid administration. On exam, chest clear to auscultation w/ b/l lower extremity edema to mid-shins, erythema and tenderness. No previous echo available. Labs remarkable for BNP of 8.6k, Trop still elevated, normal CKMB. CXR revealed clear lung fields. EKG w/ known RBBB/LAFB. New T-wave inversions noted on EKG today on V4/V5 compared to 2021. Pt denies CP. trops mildly elevated iso KALA, CK/CKMB wnl. Per OP cardiologist, Dr. Kwesi Denny ([phone]), lost to f/u in 2015 after cardiac cath performed, no record of heart failure; no previous echos. Appears to be first episode decompensated heart failure episode.   - Cardiology consulted, appreciate recs       - f/u TTE       - lasix 40mg IV BID            - s/p lasix 20mg IVP with improvement of resp status on 10/27            - goal net negative 1-1.5L in 24 hrs       - [facility] metoprolol 50mg to QD (from home BID dose, given active diuresis)        - Strict I/Os       - Daily weights       - c/w ASA 81mg & statin 40mg QD       - f/u A1c & lipid panel  - Wean O2 as tolerated above    #Demand Ischemia  Patient with elevated troponin on admission, downtrending now. No chest pain, SOB. No signs of any ST elevations or depressions on EKG. Elevated troponin likely from demand ischemia in the setting of heart failure exacerbation.

## 2024-10-28 NOTE — PROGRESS NOTE ADULT - ASSESSMENT
CRISTOBAL PORRAS is a 84y year old Male with a PMHx significant for CHF, HTN, HLD, CAD, CKD, GERD, anxiety/depression, brought to ED by family, for 3 week history of leg swelling, redness and blistering that has worsened in the last 3 days found to have cellulitis, ED course c/b respiratory distress after IV fluids thought to be HF exacerbation improving after IV lasix. Admitted to medicine team for further management of AHRF, HF exacerbation and non-purulent cellulitis.  84y year old Male with a PMHx significant for CHF, HTN, HLD, CAD, CKD, GERD, anxiety/depression, brought to ED by family, for 3 week history of leg swelling, redness and blistering that has worsened in the last 3 days found to have cellulitis, ED course c/b respiratory distress after IV fluids thought to be HF exacerbation improving after IV lasix. Admitted to medicine team for further management of AHRF, HF exacerbation and non-purulent cellulitis.  84y year old Male with a PMHx significant for CHF, HTN, HLD, CAD, CKD, GERD, anxiety/depression, brought to ED by family, for 3 week history of leg swelling, redness and blistering that has worsened in the last 3 days found to have cellulitis, ED course c/b respiratory distress after IV fluids thought to be HF exacerbation improving after IV lasix. Now monitoring AHRF, HF exacerbation and non-purulent cellulitis.  84y year old Male with a PMHx significant for CHF, HTN, HLD, CAD, CKD, GERD, anxiety/depression, brought to ED by family, for 3 week history of leg swelling, redness and blistering that has worsened in the last 3 days and found to have cellulitis, ED course c/b respiratory distress after IV fluids thought to be HF exacerbation improving after IV lasix. Now monitoring AHRF, HF exacerbation and non-purulent cellulitis.

## 2024-10-28 NOTE — PROGRESS NOTE ADULT - ATTENDING COMMENTS
84 M with HTN, HLD, CAD s/p remote PCI, CKD (most recent Cr 1.4 in 2021), stroke with residual RUE weakness, who was BIB family for bilateral lower extremity swelling found to be in severe sepsis 2/2 likely RLE cellulitis iso wound, also likely in ADHF.    Remains on cefazolin, per family and patient there is improvement in bilateral LE redness. There is chronic venous stasis changes in bilateral lower extremities with symmetric erythema, RLE with lateral open non-purulent wound and increased swelling compared to LLE. Pitting edema up to mid-shins bilaterally. BNP >8k, previously 600s in 2021. No baseline echo. Pt is ambulatory at baseline using rolating walker, does not ambulate very far but denies changes in exercise tolerance. + orthopnea.     #bilateral LE edema  #severe sepsis 2/2 non-purulent RLE cellulitis   - c/w with IV cefazolin for now  - monitor for clinical improvement    #KALA on CKD - Cr 2.1, no know recent baseline but per PCP was CKD2  - likely sepsis mediated vs CRS  - bladder scan to r/o obstruction  - RBUS    #CAD s/p remote PCI  #type II NSTEMI  #ADHF  #HTN  - ASA  - no echo on file, has not seen cardiologist in many years, no known recent ischemic work up  - CXR without significant pulm vascular congestion  - presentation c/f ADHF, elevated BNP and pitting edema, hypoxia following IVF  - TTE, diuresis  - Cards c/s  - c/w home metop  - hold ACE-I iso KALA  - continue clonidine to prevent rebound hypertension  - wean O2 84 M with HTN, HLD, CAD s/p remote PCI, CKD (most recent Cr 1.4 in 2021), stroke with residual RUE weakness, who was BIB family for bilateral lower extremity swelling found to be in severe sepsis 2/2 likely RLE cellulitis iso wound, also likely in ADHF.    Remains on cefazolin, per family and patient there is improvement in bilateral LE redness. There is chronic venous stasis changes in bilateral lower extremities with symmetric erythema, RLE with lateral open non-purulent wound and increased swelling compared to LLE. Pitting edema up to mid-shins bilaterally. BNP >8k, previously 600s in 2021. No baseline echo. Pt is ambulatory at baseline using rolating walker, does not ambulate very far but denies changes in exercise tolerance. + orthopnea.     #bilateral LE edema  #severe sepsis 2/2 non-purulent RLE cellulitis   - c/w with IV cefazolin for now  - monitor for clinical improvement    #KALA on CKD - Cr 2.1, no know recent baseline but per PCP was CKD2  - +granular casts, +proteinuria  - likely sepsis mediated ATN vs CRS  - bladder scan to r/o obstruction  - RBUS    #CAD s/p remote PCI  #type II NSTEMI  #ADHF  #HTN  - ASA  - no echo on file, has not seen cardiologist in many years, no known recent ischemic work up  - CXR without significant pulm vascular congestion  - presentation c/f ADHF, elevated BNP and pitting edema, hypoxia following IVF  - TTE, diuresis  - Cards c/s  - c/w home metop  - hold ACE-I iso KALA  - continue clonidine to prevent rebound hypertension  - wean O2 84 M with HTN, HLD, CAD s/p remote PCI, CKD (most recent Cr 1.4 in 2021), stroke with residual RUE weakness, who was BIB family for bilateral lower extremity swelling found to be in severe sepsis 2/2 likely RLE cellulitis iso wound, also likely in ADHF.    Remains on cefazolin, per family and patient there is improvement in bilateral LE redness. There is chronic venous stasis changes in bilateral lower extremities with symmetric erythema, RLE with lateral open non-purulent wound and increased swelling compared to LLE. Pitting edema up to mid-shins bilaterally. BNP >8k, previously 600s in 2021. No baseline echo. Pt is ambulatory at baseline using rolating walker, does not ambulate very far but denies changes in exercise tolerance. + orthopnea.     #bilateral LE edema  #severe sepsis 2/2 non-purulent RLE cellulitis   - c/w with IV cefazolin for now  - monitor for clinical improvement  - f/u BCx    #KALA on CKD - Cr 2.1, no know recent baseline but per PCP was CKD2  - +granular casts, +proteinuria  - likely sepsis mediated ATN vs CRS  - bladder scan to r/o obstruction  - RBUS    #CAD s/p remote PCI  #type II NSTEMI  #ADHF  #HTN  - ASA  - no echo on file, has not seen cardiologist in many years, no known recent ischemic work up  - CXR without significant pulm vascular congestion  - presentation c/f ADHF, elevated BNP and pitting edema, hypoxia following IVF  - TTE, diuresis  - Cards c/s  - c/w home metop  - hold ACE-I iso KALA  - continue clonidine to prevent rebound hypertension  - wean O2

## 2024-10-28 NOTE — GOALS OF CARE CONVERSATION - ADVANCED CARE PLANNING - CONVERSATION DETAILS
Patient wishes to be full code at this time. Patient wishes to be full code at this time. Okay with CPR & intubation.

## 2024-10-28 NOTE — PROGRESS NOTE ADULT - PROBLEM SELECTOR PLAN 4
#CKD progression vs KALA on CKD  Pt w/ hx of chronic renal insufficiency (unknown baseline Cr but 1.4 in 2021). Cr 2.12 on this admission. Creatinine clearance 28 on admission. KALA on CKD likely pre-renal in the setting of  likely CHF exacerbation.   - monitor Cr. f/up urine lytes   - Caution w/ fluids given likely flash pulmonary edema s/p 500cc  - renally dose medication:       -  lovenox 30mg for DVT ppx       - hold home gabapentin 600mg until confirm w/ med rec & then renally dose       - cefazolin 1g IV Q12H  - f/u bladder scan to R/O obstruction  - f/u renal US #CKD progression vs KALA on CKD  Pt w/ hx of chronic renal insufficiency (unknown baseline Cr but 1.4 in 2021). Cr 2.12 on this admission. Creatinine clearance 28 on admission. KALA on CKD likely pre-renal in the setting of likely CHF exacerbation.   - monitor Cr. f/up urine lytes   - Caution w/ fluids given likely flash pulmonary edema s/p 500cc  - renally dose medication:       -  lovenox 30mg for DVT ppx       - hold home gabapentin 600mg until confirm w/ med rec & then renally dose       - cefazolin 1g IV Q12H  - f/u bladder scan to R/O obstruction  - f/u renal US #CKD progression vs KALA on CKD  Pt w/ hx of chronic renal insufficiency (unknown baseline Cr but 1.4 in 2021). Cr 2.12 on this admission. Creatinine clearance 28 on admission. KALA on CKD likely pre-renal in the setting of likely CHF exacerbation.   - monitor Cr. f/up urine lytes; f/u FeNa  - Caution w/ fluids given likely flash pulmonary edema s/p 500cc  - renally dose medication:       -  lovenox 30mg for DVT ppx       - hold home gabapentin 600mg until confirm w/ med rec & then renally dose       - cefazolin 1g IV Q12H  - f/u bladder scan to R/O obstruction  - f/u renal US #CKD progression vs KALA on CKD  Pt w/ hx of chronic renal insufficiency (unknown baseline Cr but 1.4 in 2021). Cr 2.12 on this admission. Creatinine clearance 28 on admission. KALA on CKD likely pre-renal in the setting of likely CHF exacerbation.   - monitor Cr. f/up urine lytes; f/u FeNa  - Caution w/ fluids given likely flash pulmonary edema s/p 500cc  - possible cardiorenal syndrome  - renally dose medication:       -  lovenox 30mg for DVT ppx       - hold home gabapentin 600mg until confirm w/ med rec & then renally dose       - cefazolin 1g IV Q12H  - f/u bladder scan to R/O obstruction  - f/u renal US #CKD progression vs KALA on CKD  Pt w/ hx of chronic renal insufficiency (unknown baseline Cr but 1.4 in 2021). Cr 2.12 on this admission. Creatinine clearance 28 on admission. KALA on CKD likely pre-renal in the setting of likely CHF exacerbation.   - monitor Cr. f/up urine lytes; f/u FeNa  - Caution w/ fluids given likely flash pulmonary edema s/p 500cc  - possible cardiorenal syndrome  - renally dose medication:       - lovenox 30mg for DVT ppx       - hold home gabapentin 600mg until confirm w/ med rec & then renally dose       - cefazolin 1g IV Q12H  - f/u bladder scan to R/O obstruction  - f/u renal US #CKD progression vs KALA on CKD  Pt w/ hx of chronic renal insufficiency (most recent creatinine 1.68 2024). Cr 2.12 on this admission. Creatinine clearance 28 on admission. . FeUrea 33.7% suggestive of pre-renal etiology. KALA on CKD likely pre-renal in the setting of likely CHF exacerbation (cardiorenal syndrome).   - Caution w/ fluids given likely flash pulmonary edema s/p 500cc  - nephrology consulted, appreciate recs       - f/u renal US       - fluid restriction 1.2 L/D  - renally dose medication:       - lovenox 30mg for DVT ppx       - holding home gabapentin 600mg until renal improvement  - f/u renal US

## 2024-10-28 NOTE — PROGRESS NOTE ADULT - PROBLEM SELECTOR PLAN 1
#Severe Sepsis 2/2 DARRIUS LE Cellulitis  Met SIRS w/ tachypnea & leukocytosis. Severe sepsis w/ lactic acidosis & end organ damage of elevated troponin, elevated creatinine. Lactate down trending s/p fluids. DARRIUS LE pain and worsened b/l LE swelling and erythema. nonpurulent. Received vanc/zosyn x1 in ED. Patient hospitalised in 2021 w/ similar presentation. Non-purulent drainage on exam today. Severe sepsis 2/2 DARRIUS LE cellulitis  - s/p vancomycin 1000mg, zosyn 3.375mg in ED  - cefazolin 1g IV Q12H (renally dosed; D1 10/27) for 7 days #Severe Sepsis 2/2 DARRIUS LE Cellulitis  Met SIRS w/ tachypnea & leukocytosis. Severe sepsis w/ lactic acidosis & end organ damage of elevated troponin, elevated creatinine. Lactate down trending s/p fluids. DARRIUS LE pain and worsened b/l LE swelling and erythema. nonpurulent. Received vanc/zosyn x1 in ED. Patient hospitalized in 09/2021 w/ similar presentation. Non-purulent drainage on exam today. Severe sepsis 2/2 DARRIUS LE cellulitis  - s/p vancomycin 1000mg, zosyn 3.375mg in ED  - cefazolin 1g IV Q12H (renally dosed; D1 10/27) for 7 days #Severe Sepsis 2/2 DARRIUS LE Cellulitis  Met SIRS w/ tachypnea & leukocytosis. Severe sepsis w/ lactic acidosis & end organ damage of elevated troponin, elevated creatinine. Lactate down trending (76 to 69) s/p fluids. DARRIUS LE pain and worsened b/l LE swelling and erythema. nonpurulent. Received vanc/zosyn x1 in ED. Patient hospitalized in 09/2021 w/ similar presentation. Non-purulent drainage on exam today. Severe sepsis 2/2 DARRIUS LE cellulitis  - s/p vancomycin 1000mg, zosyn 3.375mg in ED  - cefazolin 1g IV Q12H (renally dosed; D1 10/27) for 7 days #Severe Sepsis 2/2 DARRIUS LE Cellulitis  Met SIRS w/ tachypnea & leukocytosis. Severe sepsis w/ lactic acidosis & end organ damage of elevated troponin, elevated creatinine. Lactate down trending (76 to 69) s/p fluids. DARRIUS LE pain and worsened b/l LE swelling and erythema, nonpurulent. Received vanc/zosyn x1 in ED. Patient hospitalized in 09/2021 w/ similar presentation. Severe sepsis 2/2 DARRIUS LE cellulitis  - s/p vancomycin 1000mg, zosyn 3.375mg in ED  - cefazolin 1g IV Q12H (renally dosed; D1 10/27) for 7 days #Severe Sepsis 2/2 DARRIUS LE Cellulitis  Met SIRS w/ tachypnea & leukocytosis. Severe sepsis w/ lactic acidosis & end organ damage of elevated troponin, elevated creatinine. Lactate down trending (2.3 to 2.1) s/p fluids. DARRIUS LE pain and worsened b/l LE swelling and erythema, nonpurulent. Received vanc/zosyn x1 in ED. Patient hospitalized in 09/2021 w/ similar presentation. Severe sepsis 2/2 DARRIUS LE cellulitis  - c/w cefazolin 1g IV Q12H (renally dosed; D1 10/27) for 7 days #Severe Sepsis 2/2 DARRIUS LE Cellulitis  Met SIRS w/ tachypnea & leukocytosis. Severe sepsis w/ lactic acidosis & end organ damage of elevated troponin, elevated creatinine. Lactate down trending (2.3 to 2.1) s/p fluids. DARRIUS LE pain and worsened b/l LE swelling and erythema, nonpurulent. Received vanc/zosyn x1 in ED. Patient hospitalized in 09/2021 w/ similar presentation.   - c/w cefazolin 1g IV Q12H (renally dosed; D1 10/27) for 7 days #Severe Sepsis 2/2 DARRIUS LE Cellulitis  Lactate down trending (2.3 to 2.1) s/p fluids. DARRIUS LE pain and worsened b/l LE swelling and erythema, nonpurulent. Received vanc/zosyn x1 in ED. Patient hospitalized in 09/2021 w/ similar presentation.   - c/w cefazolin 1g IV Q12H (renally dosed; D1 10/27) for 7 days #Severe Sepsis 2/2 DARRIUS LE Cellulitis  Lactate down trending (2.3 to 2.1) s/p fluids. DARRIUS LE pain and worsened b/l LE swelling and erythema, nonpurulent. Received vanc/zosyn x1 in ED. Patient hospitalized in 09/2021 w/ similar presentation. Initially on cefazolin 1g IV Q12H (renally dosed; D1 10/27), switched to vancomycin given cultures growing gram positive cocci in clusters.   - Abx: vancomycin 1000mg Q24H (d1 10/28)        - s/p cefazolin 1g IV Q12H (renally dosed; D1 10/27)

## 2024-10-28 NOTE — PROGRESS NOTE ADULT - PROBLEM SELECTOR PLAN 12
Fluids: s/p 500cc NS bolus w/ flash pulm edema; caution w/ fluids  Electrolytes: replete as needed  Nutrition: DASH diet  PPI ppx: pantoprazole 40mg QD  Dvt ppx: lovenox 30mg (renally dosed)   Activity: as tolerated Fluids: s/p 500cc NS bolus w/ flash pulm edema; caution w/ fluids  Electrolytes: replete as needed  Nutrition: DASH diet  PPI ppx: pantoprazole 40mg QD  Dvt ppx: lovenox 30mg (renally dosed)   Activity: as tolerated, PT and OT

## 2024-10-29 ENCOUNTER — RESULT REVIEW (OUTPATIENT)
Age: 84
End: 2024-10-29

## 2024-10-29 ENCOUNTER — TRANSCRIPTION ENCOUNTER (OUTPATIENT)
Age: 84
End: 2024-10-29

## 2024-10-29 LAB
ANION GAP SERPL CALC-SCNC: 7 MMOL/L — SIGNIFICANT CHANGE UP (ref 5–17)
ANISOCYTOSIS BLD QL: SLIGHT — SIGNIFICANT CHANGE UP
BASOPHILS # BLD AUTO: 0 K/UL — SIGNIFICANT CHANGE UP (ref 0–0.2)
BASOPHILS NFR BLD AUTO: 0 % — SIGNIFICANT CHANGE UP (ref 0–2)
BUN SERPL-MCNC: 37 MG/DL — HIGH (ref 7–23)
CALCIUM SERPL-MCNC: 8.5 MG/DL — SIGNIFICANT CHANGE UP (ref 8.4–10.5)
CHLORIDE SERPL-SCNC: 98 MMOL/L — SIGNIFICANT CHANGE UP (ref 96–108)
CO2 SERPL-SCNC: 38 MMOL/L — HIGH (ref 22–31)
CREAT ?TM UR-MCNC: 34 MG/DL — SIGNIFICANT CHANGE UP
CREAT SERPL-MCNC: 2.07 MG/DL — HIGH (ref 0.5–1.3)
EGFR: 31 ML/MIN/1.73M2 — LOW
EOSINOPHIL # BLD AUTO: 0.06 K/UL — SIGNIFICANT CHANGE UP (ref 0–0.5)
EOSINOPHIL NFR BLD AUTO: 0.9 % — SIGNIFICANT CHANGE UP (ref 0–6)
GIANT PLATELETS BLD QL SMEAR: PRESENT — SIGNIFICANT CHANGE UP
GLUCOSE SERPL-MCNC: 94 MG/DL — SIGNIFICANT CHANGE UP (ref 70–99)
HCT VFR BLD CALC: 40.7 % — SIGNIFICANT CHANGE UP (ref 39–50)
HGB BLD-MCNC: 12.2 G/DL — LOW (ref 13–17)
HYPOCHROMIA BLD QL: SIGNIFICANT CHANGE UP
LYMPHOCYTES # BLD AUTO: 0.62 K/UL — LOW (ref 1–3.3)
LYMPHOCYTES # BLD AUTO: 9.1 % — LOW (ref 13–44)
MACROCYTES BLD QL: SLIGHT — SIGNIFICANT CHANGE UP
MAGNESIUM SERPL-MCNC: 1.7 MG/DL — SIGNIFICANT CHANGE UP (ref 1.6–2.6)
MANUAL SMEAR VERIFICATION: SIGNIFICANT CHANGE UP
MCHC RBC-ENTMCNC: 29.4 PG — SIGNIFICANT CHANGE UP (ref 27–34)
MCHC RBC-ENTMCNC: 30 GM/DL — LOW (ref 32–36)
MCV RBC AUTO: 98.1 FL — SIGNIFICANT CHANGE UP (ref 80–100)
MONOCYTES # BLD AUTO: 0.24 K/UL — SIGNIFICANT CHANGE UP (ref 0–0.9)
MONOCYTES NFR BLD AUTO: 3.6 % — SIGNIFICANT CHANGE UP (ref 2–14)
NEUTROPHILS # BLD AUTO: 5.87 K/UL — SIGNIFICANT CHANGE UP (ref 1.8–7.4)
NEUTROPHILS NFR BLD AUTO: 86.4 % — HIGH (ref 43–77)
OVALOCYTES BLD QL SMEAR: SLIGHT — SIGNIFICANT CHANGE UP
PHOSPHATE SERPL-MCNC: 4.1 MG/DL — SIGNIFICANT CHANGE UP (ref 2.5–4.5)
PLAT MORPH BLD: ABNORMAL
PLATELET # BLD AUTO: 94 K/UL — LOW (ref 150–400)
POLYCHROMASIA BLD QL SMEAR: SLIGHT — SIGNIFICANT CHANGE UP
POTASSIUM SERPL-MCNC: 4.5 MMOL/L — SIGNIFICANT CHANGE UP (ref 3.5–5.3)
POTASSIUM SERPL-SCNC: 4.5 MMOL/L — SIGNIFICANT CHANGE UP (ref 3.5–5.3)
PROT ?TM UR-MCNC: 42 MG/DL — HIGH (ref 0–12)
PROT/CREAT UR-RTO: 1.2 RATIO — HIGH (ref 0–0.2)
RBC # BLD: 4.15 M/UL — LOW (ref 4.2–5.8)
RBC # FLD: 14.4 % — SIGNIFICANT CHANGE UP (ref 10.3–14.5)
RBC BLD AUTO: ABNORMAL
SMUDGE CELLS # BLD: PRESENT — SIGNIFICANT CHANGE UP
SODIUM SERPL-SCNC: 143 MMOL/L — SIGNIFICANT CHANGE UP (ref 135–145)
WBC # BLD: 6.79 K/UL — SIGNIFICANT CHANGE UP (ref 3.8–10.5)
WBC # FLD AUTO: 6.79 K/UL — SIGNIFICANT CHANGE UP (ref 3.8–10.5)

## 2024-10-29 PROCEDURE — 93306 TTE W/DOPPLER COMPLETE: CPT | Mod: 26

## 2024-10-29 PROCEDURE — 93970 EXTREMITY STUDY: CPT | Mod: 26

## 2024-10-29 PROCEDURE — 99233 SBSQ HOSP IP/OBS HIGH 50: CPT

## 2024-10-29 PROCEDURE — 99222 1ST HOSP IP/OBS MODERATE 55: CPT

## 2024-10-29 PROCEDURE — 76775 US EXAM ABDO BACK WALL LIM: CPT | Mod: 26

## 2024-10-29 RX ORDER — METOPROLOL TARTRATE 50 MG
50 TABLET ORAL EVERY 24 HOURS
Refills: 0 | Status: DISCONTINUED | OUTPATIENT
Start: 2024-10-30 | End: 2024-11-01

## 2024-10-29 RX ORDER — FUROSEMIDE 40 MG
40 TABLET ORAL EVERY 12 HOURS
Refills: 0 | Status: COMPLETED | OUTPATIENT
Start: 2024-10-29 | End: 2024-10-30

## 2024-10-29 RX ORDER — DAPTOMYCIN 500 MG/10ML
500 INJECTION, POWDER, LYOPHILIZED, FOR SOLUTION INTRAVENOUS EVERY 24 HOURS
Refills: 0 | Status: DISCONTINUED | OUTPATIENT
Start: 2024-10-29 | End: 2024-11-01

## 2024-10-29 RX ORDER — MAGNESIUM SULFATE IN 0.9% NACL 2 G/50 ML
2 INTRAVENOUS SOLUTION, PIGGYBACK (ML) INTRAVENOUS ONCE
Refills: 0 | Status: COMPLETED | OUTPATIENT
Start: 2024-10-29 | End: 2024-10-29

## 2024-10-29 RX ADMIN — Medication 30 MILLIGRAM(S): at 09:22

## 2024-10-29 RX ADMIN — Medication 40 MILLIGRAM(S): at 22:13

## 2024-10-29 RX ADMIN — CLONIDINE HYDROCHLORIDE 0.1 MILLIGRAM(S): 0.2 TABLET ORAL at 18:18

## 2024-10-29 RX ADMIN — DAPTOMYCIN 120 MILLIGRAM(S): 500 INJECTION, POWDER, LYOPHILIZED, FOR SOLUTION INTRAVENOUS at 18:18

## 2024-10-29 RX ADMIN — Medication 40 MILLIGRAM(S): at 07:30

## 2024-10-29 RX ADMIN — Medication 50 MILLIGRAM(S): at 06:06

## 2024-10-29 RX ADMIN — PANTOPRAZOLE SODIUM 40 MILLIGRAM(S): 40 TABLET, DELAYED RELEASE ORAL at 06:06

## 2024-10-29 RX ADMIN — CLONIDINE HYDROCHLORIDE 0.1 MILLIGRAM(S): 0.2 TABLET ORAL at 06:06

## 2024-10-29 RX ADMIN — Medication 1 TABLET(S): at 09:20

## 2024-10-29 RX ADMIN — Medication 25 GRAM(S): at 13:27

## 2024-10-29 RX ADMIN — Medication 40 MILLIGRAM(S): at 18:18

## 2024-10-29 RX ADMIN — Medication 81 MILLIGRAM(S): at 13:27

## 2024-10-29 NOTE — PROGRESS NOTE ADULT - SUBJECTIVE AND OBJECTIVE BOX
SUBJECTIVE:  CRISTOBAL PORRAS is doing well this morning. Today is hospital day 2d.     24 Hour Events:   - No acute overnight events.    ALLERGIES:  No Known Allergies    MEDICATIONS:  STANDING MEDICATIONS  aspirin  chewable 81 milliGRAM(s) Oral daily  atorvastatin 40 milliGRAM(s) Oral at bedtime  cloNIDine 0.1 milliGRAM(s) Oral two times a day  enoxaparin Injectable 30 milliGRAM(s) SubCutaneous every 24 hours  furosemide   Injectable 40 milliGRAM(s) IV Push every 12 hours  metoprolol succinate ER 50 milliGRAM(s) Oral daily  multivitamin 1 Tablet(s) Oral daily  pantoprazole    Tablet 40 milliGRAM(s) Oral before breakfast  vancomycin  IVPB 1000 milliGRAM(s) IV Intermittent every 24 hours    PRN MEDICATIONS  acetaminophen     Tablet .. 650 milliGRAM(s) Oral every 6 hours PRN  ondansetron Injectable 4 milliGRAM(s) IV Push every 8 hours PRN    VITALS:   ICU Vital Signs Last 24 Hrs  T(C): 36.6 (29 Oct 2024 05:30), Max: 36.6 (29 Oct 2024 05:30)  T(F): 97.9 (29 Oct 2024 05:30), Max: 97.9 (29 Oct 2024 05:30)  HR: 69 (29 Oct 2024 05:30) (64 - 69)  BP: 156/84 (29 Oct 2024 05:30) (129/75 - 156/84)  BP(mean): --  ABP: --  ABP(mean): --  RR: 18 (29 Oct 2024 05:30) (17 - 18)  SpO2: 93% (29 Oct 2024 06:40) (93% - 100%)    O2 Parameters below as of 28 Oct 2024 20:52  Patient On (Oxygen Delivery Method): nasal cannula  O2 Flow (L/min): 2      PHYSICAL EXAM  General: well appearing, mumbled speech & in no acute distress    HEENT: NC/AT, sclera anicteric, conjunctiva clear, mucus membranes moist, no lymphadenopathy  Lungs: anterior and posterior lung fields clear to auscultation bilaterally, no wheezes, rhonchi or rales   Heart: regular rate and rhythm, normal S1 S2, no murmurs/rubs/gallops    Abdomen: soft, non-tender, non-distended, bowel sounds present   Extremities: b/l 3+ lower extremity edema w/ erythema and hyperpigmented discoloration up to mid-shin, ttp, ulcerations on R lateral leg, no purulent drainage noted, distal pulses 1+ at DP bilaterally   Skin: warm, dry skin on b/l feet, no diaphoresis  Neuro: A&Ox3, mumbled speech, facial features symmetrical, moving all extremities spontaneously with R arm weakness and 2/5 strength to abduction and flexion, sensation intact to light touch at distal upper and lower extremities bilaterally       LABS:                        13.0   9.24  )-----------( 120      ( 28 Oct 2024 05:30 )             41.1     10-28    144  |  101  |  39[H]  ----------------------------<  76  4.2   |  37[H]  |  2.11[H]    Ca    8.8      28 Oct 2024 05:30  Phos  4.8     10-28  Mg     1.8     10-28    TPro  7.3  /  Alb  3.3  /  TBili  1.0  /  DBili  0.2  /  AST  34  /  ALT  17  /  AlkPhos  74  10-27      Urinalysis Basic - ( 28 Oct 2024 11:26 )    Color: Yellow / Appearance: Clear / S.016 / pH: x  Gluc: x / Ketone: Negative mg/dL  / Bili: Negative / Urobili: 0.2 mg/dL   Blood: x / Protein: 300 mg/dL / Nitrite: Negative   Leuk Esterase: Negative / RBC: 3 /HPF / WBC 2 /HPF   Sq Epi: x / Non Sq Epi: 2 /HPF / Bacteria: Negative /HPF          MICRO:    Urinalysis with Rflx Culture (collected 27 Oct 2024 17:00)    Culture - Blood (collected 27 Oct 2024 12:25)  Source: .Blood BLOOD  Gram Stain (28 Oct 2024 13:32):    Growth in aerobic bottle: Gram Positive Cocci in Clusters  Preliminary Report (28 Oct 2024 13:33):    Growth in aerobic bottle: Gram Positive Cocci in Clusters    Direct identification is available within approximately 3-5    hours either by Blood Panel Multiplexed PCR or Direct    MALDI-TOF. Details: [email or web address]  Organism: Blood Culture PCR (28 Oct 2024 14:51)  Organism: Blood Culture PCR (28 Oct 2024 14:51)    Culture - Blood (collected 27 Oct 2024 12:15)  Source: .Blood BLOOD  Gram Stain (28 Oct 2024 22:57):    Growth in aerobic bottle: Gram Positive Cocci in Clusters    Growth in anaerobic bottle: Gram Positive Cocci in Clusters  Preliminary Report (28 Oct 2024 22:57):    Growth in aerobic bottle: Gram Positive Cocci in Clusters    Growth in anaerobic bottle: Gram Positive Cocci in Clusters      CARDS:  CARDIAC MARKERS ( 27 Oct 2024 12:33 )  x     / x     / x     / x     / 3.5 ng/mL        RADIOLOGY:  no new   SUBJECTIVE:  CRISTOBAL PORRAS is doing well this morning. Today is hospital day 2d. Used the CPAP last night. Endorses facial swelling overnight. Has no acute complaints. Denies fever, SOB, abdominal pain, chest pain.     24 Hour Events:   - No acute overnight events.    ALLERGIES:  No Known Allergies    MEDICATIONS:  STANDING MEDICATIONS  aspirin  chewable 81 milliGRAM(s) Oral daily  atorvastatin 40 milliGRAM(s) Oral at bedtime  cloNIDine 0.1 milliGRAM(s) Oral two times a day  enoxaparin Injectable 30 milliGRAM(s) SubCutaneous every 24 hours  furosemide   Injectable 40 milliGRAM(s) IV Push every 12 hours  metoprolol succinate ER 50 milliGRAM(s) Oral daily  multivitamin 1 Tablet(s) Oral daily  pantoprazole    Tablet 40 milliGRAM(s) Oral before breakfast  vancomycin  IVPB 1000 milliGRAM(s) IV Intermittent every 24 hours    PRN MEDICATIONS  acetaminophen     Tablet .. 650 milliGRAM(s) Oral every 6 hours PRN  ondansetron Injectable 4 milliGRAM(s) IV Push every 8 hours PRN    VITALS:   ICU Vital Signs Last 24 Hrs  T(C): 36.6 (29 Oct 2024 05:30), Max: 36.6 (29 Oct 2024 05:30)  T(F): 97.9 (29 Oct 2024 05:30), Max: 97.9 (29 Oct 2024 05:30)  HR: 69 (29 Oct 2024 05:30) (64 - 69)  BP: 156/84 (29 Oct 2024 05:30) (129/75 - 156/84)  BP(mean): --  ABP: --  ABP(mean): --  RR: 18 (29 Oct 2024 05:30) (17 - 18)  SpO2: 93% (29 Oct 2024 06:40) (93% - 100%)    O2 Parameters below as of 28 Oct 2024 20:52  Patient On (Oxygen Delivery Method): nasal cannula  O2 Flow (L/min): 2      PHYSICAL EXAM  General: well appearing, mumbled speech & in no acute distress    HEENT: NC/AT, sclera anicteric, conjunctiva clear, mucus membranes moist, no lymphadenopathy  Lungs: anterior and posterior lung fields clear to auscultation bilaterally, no wheezes, rhonchi or rales   Heart: regular rate and rhythm, normal S1 S2, no murmurs/rubs/gallops    Abdomen: soft, non-tender, non-distended, bowel sounds present   Extremities: b/l 3+ lower extremity edema w/ erythema and hyperpigmented discoloration up to mid-shin, ttp, ulcerations on R lateral leg, no purulent drainage noted, distal pulses 1+ at DP bilaterally   Skin: warm, dry skin on b/l feet, no diaphoresis  Neuro: A&Ox3, mumbled speech, facial features symmetrical, moving all extremities spontaneously with R arm weakness and 2/5 strength to abduction and flexion, sensation intact to light touch at distal upper and lower extremities bilaterally       LABS:                        13.0   9.24  )-----------( 120      ( 28 Oct 2024 05:30 )             41.1     10-28    144  |  101  |  39[H]  ----------------------------<  76  4.2   |  37[H]  |  2.11[H]    Ca    8.8      28 Oct 2024 05:30  Phos  4.8     10-28  Mg     1.8     10-28    TPro  7.3  /  Alb  3.3  /  TBili  1.0  /  DBili  0.2  /  AST  34  /  ALT  17  /  AlkPhos  74  10-27      Urinalysis Basic - ( 28 Oct 2024 11:26 )    Color: Yellow / Appearance: Clear / S.016 / pH: x  Gluc: x / Ketone: Negative mg/dL  / Bili: Negative / Urobili: 0.2 mg/dL   Blood: x / Protein: 300 mg/dL / Nitrite: Negative   Leuk Esterase: Negative / RBC: 3 /HPF / WBC 2 /HPF   Sq Epi: x / Non Sq Epi: 2 /HPF / Bacteria: Negative /HPF          MICRO:    Urinalysis with Rflx Culture (collected 27 Oct 2024 17:00)    Culture - Blood (collected 27 Oct 2024 12:25)  Source: .Blood BLOOD  Gram Stain (28 Oct 2024 13:32):    Growth in aerobic bottle: Gram Positive Cocci in Clusters  Preliminary Report (28 Oct 2024 13:33):    Growth in aerobic bottle: Gram Positive Cocci in Clusters    Direct identification is available within approximately 3-5    hours either by Blood Panel Multiplexed PCR or Direct    MALDI-TOF. Details: [email or web address]  Organism: Blood Culture PCR (28 Oct 2024 14:51)  Organism: Blood Culture PCR (28 Oct 2024 14:51)    Culture - Blood (collected 27 Oct 2024 12:15)  Source: .Blood BLOOD  Gram Stain (28 Oct 2024 22:57):    Growth in aerobic bottle: Gram Positive Cocci in Clusters    Growth in anaerobic bottle: Gram Positive Cocci in Clusters  Preliminary Report (28 Oct 2024 22:57):    Growth in aerobic bottle: Gram Positive Cocci in Clusters    Growth in anaerobic bottle: Gram Positive Cocci in Clusters      CARDS:  CARDIAC MARKERS ( 27 Oct 2024 12:33 )  x     / x     / x     / x     / 3.5 ng/mL        RADIOLOGY:  no new   SUBJECTIVE:  CRISTOBAL PORRAS is doing well this morning. Today is hospital day 2d. Used the CPAP last night. Endorses facial swelling overnight. Has no acute complaints. Denies fever, SOB, abdominal pain, chest pain.     24 Hour Events:   - No acute overnight events.    ALLERGIES:  No Known Allergies    MEDICATIONS:  STANDING MEDICATIONS  aspirin  chewable 81 milliGRAM(s) Oral daily  atorvastatin 40 milliGRAM(s) Oral at bedtime  cloNIDine 0.1 milliGRAM(s) Oral two times a day  enoxaparin Injectable 30 milliGRAM(s) SubCutaneous every 24 hours  furosemide   Injectable 40 milliGRAM(s) IV Push every 12 hours  metoprolol succinate ER 50 milliGRAM(s) Oral daily  multivitamin 1 Tablet(s) Oral daily  pantoprazole    Tablet 40 milliGRAM(s) Oral before breakfast  vancomycin  IVPB 1000 milliGRAM(s) IV Intermittent every 24 hours    PRN MEDICATIONS  acetaminophen     Tablet .. 650 milliGRAM(s) Oral every 6 hours PRN  ondansetron Injectable 4 milliGRAM(s) IV Push every 8 hours PRN    VITALS:   ICU Vital Signs Last 24 Hrs  T(C): 36.6 (29 Oct 2024 05:30), Max: 36.6 (29 Oct 2024 05:30)  T(F): 97.9 (29 Oct 2024 05:30), Max: 97.9 (29 Oct 2024 05:30)  HR: 69 (29 Oct 2024 05:30) (64 - 69)  BP: 156/84 (29 Oct 2024 05:30) (129/75 - 156/84)  BP(mean): --  ABP: --  ABP(mean): --  RR: 18 (29 Oct 2024 05:30) (17 - 18)  SpO2: 93% (29 Oct 2024 06:40) (93% - 100%)    O2 Parameters below as of 28 Oct 2024 20:52  Patient On (Oxygen Delivery Method): nasal cannula  O2 Flow (L/min): 2      PHYSICAL EXAM  General: well appearing, mumbled speech & in no acute distress    HEENT: NC/AT, sclera anicteric, conjunctiva clear, mucus membranes moist, juan carlos-orbital swelling appreciated  Lungs: anterior and posterior lung fields clear to auscultation bilaterally, no wheezes, rhonchi or rales   Heart: regular rate and rhythm, normal S1 S2, no murmurs/rubs/gallops    Abdomen: soft, non-tender, non-distended, bowel sounds present   Extremities: b/l 3+ lower extremity edema w/ erythema and hyperpigmented discoloration up to mid-shin, ttp, ulcerations on R lateral leg, no purulent drainage noted, distal pulses 1+ at DP bilaterally   Skin: warm, dry skin on b/l feet, no diaphoresis  Neuro: A&Ox3, mumbled speech, facial features symmetrical, moving all extremities spontaneously with R arm weakness and 2/5 strength to abduction and flexion, sensation intact to light touch at distal upper and lower extremities bilaterally       LABS:                        13.0   9.24  )-----------( 120      ( 28 Oct 2024 05:30 )             41.1     10-28    144  |  101  |  39[H]  ----------------------------<  76  4.2   |  37[H]  |  2.11[H]    Ca    8.8      28 Oct 2024 05:30  Phos  4.8     10-28  Mg     1.8     10-28    TPro  7.3  /  Alb  3.3  /  TBili  1.0  /  DBili  0.2  /  AST  34  /  ALT  17  /  AlkPhos  74  10-27      Urinalysis Basic - ( 28 Oct 2024 11:26 )    Color: Yellow / Appearance: Clear / S.016 / pH: x  Gluc: x / Ketone: Negative mg/dL  / Bili: Negative / Urobili: 0.2 mg/dL   Blood: x / Protein: 300 mg/dL / Nitrite: Negative   Leuk Esterase: Negative / RBC: 3 /HPF / WBC 2 /HPF   Sq Epi: x / Non Sq Epi: 2 /HPF / Bacteria: Negative /HPF          MICRO:    Urinalysis with Rflx Culture (collected 27 Oct 2024 17:00)    Culture - Blood (collected 27 Oct 2024 12:25)  Source: .Blood BLOOD  Gram Stain (28 Oct 2024 13:32):    Growth in aerobic bottle: Gram Positive Cocci in Clusters  Preliminary Report (28 Oct 2024 13:33):    Growth in aerobic bottle: Gram Positive Cocci in Clusters    Direct identification is available within approximately 3-5    hours either by Blood Panel Multiplexed PCR or Direct    MALDI-TOF. Details: [email or web address]  Organism: Blood Culture PCR (28 Oct 2024 14:51)  Organism: Blood Culture PCR (28 Oct 2024 14:51)    Culture - Blood (collected 27 Oct 2024 12:15)  Source: .Blood BLOOD  Gram Stain (28 Oct 2024 22:57):    Growth in aerobic bottle: Gram Positive Cocci in Clusters    Growth in anaerobic bottle: Gram Positive Cocci in Clusters  Preliminary Report (28 Oct 2024 22:57):    Growth in aerobic bottle: Gram Positive Cocci in Clusters    Growth in anaerobic bottle: Gram Positive Cocci in Clusters      CARDS:  CARDIAC MARKERS ( 27 Oct 2024 12:33 )  x     / x     / x     / x     / 3.5 ng/mL        RADIOLOGY:  no new

## 2024-10-29 NOTE — PROGRESS NOTE ADULT - PROBLEM SELECTOR PLAN 12
Fluids: s/p 500cc NS bolus w/ flash pulm edema; caution w/ fluids  Electrolytes: replete as needed  Nutrition: DASH diet  PPI ppx: pantoprazole 40mg QD  Dvt ppx: lovenox 30mg (renally dosed)   Activity: as tolerated, PT and OT Fluids: s/p 500cc NS bolus w/ flash pulm edema; caution w/ fluids  Electrolytes: replete as needed  Nutrition: DASH diet  PPI ppx: pantoprazole 40mg QD  Dvt ppx: lovenox 30mg (renally dosed)   Activity: as tolerated, PT and OT  Dispo: PT/OT recommending MARIAH

## 2024-10-29 NOTE — PROGRESS NOTE ADULT - PROBLEM SELECTOR PLAN 1
#Severe Sepsis 2/2 DARRIUS LE Cellulitis  Lactate down trending (2.3 to 2.1) s/p fluids. DARRIUS LE pain and worsened b/l LE swelling and erythema, nonpurulent. Received vanc/zosyn x1 in ED. Patient hospitalized in 09/2021 w/ similar presentation. Initially on cefazolin 1g IV Q12H (renally dosed; D1 10/27), switched to vancomycin given cultures growing staph epidermidis methicillin resistant  - Abx: vancomycin 1000mg Q24H (d1 10/28)        - s/p cefazolin 1g IV Q12H (renally dosed; D1 10/27)  - F/U repeat blood culture 10/28 #Severe Sepsis 2/2 DARRIUS LE Cellulitis  Lactate down trending (2.3 to 2.1) s/p fluids. DARRIUS LE pain and worsened b/l LE swelling and erythema, nonpurulent. DARRIUS LE U/S negative for DVTs. Received vanc/zosyn x1 in ED. Patient hospitalized in 09/2021 w/ similar presentation. Initially on cefazolin 1g IV Q12H (renally dosed; D1 10/27), switched to vancomycin given cultures growing staph epidermidis methicillin resistant x2 cultures.  - Abx: vancomycin 1000mg Q24H (d1 10/27)        - s/p cefazolin 1g IV Q12H x1 (renally dosed; D1 10/27)  - F/U repeat blood culture 10/28  - ID consulted, taty [email or web address]

## 2024-10-29 NOTE — DISCHARGE NOTE PROVIDER - NSDCFUSCHEDAPPT_GEN_ALL_CORE_FT
Slava Green  [facility]  [id number]  Scheduled Appointment: 11/12/2024

## 2024-10-29 NOTE — PHYSICAL THERAPY INITIAL EVALUATION ADULT - ADDITIONAL COMMENTS
Pt lives w/ girlfriend in an elevator access apt. PTA pt ambulates w/ rollator, also has a cane. Pt requires assistance for some ADLs/IADLs from girlfriend and family members. Pts bathroom has grab bars and he reports he is getting a shower chair.

## 2024-10-29 NOTE — OCCUPATIONAL THERAPY INITIAL EVALUATION ADULT - ADDITIONAL COMMENTS
Pt states that he lives w/ his S.O. in apt w/ elevator access. Pt reports that he was mostly independent prior to admission (friends and family assisted where nec). Pt uses a rollator for ambulation. No other AEs/DMEs reported.

## 2024-10-29 NOTE — PROGRESS NOTE ADULT - PROBLEM SELECTOR PLAN 7
- c/w metoprolol 50mg QD (switched from home BID)  - c/w clonidine 0.1mg BID to prevent refractory HTN  - held home fosinopril 40mg & home HCTZ 12.5 mg qd given KALA

## 2024-10-29 NOTE — PROGRESS NOTE ADULT - PROBLEM SELECTOR PLAN 2
Patient showing improvement in hypoxemia. In the setting of b/l LE edema and a likely hx of CHF (unconfirmed). Patient also reports 70 pack years of smoking. No wheezes or crackles on exam.   - now resolving   - c/w diuresis below  - strict I/Os   - Wean O2 as tolerated (currently 95% o2 sat on 2L NC)

## 2024-10-29 NOTE — OCCUPATIONAL THERAPY INITIAL EVALUATION ADULT - GENERAL OBSERVATIONS, REHAB EVAL
Pt's RN Holger aware of intent to eval/tx; cleared Pt. PT Swetha present. Pt received in supine - +02NC 2L, heplock, bed alarm, RLE dressing. Pt agreeable to eval.

## 2024-10-29 NOTE — CONSULT NOTE ADULT - ATTENDING COMMENTS
84M with hx of CAD s/p PCI, CHF, CVA, CKD, HTN, HLD, and mood disorder who was admitted on 10/27 after presented with R > L leg swelling and redness x ~2-3 weeks, without any systemic symptoms of infection. 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 Received vanc/zosyn in ED -> cefazolin -> back to vancomycin after BCx resulted. Repeat BCx are pending. Pending TTE because also being treated for ADHF. ID consulted to assist with management. On exam patient has very mild RLE cellulitis associated with a skin tear of anterior shin. He has stasis dermatitis of LLE. He and his wife at bedside reports significant improvement in bilateral leg swelling with diuresis and also resolving RLE redness. He denies any endovascular prosthetic material (no arterial grafts, no cardiac devices, no prosthetic valves). Only surgery was a hernia repair ~2 years ago, and no issues in that area. This S.haemolyticus may be a contaminant, but will treat as real as it is in both sets and his RLE skin tear/cellulitis is possible source. No concern on exam for deeper infection of RLE – the cellulitis is very mild. Will stop vanc (old age, dynamic kidney function) and give daptomycin 500mg IV q24h. Follow up serial BCx. Anticipate 7 day course of abx, with stepdown to PO upon discharge.
Patient is an 83 yo Male with PMHx of CAD with remote PCI, CHF (presumed systolic), Stroke (residual RUE weakness), HTN, HLD, CKD (baseline Cr 1.6), GERD, Anxiety/Depression  brought to ED by family for b/l LE swelling, redness, and oozing-he admitted to [facility] for  non-purulent cellulitis found to be in Acute Heart Failure Exacerbation for which Cardiology was consulted    Review of Studies:  - ECG10/28/24 : NSR w/ LAD, RBBB, and LVH by aVL criteria    Outpatient Providers: PCP Dr. Stone Allen    Home Medications: Metoprolol succinate ER 50mg BID, HCTZ 12.5mg QD, Clonidine 0.1mg BID, Atorvastatin 40mg HS, Fosinopril 40mg QD    # Acute on Chronic Heart Failure Exacerbation   # ASCVD: CAD; CVA  - Patient is a poor historian. History if mostly taken from his wife and kids at bedside  - Both report that patient carries a diagnosis of '' a weak heart'' managed by his PCP. They recall Remote stress test and revascularization but are unable to detail patient's cardiac history further  - In the months leading up to hospitalization patient has reported progressive worsening Dyspnea and orthopnea making ADLS difficult. In the weeks leading up to Hospitalization patient was sleeping with numerous pillows in a seated position due to pronounced orthopnea. In the past week he has had worsening lower extremity edema prompting them to bring him to the hospital  - Clinically patient is warm, well perfused and compensated with elevated JVP around 10 cm, clear lungs and 3+ pitting lower extremity edema  - Labs revealed KALA on CKD and BNP of 8 K. LFTS were within NML  - At this time, clinically patient is in acute heart failure exacerbation (Unknown Phenotype)  - Would diurese with 40 IV BID of lasix and monitor UP. Goal would be to maintain patient net neg 1-1.5 L in the next 24 hours  - Given Active diurese would maintain on Toprol 50 mg po Daily and NOT BID. Please ensure patient does not get evening Home dose  - Please obtain Echo for structural assessment. With marked LV dysfunction or valvular heart disease will initiate afterload reduction therapy  - Please obtain daily weight. Weight documented at 79.8 on 10/27  - From a CAD standpoint please continue with ASA 81mg  and high intensity statin atorvastatin 40mg QD  - Please obtain collateral cardiac hx from PCP (LHC/RHC, TTE, stress tests)  - Please add on an a1c and lipid panel to am labs  - Cardiology will cont to follow with you, please call with any questions

## 2024-10-29 NOTE — PHYSICAL THERAPY INITIAL EVALUATION ADULT - PERTINENT HX OF CURRENT PROBLEM, REHAB EVAL
84y year old Male with a PMHx significant for CHF, HTN, HLD, CAD, CKD, GERD, anxiety/depression, brought to ED by family, for 3 week history of leg swelling, redness and blistering that has worsened in the last 3 days and found to have cellulitis, ED course c/b respiratory distress after IV fluids thought to be HF exacerbation improving after IV lasix. Now monitoring AHRF, HF exacerbation and non-purulent cellulitis.

## 2024-10-29 NOTE — PHYSICAL THERAPY INITIAL EVALUATION ADULT - RANGE OF MOTION EXAMINATION, REHAB EVAL
RUE shoulder flexion/abduction limited <90degrees/Left LE ROM was WFL (within functional limits)/bilateral lower extremity ROM was WFL (within functional limits)

## 2024-10-29 NOTE — CONSULT NOTE ADULT - ASSESSMENT
84M w/ PMH of HTN, HLD, CAD, CHF, CKD, GERD, anxiety/depression, brought to ED by family, for 1w history of leg swelling, redness and blisters found to have cellulitis, ED course c/b respiratory distress after IV fluids thought to be HF exacerbation improving after IV lasix. ID consulted as patient noted to have gram positive cocci in clusters in both sets of blood cultures (4/4 culture bottles); methicillin resistant staph epidermidis identified in aerobic bottle (1/4).      #MRSA bacteremia             ID team 1  84M w/ PMH of HTN, HLD, CAD, CHF, CKD, GERD, anxiety/depression, brought to ED by family, for 1w history of leg swelling, redness and blisters found to have cellulitis, ED course c/b respiratory distress after IV fluids thought to be HF exacerbation improving after IV lasix. ID consulted as patient noted to have gram positive cocci in clusters in both sets of blood cultures (4/4 culture bottles); methicillin resistant staph epidermidis identified in aerobic bottle (1/4).      #Methicillin resistant staph epidermidis:  Patient noted to have gram positive cocci in clusters in both sets of blood cultures (4/4 culture bottles); methicillin resistant staph epidermidis identified in aerobic bottle (1/4). Noted to have right sided lower extremity cellulitis and b/l dermatitis in the setting of fluid retention from CHF. Unlikely systemic bacteremia from clean lesion noted on RLE however repeat blood cultures warranted.  - follow repeat blood cultures  - obtain TTE  - c/w daptomycin 500 mg QD      ID Team 1 will continue to follow. Recommendations final pending attending attestation.           ID team 1  84M w/ PMH of HTN, HLD, CAD, CHF, CKD, GERD, anxiety/depression, brought to ED by family, for 1w history of leg swelling, redness and blisters found to have cellulitis, ED course c/b respiratory distress after IV fluids thought to be HF exacerbation improving after IV lasix. ID consulted as patient noted to have gram positive cocci in clusters in both sets of blood cultures (4/4 culture bottles); methicillin resistant staph epidermidis identified in aerobic bottle (1/4).      #GPC in BCx  Patient noted to have gram positive cocci in clusters in both sets of blood cultures (4/4 culture bottles); methicillin resistant staph epidermidis identified in aerobic bottle (1/4). Noted to have right sided lower extremity cellulitis and b/l dermatitis in the setting of fluid retention from CHF. Unlikely systemic bacteremia from clean lesion noted on RLE however repeat blood cultures warranted.  - follow repeat blood cultures  - c/w daptomycin 500 mg QD      ID Team 1 will continue to follow. Recommendations final pending attending attestation.          [Time Spent: ___ minutes] : I have spent [unfilled] minutes of time on the encounter. [>50% of the face to face encounter time was spent on counseling and/or coordination of care for ___] : Greater than 50% of the face to face encounter time was spent on counseling and/or coordination of care for [unfilled] [FreeTextEntry3] : seen with NP

## 2024-10-29 NOTE — CONSULT NOTE ADULT - ASSESSMENT
I M    84 y o  Male with a PMHx significant for CHF, HTN, HLD, CAD, CKD, GERD, anxiety/depression, brought to ED by family, for 3 week history of leg swelling, redness and blistering that has worsened in the last 3 days and found to have cellulitis, ED course c/b respiratory distress after IV fluids thought to be HF exacerbation improving after IV lasix. Now monitoring AHRF, HF exacerbation and non-purulent cellulitis.     Problem/Plan - 1:  ·  Problem: Severe sepsis.   ·  Plan: #Severe Sepsis 2/2 DARRIUS LE Cellulitis  Lactate down trending (2.3 to 2.1) s/p fluids. DARRIUS LE pain and worsened b/l LE swelling and erythema, nonpurulent. Received vanc/zosyn x1 in ED. Patient hospitalized in 09/2021 w/ similar presentation. Initially on cefazolin 1g IV Q12H (renally dosed; D1 10/27), switched to vancomycin given cultures growing staph epidermidis methicillin resistant  - Abx: vancomycin 1000mg Q24H (d1 10/28)        - s/p cefazolin 1g IV Q12H (renally dosed; D1 10/27)  - F/U repeat blood culture 10/28.    Problem/Plan - 2:  ·  Problem: Acute hypoxic respiratory failure.   ·  Plan: Patient showing improvement in hypoxemia. In the setting of b/l LE edema and a likely hx of CHF (unconfirmed). Patient also reports 70 pack years of smoking. No wheezes or crackles on exam.   - now resolving   - c/w diuresis below  - strict I/Os   - Wean O2 as tolerated (currently 95% o2 sat on 2L NC).    Problem/Plan - 3:  ·  Problem: Acute CHF.   ·  Plan: Likely acute on chronic CHF in setting of IV fluid administration. On exam, chest clear to auscultation w/ b/l lower extremity edema to mid-shins, erythema and tenderness. No previous echo available. Labs remarkable for BNP of 8.6k, Trop still elevated, normal CKMB. CXR revealed clear lung fields. EKG w/ known RBBB/LAFB. New T-wave inversions noted on EKG today on V4/V5 compared to 2021. Pt denies CP. trops mildly elevated iso KALA, CK/CKMB wnl. Per OP cardiologist, Dr. Kwesi Denny ([phone]), lost to f/u in 2015 after cardiac cath performed, no record of heart failure; no previous echos. Appears to be first episode decompensated heart failure episode.   - Cardiology consulted, appreciate recs       - f/u TTE       - lasix 40mg IV BID            - s/p lasix 20mg IVP with improvement of resp status on 10/27            - goal net negative 1-1.5L in 24 hrs       - [facility] metoprolol 50mg to QD (from home BID dose, given active diuresis)        - Strict I/Os       - Daily weights       - c/w ASA 81mg & statin 40mg QD       - f/u A1c & lipid panel  - Wean O2 as tolerated above    #Demand Ischemia  Patient with elevated troponin on admission, downtrending now. No chest pain, SOB. No signs of any ST elevations or depressions on EKG. Elevated troponin likely from demand ischemia in the setting of heart failure exacerbation.    Problem/Plan - 4:  ·  Problem: KALA (acute kidney injury).   ·  Plan: #CKD progression vs KALA on CKD  Pt w/ hx of chronic renal insufficiency (most recent creatinine 1.68 2024). Cr 2.12 on this admission. Creatinine clearance 28 on admission. . FeUrea 33.7% suggestive of pre-renal etiology. KALA on CKD likely pre-renal in the setting of likely CHF exacerbation (cardiorenal syndrome).   - Caution w/ fluids given likely flash pulmonary edema s/p 500cc  - nephrology consulted, appreciate recs       - f/u renal US       - fluid restriction 1.2 L/D  - renally dose medication:       - lovenox 30mg for DVT ppx       - holding home gabapentin 600mg until renal improvement.    Problem/Plan - 5:  ·  Problem: T wave inversion in EKG.   ·  Plan: EKG w/ known RBBB/LAFB. T-wave inversions noted on EKG 10/27 on V4/V5 compared to 2021. Denies CP. Trops downtrending now. CK/CKMB wnl.    - See CHF plan above.    Problem/Plan - 6:  ·  Problem: CAD (coronary artery disease).   ·  Plan: Pt w/ hx of CAD (last cardiac cath 18yrs ago). EKG w/ new T wave inversions (10/27) and previous RBBB, left axis deviation (2021). Denies CP, SOB, palpitations.  - c/w [id number]        - patient says he is taking 325mg aspirin otc at home; will need conversation regarding aspirin prior to discharge  - c/w atorvastatin 40mg.    Problem/Plan - 7:  ·  Problem: HTN (hypertension).   ·  Plan: - c/w metoprolol 50mg QD (switched from home BID)  - c/w clonidine 0.1mg BID to prevent refractory HTN  - held home fosinopril 40mg & home HCTZ 12.5 mg qd given KALA.    Problem/Plan - 8:  ·  Problem: HLD (hyperlipidemia).   ·  Plan: - c/w atorvastatin 40mg.    Problem/Plan - 9:  ·  Problem: GERD (gastroesophageal reflux disease).   ·  Plan: Therapeutic interchange for home omeprazole 20mg --> pantoprazole 40mg QD.    Problem/Plan - 10:  ·  Problem: Anxiety and depression.   ·  Plan; Home doxepin 10mg QD; no therapeutic interchange for this here  - Hold home doxepin 10mg      - Per pharmacy, no withdrawal symptoms if short term.    Problem/Plan - 11:  ·  Problem: PEDRITO on CPAP.   ·  Plan: On previous admission patient was noted to have PEDRITO. Patient reports that he does not use a CPAP machine at home but he is amendable to trying it.   - CPAP overnight if tolerated.    Problem/Plan - 12:  ·  Problem: Prophylactic measure.   ·  Plan: Fluids: s/p 500cc NS bolus w/ flash pulm edema; caution w/ fluids  Electrolytes: replete as needed  Nutrition: DASH diet  PPI ppx: pantoprazole 40mg QD  Dvt ppx: lovenox 30mg (renally dosed)   Activity: as tolerated, PT and OT.

## 2024-10-29 NOTE — DISCHARGE NOTE PROVIDER - CARE PROVIDER_API CALL
Slava Green [facility]  Interventional Cardiology  [address], [address] [place] [address]  Phone: [phone]  Fax: [phone]  Scheduled Appointment: 11/12/2024 04:15 PM

## 2024-10-29 NOTE — CHART NOTE - NSCHARTNOTEFT_GEN_A_CORE
@ 3AM, RN reports swelling in b/l eyes, worse in L side of the face. vss. Went by bedside and examined pt. Per pt, this is normal for him as he has swelling in the face and b/l eyes from time to time, especially when on bipap. Denies SOB, CP. On exam, a/ox3, appearing comfortable, CTAB, no JVD, b/l LE edema.

## 2024-10-29 NOTE — OCCUPATIONAL THERAPY INITIAL EVALUATION ADULT - MD ORDER
BLE swelling w/ redness  Severe Sepsis 2/2 BLE Cellulitis  Acute hypoxic respiratory failure  Acute CHF

## 2024-10-29 NOTE — PROGRESS NOTE ADULT - PROBLEM SELECTOR PLAN 6
Pt w/ hx of CAD (last cardiac cath 18yrs ago). EKG w/ new T wave inversions (10/27) and previous RBBB, left axis deviation (2021). Denies CP, SOB, palpitations.  - c/w [id number]        - patient says he is taking 325mg aspirin otc at home; will need conversation regarding aspirin prior to discharge  - c/w atorvastatin 40mg

## 2024-10-29 NOTE — PROGRESS NOTE ADULT - SUBJECTIVE AND OBJECTIVE BOX
**Incomplete Note**  Cardiology Consult    O/N:  Interval History/HPI: Pt seen and examined at bedside, NAD, denies chest pain/discomfort/pressure. ROS unremarkable   Telemetry:      Review of Systems:  CONSTITUTIONAL:  No weight loss, fever, chills, weakness or fatigue.  HEENT:  Eyes:  No visual loss, blurred vision, double vision or yellow sclerae. Ears, Nose, Throat:  No hearing loss, sneezing, congestion, runny nose or sore throat.  SKIN:  No rash or itching.  CARDIOVASCULAR:  No chest pain, chest pressure or chest discomfort. No palpitations or edema.  RESPIRATORY:  No shortness of breath, cough or sputum.  GASTROINTESTINAL:  No anorexia, nausea, vomiting or diarrhea. No abdominal pain or blood.  GENITOURINARY: No Burning on urination.   NEUROLOGICAL:  see HPI  MUSCULOSKELETAL:  No muscle, back pain, joint pain or stiffness.  HEMATOLOGIC:  No anemia, bleeding or bruising.  LYMPHATICS:  No enlarged nodes. No history of splenectomy.  PSYCHIATRIC:  No history of depression or anxiety.  ENDOCRINOLOGIC:  No reports of sweating, cold or heat intolerance. No polyuria or polydipsia.  ALLERGIES:  No history of asthma, hives, eczema or rhinitis.    OBJECTIVE  T(C): 36.6 (10-29-24 @ 05:30), Max: 36.6 (10-29-24 @ 05:30)  HR: 77 (10-29-24 @ 07:26) (64 - 77)  BP: 147/63 (10-29-24 @ 07:26) (129/75 - 156/84)  RR: 18 (10-29-24 @ 05:30) (17 - 18)  SpO2: 93% (10-29-24 @ 06:40) (93% - 100%)    10-28-24 @ 07:01  -  10-29-24 @ 07:00  --------------------------------------------------------  IN: 0 mL / OUT: 1125 mL / NET: -1125 mL        PHYSICAL EXAM:    Constitutional: resting comfortably in bed; NAD  HEENT: NC/AT, PERRL, EOMI, anicteric sclera, no nasal discharge; uvula midline, no oropharyngeal erythema or exudates; MMM  Neck: supple; no thyromegaly, JVP ? cm H20, JVD +/-  Respiratory: CTA B/L; no W/R/R, no retractions  Cardiac: +S1/S2; RRR; no M/R/G; PMI non-displaced  Gastrointestinal: soft, NT/ND; no rebound or guarding; +BSx4  Extremities: WWP, no clubbing or cyanosis; no peripheral edema  Musculoskeletal: NROM x4; no joint swelling, tenderness or erythema  Vascular: 2+ radial, DP/PT pulses B/L  Dermatologic: skin warm, dry and intact; no rashes, wounds, or scars  Lymphatic: no submandibular or cervical LAD  Neurologic: AAOx3; CNII-XII grossly intact; no focal deficits    LABS:                        12.2   6.79  )-----------( 94       ( 29 Oct 2024 05:30 )             40.7     10-29    143  |  98  |  37[H]  ----------------------------<  94  4.5   |  38[H]  |  2.07[H]    Ca    8.5      29 Oct 2024 05:30  Phos  4.1     10-29  Mg     1.7     10-29    TPro  7.3  /  Alb  3.3  /  TBili  1.0  /  DBili  0.2  /  AST  34  /  ALT  17  /  AlkPhos  74  10-27      Urinalysis Basic - ( 29 Oct 2024 05:30 )    Color: x / Appearance: x / SG: x / pH: x  Gluc: 94 mg/dL / Ketone: x  / Bili: x / Urobili: x   Blood: x / Protein: x / Nitrite: x   Leuk Esterase: x / RBC: x / WBC x   Sq Epi: x / Non Sq Epi: x / Bacteria: x        RADIOLOGY & ADDITIONAL TESTS:  Reviewed .    MEDICATIONS  (STANDING):  aspirin  chewable 81 milliGRAM(s) Oral daily  atorvastatin 40 milliGRAM(s) Oral at bedtime  cloNIDine 0.1 milliGRAM(s) Oral two times a day  enoxaparin Injectable 30 milliGRAM(s) SubCutaneous every 24 hours  furosemide   Injectable 40 milliGRAM(s) IV Push every 12 hours  metoprolol succinate ER 50 milliGRAM(s) Oral daily  multivitamin 1 Tablet(s) Oral daily  pantoprazole    Tablet 40 milliGRAM(s) Oral before breakfast  vancomycin  IVPB 1000 milliGRAM(s) IV Intermittent every 24 hours    MEDICATIONS  (PRN):  acetaminophen     Tablet .. 650 milliGRAM(s) Oral every 6 hours PRN Temp greater or equal to 38C (100.4F), Mild Pain (1 - 3)  ondansetron Injectable 4 milliGRAM(s) IV Push every 8 hours PRN Nausea and/or Vomiting     **Incomplete Note**  Cardiology Consult    O/N: Bcx +Methicillin resistant staph epidermidis. At 3AM, RN reported swelling in b/l eyes, worse in L side of the face, and was evaluated at bedside. Per pt, this is normal for him as he has swelling in the face and b/l eyes from time to time, especially when on bipap. Pt denied SOB, CP. CTAB, no JVD, b/l LE edema at that time.    Interval History/HPI: 84M with PMHx of HTN, HLD, CAD (last PCI 18y ago), ?CHF, CKD (baseline Cr 1.6), GERD, stroke (residual RUE weakness), anxiety/depression who was brought to ED by family for b/l LE swelling, redness, and oozing-he was admitted to [facility] for bilateral lower extremity swelling and non-purulent cellulitis. He was found to be in clinical acute decompensated HF pending TTE for which cardiology was consulted for.     Pt seen and examined at bedside today. Patient was laying comfortably in bed, in NAD, and reports decreased leg tightness and soreness. Patient reports facial swelling from overnight has normalized. Patient denies chest pain/discomfort/pressure. ROS unremarkable.    OBJECTIVE  T(C): 36.6 (10-29-24 @ 05:30), Max: 36.6 (10-29-24 @ 05:30)  HR: 77 (10-29-24 @ 07:26) (64 - 77)  BP: 147/63 (10-29-24 @ 07:26) (129/75 - 156/84)  RR: 18 (10-29-24 @ 05:30) (17 - 18)  SpO2: 93% (10-29-24 @ 06:40) (93% - 100%)    10-28-24 @ 07:01  -  10-29-24 @ 07:00  --------------------------------------------------------  IN: 0 mL / OUT: 1125 mL / NET: -1125 mL        PHYSICAL EXAM:    Constitutional: resting comfortably in bed; NAD  HEENT: NC/AT, PERRL, EOMI, anicteric sclera, no nasal discharge; uvula midline, no oropharyngeal erythema or exudates; MMM  Neck: supple; no thyromegaly, no evidence of elevated JVD  Respiratory: CTA B/L; +audible secretions; no W/R/R, no retractions  Cardiac: +S1/S2; RRR; no M/R/G; PMI non-displaced  Gastrointestinal: soft, NT/ND; no rebound or guarding; +BSx4  Extremities: 2+ b/l LE pitting edema; WWP, no clubbing or cyanosis; no peripheral edema  Musculoskeletal: NROM x4; no joint swelling, tenderness or erythema  Vascular: 2+ radial, DP/PT pulses B/L  Dermatologic: skin warm, dry and intact; no rashes, wounds, or scars  Neurologic: AAOx3    LABS:                        12.2   6.79  )-----------( 94       ( 29 Oct 2024 05:30 )             40.7     10-29    143  |  98  |  37[H]  ----------------------------<  94  4.5   |  38[H]  |  2.07[H]    Ca    8.5      29 Oct 2024 05:30  Phos  4.1     10-29  Mg     1.7     10-29    TPro  7.3  /  Alb  3.3  /  TBili  1.0  /  DBili  0.2  /  AST  34  /  ALT  17  /  AlkPhos  74  10-27      Urinalysis Basic - ( 29 Oct 2024 05:30 )    Color: x / Appearance: x / SG: x / pH: x  Gluc: 94 mg/dL / Ketone: x  / Bili: x / Urobili: x   Blood: x / Protein: x / Nitrite: x   Leuk Esterase: x / RBC: x / WBC x   Sq Epi: x / Non Sq Epi: x / Bacteria: x        RADIOLOGY & ADDITIONAL TESTS:  10/27/24 Chest XR: No consolidations. No pleural effusions. No pneumothorax. Degenerative   changes of the spine. No acute abnormalities of the soft tissues and   osseous structures.    MEDICATIONS  (STANDING):  aspirin  chewable 81 milliGRAM(s) Oral daily  atorvastatin 40 milliGRAM(s) Oral at bedtime  cloNIDine 0.1 milliGRAM(s) Oral two times a day  enoxaparin Injectable 30 milliGRAM(s) SubCutaneous every 24 hours  furosemide   Injectable 40 milliGRAM(s) IV Push every 12 hours  metoprolol succinate ER 50 milliGRAM(s) Oral daily  multivitamin 1 Tablet(s) Oral daily  pantoprazole    Tablet 40 milliGRAM(s) Oral before breakfast  vancomycin  IVPB 1000 milliGRAM(s) IV Intermittent every 24 hours    MEDICATIONS  (PRN):  acetaminophen     Tablet .. 650 milliGRAM(s) Oral every 6 hours PRN Temp greater or equal to 38C (100.4F), Mild Pain (1 - 3)  ondansetron Injectable 4 milliGRAM(s) IV Push every 8 hours PRN Nausea and/or Vomiting     Cardiology Consult    O/N: Bcx +Methicillin resistant staph epidermidis. At 3AM, RN reported swelling in b/l eyes, worse in L side of the face, and was evaluated at bedside. Per pt, this is normal for him as he has swelling in the face and b/l eyes from time to time, especially when on bipap. Pt denied SOB, CP. CTAB, no JVD, b/l LE edema at that time.    Interval History/HPI: 84M with PMHx of HTN, HLD, CAD (last PCI 18y ago), ?CHF, CKD (baseline Cr 1.6), GERD, stroke (residual RUE weakness), anxiety/depression who was brought to ED by family for b/l LE swelling, redness, and oozing-he was admitted to [facility] for bilateral lower extremity swelling and non-purulent cellulitis. He was found to be in clinical acute decompensated HF pending TTE for which cardiology was consulted for.     Pt seen and examined at bedside today. Patient was laying comfortably in bed, in NAD, and reports decreased leg tightness and soreness. Patient reports facial swelling from overnight has normalized. Patient denies chest pain/discomfort/pressure. ROS unremarkable.    OBJECTIVE  T(C): 36.6 (10-29-24 @ 05:30), Max: 36.6 (10-29-24 @ 05:30)  HR: 77 (10-29-24 @ 07:26) (64 - 77)  BP: 147/63 (10-29-24 @ 07:26) (129/75 - 156/84)  RR: 18 (10-29-24 @ 05:30) (17 - 18)  SpO2: 93% (10-29-24 @ 06:40) (93% - 100%)    10-28-24 @ 07:01  -  10-29-24 @ 07:00  --------------------------------------------------------  IN: 0 mL / OUT: 1125 mL / NET: -1125 mL        PHYSICAL EXAM:    Constitutional: resting comfortably in bed; NAD  HEENT: NC/AT, PERRL, EOMI, anicteric sclera, no nasal discharge; uvula midline, no oropharyngeal erythema or exudates; MMM  Neck: supple; no thyromegaly, no evidence of elevated JVD  Respiratory: CTA B/L; +audible secretions; no W/R/R, no retractions  Cardiac: +S1/S2; RRR; no M/R/G; PMI non-displaced  Gastrointestinal: soft, NT/ND; no rebound or guarding; +BSx4  Extremities: 2+ b/l LE pitting edema; WWP, no clubbing or cyanosis;  Musculoskeletal: NROM x4; no joint swelling, tenderness or erythema  Vascular: 2+ radial, DP/PT pulses B/L  Dermatologic: skin warm, dry and intact; no rashes, wounds, or scars  Neurologic: AAOx3    LABS:                        12.2   6.79  )-----------( 94       ( 29 Oct 2024 05:30 )             40.7     10-29    143  |  98  |  37[H]  ----------------------------<  94  4.5   |  38[H]  |  2.07[H]    Ca    8.5      29 Oct 2024 05:30  Phos  4.1     10-29  Mg     1.7     10-29    TPro  7.3  /  Alb  3.3  /  TBili  1.0  /  DBili  0.2  /  AST  34  /  ALT  17  /  AlkPhos  74  10-27      Urinalysis Basic - ( 29 Oct 2024 05:30 )    Color: x / Appearance: x / SG: x / pH: x  Gluc: 94 mg/dL / Ketone: x  / Bili: x / Urobili: x   Blood: x / Protein: x / Nitrite: x   Leuk Esterase: x / RBC: x / WBC x   Sq Epi: x / Non Sq Epi: x / Bacteria: x        RADIOLOGY & ADDITIONAL TESTS:  10/27/24 Chest XR: No consolidations. No pleural effusions. No pneumothorax. Degenerative   changes of the spine. No acute abnormalities of the soft tissues and   osseous structures.    MEDICATIONS  (STANDING):  aspirin  chewable 81 milliGRAM(s) Oral daily  atorvastatin 40 milliGRAM(s) Oral at bedtime  cloNIDine 0.1 milliGRAM(s) Oral two times a day  enoxaparin Injectable 30 milliGRAM(s) SubCutaneous every 24 hours  furosemide   Injectable 40 milliGRAM(s) IV Push every 12 hours  metoprolol succinate ER 50 milliGRAM(s) Oral daily  multivitamin 1 Tablet(s) Oral daily  pantoprazole    Tablet 40 milliGRAM(s) Oral before breakfast  vancomycin  IVPB 1000 milliGRAM(s) IV Intermittent every 24 hours    MEDICATIONS  (PRN):  acetaminophen     Tablet .. 650 milliGRAM(s) Oral every 6 hours PRN Temp greater or equal to 38C (100.4F), Mild Pain (1 - 3)  ondansetron Injectable 4 milliGRAM(s) IV Push every 8 hours PRN Nausea and/or Vomiting     Cardiology Consult    O/N: Bcx +Methicillin resistant staph epidermidis. At 3AM, RN reported swelling in b/l eyes, worse in L side of the face, and was evaluated at bedside. Per pt, this is normal for him as he has swelling in the face and b/l eyes from time to time, especially when on bipap. Pt denied SOB, CP. CTAB, no JVD, b/l LE edema at that time.    Interval History/HPI: 84M with PMHx of HTN, HLD, CAD (last PCI 18y ago), ?CHF, CKD (baseline Cr 1.6), GERD, stroke (residual RUE weakness), anxiety/depression who was brought to ED by family for b/l LE swelling, redness, and oozing-[facility] for bilateral lower extremity swelling and non-purulent cellulitis. He was found to be in acute decompensated HFpEF for which cardiology was consulted for.     Pt seen and examined at bedside today. Patient was laying comfortably in bed, in NAD, and reports decreased leg tightness and soreness. Patient reports facial swelling from overnight has normalized. Patient denies chest pain/discomfort/pressure. ROS unremarkable.    OBJECTIVE  T(C): 36.6 (10-29-24 @ 05:30), Max: 36.6 (10-29-24 @ 05:30)  HR: 77 (10-29-24 @ 07:26) (64 - 77)  BP: 147/63 (10-29-24 @ 07:26) (129/75 - 156/84)  RR: 18 (10-29-24 @ 05:30) (17 - 18)  SpO2: 93% (10-29-24 @ 06:40) (93% - 100%)    10-28-24 @ 07:01  -  10-29-24 @ 07:00  --------------------------------------------------------  IN: 0 mL / OUT: 1125 mL / NET: -1125 mL        PHYSICAL EXAM:    Constitutional: resting comfortably in bed; NAD  HEENT: NC/AT, PERRL, EOMI, anicteric sclera, no nasal discharge; uvula midline, no oropharyngeal erythema or exudates; MMM  Neck: supple; no thyromegaly, no evidence of elevated JVD  Respiratory: CTA B/L; +audible secretions; no W/R/R, no retractions  Cardiac: +S1/S2; RRR; no M/R/G; PMI non-displaced  Gastrointestinal: soft, NT/ND; no rebound or guarding; +BSx4  Extremities: 2+ b/l LE pitting edema; WWP, no clubbing or cyanosis  Musculoskeletal: NROM x4; no joint swelling, tenderness or erythema  Vascular: 2+ radial, DP/PT pulses B/L  Dermatologic: skin warm, dry and intact; no rashes, wounds, or scars  Neurologic: AAOx3    LABS:                        12.2   6.79  )-----------( 94       ( 29 Oct 2024 05:30 )             40.7     10-29    143  |  98  |  37[H]  ----------------------------<  94  4.5   |  38[H]  |  2.07[H]    Ca    8.5      29 Oct 2024 05:30  Phos  4.1     10-29  Mg     1.7     10-29    TPro  7.3  /  Alb  3.3  /  TBili  1.0  /  DBili  0.2  /  AST  34  /  ALT  17  /  AlkPhos  74  10-27      Urinalysis Basic - ( 29 Oct 2024 05:30 )    Color: x / Appearance: x / SG: x / pH: x  Gluc: 94 mg/dL / Ketone: x  / Bili: x / Urobili: x   Blood: x / Protein: x / Nitrite: x   Leuk Esterase: x / RBC: x / WBC x   Sq Epi: x / Non Sq Epi: x / Bacteria: x        RADIOLOGY & ADDITIONAL TESTS:  10/29/24 Echo: EF 50%, mild symmetric LVH, mildly reduced LV systolic function, grade I LV diastolic dysfunction, normal RV size and systolic function, severely dilated LA, aortic sclerosis without significant stenosis, trace aortic regurgitation, PaSP 35mmHg, no pericardial effusion  10/27/24 Chest XR: No consolidations. No pleural effusions. No pneumothorax. Degenerative changes of the spine. No acute abnormalities of the soft tissues and osseous structures.    MEDICATIONS  (STANDING):  aspirin  chewable 81 milliGRAM(s) Oral daily  atorvastatin 40 milliGRAM(s) Oral at bedtime  cloNIDine 0.1 milliGRAM(s) Oral two times a day  enoxaparin Injectable 30 milliGRAM(s) SubCutaneous every 24 hours  furosemide   Injectable 40 milliGRAM(s) IV Push every 12 hours  metoprolol succinate ER 50 milliGRAM(s) Oral daily  multivitamin 1 Tablet(s) Oral daily  pantoprazole    Tablet 40 milliGRAM(s) Oral before breakfast  vancomycin  IVPB 1000 milliGRAM(s) IV Intermittent every 24 hours    MEDICATIONS  (PRN):  acetaminophen     Tablet .. 650 milliGRAM(s) Oral every 6 hours PRN Temp greater or equal to 38C (100.4F), Mild Pain (1 - 3)  ondansetron Injectable 4 milliGRAM(s) IV Push every 8 hours PRN Nausea and/or Vomiting

## 2024-10-29 NOTE — CONSULT NOTE ADULT - SUBJECTIVE AND OBJECTIVE BOX
INFECTIOUS DISEASES INITIAL CONSULT NOTE    HPI:   Patient is a 84YM with PMH significant for HTN, HLD, CAD (last PCI 18y ago), CHF, CKD (Cr 1.4 in 9/21), GERD, stroke (residual RUE weakness), anxiety/depression, brought to ED by girlfriend Tanya, for leg swelling, redness, and oozing. Patient reports that his leg swelling has been on-going for approximately 3 weeks. Has gotten worse in the last 3 days, to the point he felt he needed to come in. Was dropped off by his girlfriend Tanya Lucas, however, she was not at bedside to provide further collateral. Patient has an extensive history of smoking approximately 70 years. Denies any similar episodes to this, although upon chart review, patient was admitted for bilateral lower extremity swelling and left leg non-purulent cellulitis in 9/2021. Denies any history of heart problems, fevers, chills, abdominal pain, chest pain. Endorses shortness of breath with exertion, orthopnea, leg.     ED COURSE  Vitals: T(C): 36.3 (10-27-24 @ 17:14), Max: 36.4 (10-27-24 @ 14:25); HR: 61 (10-27-24 @ 17:14) (61 - 74); BP: 148/94 (10-27-24 @ 17:14) (130/81 - 148/94); RR: 20 (10-27-24 @ 17:14) (18 - 22); SpO2: 96% (10-27-24 @ 17:14) (92% - 97%)  Labs: WBC 13.82 (89.5% neutrophils), lactate 2.3, 2.1 s/p 500cc, creatinine 2.12, .7, ESR 34, BNP 8629, trop 76  EKG: normal sinus w/ left axis deviation, RBBB (seen on EKG 2021), LVH, T wave inversions in V4/V5/V6 (V4/V5 not seen in 2021)  Imaging: CXR wet read showing increased pulmonary vasculature but not pleural effusions or consolidations  Interventions: 500cc 0.9% NS, 20mg lasix IV , 1000mg vanc , 3.375g zosyn  Consults: ICU team for likely plash pulmonary edema 2/2 500cc fluid bolus   (27 Oct 2024 17:21)    ED course c/b respiratory distress after IV fluids thought to be HF exacerbation improving after IV lasix. Met criteria for severe sepsis on admission (WBC 13.82, RR 20-22, lactate 2.3, Cr 2.12). ID consulted as patient noted to have gram positive cocci in clusters in both sets of blood cultures (4/4 culture bottles); methicillin resistant staph epidermidis identified in aerobic bottle (1/4)    PAST MEDICAL & SURGICAL HISTORY:  HTN (hypertension)  Arthritis  GERD (gastroesophageal reflux disease)  Chronic CHF  CAD (coronary artery disease)  Constipation  Depression  Chronic renal insufficiency  Cellulitis, leg  HTN (hypertension)  HLD (hyperlipidemia)  GERD (gastroesophageal reflux disease)  Chronic systolic congestive heart failure  CAD (coronary artery disease)  History of BPH  Constipation  Depression  Anxiety  Chronic renal insufficiency  H/O hernia repair    Review of Systems:   Constitutional, eyes, ENT, cardiovascular, respiratory, gastrointestinal, genitourinary, integumentary, neurological, psychiatric and heme/lymph are otherwise negative other than noted above     ANTIBIOTICS:  MEDICATIONS  (STANDING):  aspirin  chewable 81 milliGRAM(s) Oral daily  atorvastatin 40 milliGRAM(s) Oral at bedtime  cloNIDine 0.1 milliGRAM(s) Oral two times a day  enoxaparin Injectable 30 milliGRAM(s) SubCutaneous every 24 hours  furosemide   Injectable 40 milliGRAM(s) IV Push every 12 hours  metoprolol succinate ER 50 milliGRAM(s) Oral daily  multivitamin 1 Tablet(s) Oral daily  pantoprazole    Tablet 40 milliGRAM(s) Oral before breakfast  vancomycin  IVPB 1000 milliGRAM(s) IV Intermittent every 24 hours    MEDICATIONS  (PRN):  acetaminophen     Tablet .. 650 milliGRAM(s) Oral every 6 hours PRN Temp greater or equal to 38C (100.4F), Mild Pain (1 - 3)  ondansetron Injectable 4 milliGRAM(s) IV Push every 8 hours PRN Nausea and/or Vomiting      Allergies  No Known Allergies  Intolerances        SOCIAL HISTORY:    FAMILY HISTORY:  No pertinent family history in first degree relatives     no FH leading to current infection    Vital Signs Last 24 Hrs  T(C): 37 (29 Oct 2024 09:38), Max: 37 (29 Oct 2024 09:38)  T(F): 98.6 (29 Oct 2024 09:38), Max: 98.6 (29 Oct 2024 09:38)  HR: 70 (29 Oct 2024 09:38) (66 - 77)  BP: 133/76 (29 Oct 2024 09:38) (129/75 - 156/84)  BP(mean): --  RR: 18 (29 Oct 2024 09:38) (17 - 18)  SpO2: 98% (29 Oct 2024 09:38) (93% - 100%)    Parameters below as of 29 Oct 2024 09:38  Patient On (Oxygen Delivery Method): nasal cannula        10-28-24 @ 07:01  -  10-29-24 @ 07:00  --------------------------------------------------------  IN: 0 mL / OUT: 1125 mL / NET: -1125 mL    10-29-24 @ 07:01  -  10-29-24 @ 14:06  --------------------------------------------------------  IN: 0 mL / OUT: 350 mL / NET: -350 mL        PHYSICAL EXAM:  Constitutional: alert, NAD  Eyes: the sclera and conjunctiva were normal.   ENT: the ears and nose were normal in appearance.   Neck: the appearance of the neck was normal and the neck was supple.   Pulmonary: no respiratory distress and lungs were clear to auscultation bilaterally.   Heart: heart rate was normal and rhythm regular, normal S1 and S2  Vascular:. there was no peripheral edema  Abdomen: normal bowel sounds, soft, non-tender  Neurological: no focal deficits.   Psychiatric: the affect was normal      LABS:                        12.2   6.79  )-----------( 94       ( 29 Oct 2024 05:30 )             40.7     10-29    143  |  98  |  37[H]  ----------------------------<  94  4.5   |  38[H]  |  2.07[H]    Ca    8.5      29 Oct 2024 05:30  Phos  4.1     10-29  Mg     1.7     10-29        Urinalysis Basic - ( 29 Oct 2024 05:30 )    Color: x / Appearance: x / SG: x / pH: x  Gluc: 94 mg/dL / Ketone: x  / Bili: x / Urobili: x   Blood: x / Protein: x / Nitrite: x   Leuk Esterase: x / RBC: x / WBC x   Sq Epi: x / Non Sq Epi: x / Bacteria: x        MICROBIOLOGY:    RADIOLOGY & ADDITIONAL STUDIES:   INFECTIOUS DISEASES INITIAL CONSULT NOTE    HPI:   Patient is a 84YM with PMH significant for HTN, HLD, CAD (last PCI 18y ago), CHF, CKD (Cr 1.4 in 9/21), GERD, stroke (residual RUE weakness), anxiety/depression, brought to ED by girlfriend Tanya, for leg swelling, redness, and oozing. Patient reports that his leg swelling has been on-going for approximately 3 weeks. Has gotten worse in the last 3 days, to the point he felt he needed to come in. Was dropped off by his girlfriend Tanya Lucas, however, she was not at bedside to provide further collateral. Patient has an extensive history of smoking approximately 70 years. Denies any similar episodes to this, although upon chart review, patient was admitted for bilateral lower extremity swelling and left leg non-purulent cellulitis in 9/2021. Denies any history of heart problems, fevers, chills, abdominal pain, chest pain. Endorses shortness of breath with exertion, orthopnea, leg.     ED COURSE  Vitals: T(C): 36.3 (10-27-24 @ 17:14), Max: 36.4 (10-27-24 @ 14:25); HR: 61 (10-27-24 @ 17:14) (61 - 74); BP: 148/94 (10-27-24 @ 17:14) (130/81 - 148/94); RR: 20 (10-27-24 @ 17:14) (18 - 22); SpO2: 96% (10-27-24 @ 17:14) (92% - 97%)  Labs: WBC 13.82 (89.5% neutrophils), lactate 2.3, 2.1 s/p 500cc, creatinine 2.12, .7, ESR 34, BNP 8629, trop 76  EKG: normal sinus w/ left axis deviation, RBBB (seen on EKG 2021), LVH, T wave inversions in V4/V5/V6 (V4/V5 not seen in 2021)  Imaging: CXR wet read showing increased pulmonary vasculature but not pleural effusions or consolidations  Interventions: 500cc 0.9% NS, 20mg lasix IV , 1000mg vanc , 3.375g zosyn  Consults: ICU team for likely plash pulmonary edema 2/2 500cc fluid bolus   (27 Oct 2024 17:21)    ED course c/b respiratory distress after IV fluids thought to be HF exacerbation improving after IV lasix. Met criteria for severe sepsis on admission (WBC 13.82, RR 20-22, lactate 2.3, Cr 2.12). ID consulted as patient noted to have gram positive cocci in clusters in both sets of blood cultures (4/4 culture bottles); methicillin resistant staph epidermidis identified in aerobic bottle (1/4). Patient does not have any history of SSTI or hospitalization for infection. No foreign devices in body. Did have a hernia repair two years ago.     On ROS patient denies any CP, SOB, N/V/D/C or abdominal pain. Reports having chills but no subjective fevers.     In terms of social history: lives in the [place] with his wife, no pets. No ETOH or tobacco use. No drug allergies.     PAST MEDICAL & SURGICAL HISTORY:  HTN (hypertension)  Arthritis  GERD (gastroesophageal reflux disease)  Chronic CHF  CAD (coronary artery disease)  Constipation  Depression  Chronic renal insufficiency  Cellulitis, leg  HTN (hypertension)  HLD (hyperlipidemia)  GERD (gastroesophageal reflux disease)  Chronic systolic congestive heart failure  CAD (coronary artery disease)  History of BPH  Constipation  Depression  Anxiety  Chronic renal insufficiency  H/O hernia repair    ANTIBIOTICS:  MEDICATIONS  (STANDING):  aspirin  chewable 81 milliGRAM(s) Oral daily  atorvastatin 40 milliGRAM(s) Oral at bedtime  cloNIDine 0.1 milliGRAM(s) Oral two times a day  enoxaparin Injectable 30 milliGRAM(s) SubCutaneous every 24 hours  furosemide   Injectable 40 milliGRAM(s) IV Push every 12 hours  metoprolol succinate ER 50 milliGRAM(s) Oral daily  multivitamin 1 Tablet(s) Oral daily  pantoprazole    Tablet 40 milliGRAM(s) Oral before breakfast  vancomycin  IVPB 1000 milliGRAM(s) IV Intermittent every 24 hours    MEDICATIONS  (PRN):  acetaminophen     Tablet .. 650 milliGRAM(s) Oral every 6 hours PRN Temp greater or equal to 38C (100.4F), Mild Pain (1 - 3)  ondansetron Injectable 4 milliGRAM(s) IV Push every 8 hours PRN Nausea and/or Vomiting      Allergies  No Known Allergies  Intolerances        SOCIAL HISTORY:    FAMILY HISTORY:  No pertinent family history in first degree relatives     no FH leading to current infection    Vital Signs Last 24 Hrs  T(C): 37 (29 Oct 2024 09:38), Max: 37 (29 Oct 2024 09:38)  T(F): 98.6 (29 Oct 2024 09:38), Max: 98.6 (29 Oct 2024 09:38)  HR: 70 (29 Oct 2024 09:38) (66 - 77)  BP: 133/76 (29 Oct 2024 09:38) (129/75 - 156/84)  BP(mean): --  RR: 18 (29 Oct 2024 09:38) (17 - 18)  SpO2: 98% (29 Oct 2024 09:38) (93% - 100%)    Parameters below as of 29 Oct 2024 09:38  Patient On (Oxygen Delivery Method): nasal cannula        10-28-24 @ 07:01  -  10-29-24 @ 07:00  --------------------------------------------------------  IN: 0 mL / OUT: 1125 mL / NET: -1125 mL    10-29-24 @ 07:01  -  10-29-24 @ 14:06  --------------------------------------------------------  IN: 0 mL / OUT: 350 mL / NET: -350 mL        PHYSICAL EXAM:  Constitutional: alert, NAD;   Eyes: periorbital edema present   Pulmonary: no respiratory distress and lungs were clear to auscultation bilaterally.   Heart: heart rate was normal and rhythm regular, normal S1 and S2; no murmur appreciated   Vascular:. trace edema b/l; R leg appears to have more erythema than L; clean lesion with raw skin without any drainage noted on right shin; Appears to have b/l LE dermatitis and RLE cellulitis   Abdomen: normal bowel sounds, soft, non-tender  Neurological: no focal deficits.   Psychiatric: the affect was normal      LABS:                        12.2   6.79  )-----------( 94       ( 29 Oct 2024 05:30 )             40.7     10-29    143  |  98  |  37[H]  ----------------------------<  94  4.5   |  38[H]  |  2.07[H]    Ca    8.5      29 Oct 2024 05:30  Phos  4.1     10-29  Mg     1.7     10-29        Urinalysis Basic - ( 29 Oct 2024 05:30 )    Color: x / Appearance: x / SG: x / pH: x  Gluc: 94 mg/dL / Ketone: x  / Bili: x / Urobili: x   Blood: x / Protein: x / Nitrite: x   Leuk Esterase: x / RBC: x / WBC x   Sq Epi: x / Non Sq Epi: x / Bacteria: x        MICROBIOLOGY:    RADIOLOGY & ADDITIONAL STUDIES:   INFECTIOUS DISEASES INITIAL CONSULT NOTE    HPI:   Patient is a 84YM with PMH significant for HTN, HLD, CAD (last PCI 18y ago), CHF, CKD (Cr 1.4 in 9/21), GERD, stroke (residual RUE weakness), anxiety/depression, brought to ED by girlfriend Tanya, for leg swelling, redness, and oozing. Patient reports that his leg swelling has been on-going for approximately 3 weeks. Has gotten worse in the last 3 days, to the point he felt he needed to come in. Was dropped off by his girlfriend Tanya Lucas, however, she was not at bedside to provide further collateral. Patient has an extensive history of smoking approximately 70 years. Denies any similar episodes to this, although upon chart review, patient was admitted for bilateral lower extremity swelling and left leg non-purulent cellulitis in 9/2021. Denies any history of heart problems, fevers, chills, abdominal pain, chest pain. Endorses shortness of breath with exertion, orthopnea, leg.     ED COURSE  Vitals: T(C): 36.3 (10-27-24 @ 17:14), Max: 36.4 (10-27-24 @ 14:25); HR: 61 (10-27-24 @ 17:14) (61 - 74); BP: 148/94 (10-27-24 @ 17:14) (130/81 - 148/94); RR: 20 (10-27-24 @ 17:14) (18 - 22); SpO2: 96% (10-27-24 @ 17:14) (92% - 97%)  Labs: WBC 13.82 (89.5% neutrophils), lactate 2.3, 2.1 s/p 500cc, creatinine 2.12, .7, ESR 34, BNP 8629, trop 76  EKG: normal sinus w/ left axis deviation, RBBB (seen on EKG 2021), LVH, T wave inversions in V4/V5/V6 (V4/V5 not seen in 2021)  Imaging: CXR wet read showing increased pulmonary vasculature but not pleural effusions or consolidations  Interventions: 500cc 0.9% NS, 20mg lasix IV , 1000mg vanc , 3.375g zosyn  Consults: ICU team for likely plash pulmonary edema 2/2 500cc fluid bolus   (27 Oct 2024 17:21)    ED course c/b respiratory distress after IV fluids thought to be HF exacerbation improving after IV lasix. Met criteria for severe sepsis on admission (WBC 13.82, RR 20-22, lactate 2.3, Cr 2.12). ID consulted as patient noted to have gram positive cocci in clusters in both sets of blood cultures (4/4 culture bottles); methicillin resistant staph epidermidis identified in aerobic bottle (1/4). Patient does not have any history of SSTI or hospitalization for infection. No foreign devices in body. Did have a hernia repair two years ago.     On ROS patient denies any CP, SOB, N/V/D/C or abdominal pain. Reports having chills but no subjective fevers.       PAST MEDICAL & SURGICAL HISTORY:  HTN (hypertension)  Arthritis  GERD (gastroesophageal reflux disease)  Chronic CHF  CAD (coronary artery disease)  Constipation  Depression  Chronic renal insufficiency  Cellulitis, leg  HTN (hypertension)  HLD (hyperlipidemia)  GERD (gastroesophageal reflux disease)  Chronic systolic congestive heart failure  CAD (coronary artery disease)  History of BPH  Constipation  Depression  Anxiety  Chronic renal insufficiency  H/O hernia repair    ANTIBIOTICS:  MEDICATIONS  (STANDING):  aspirin  chewable 81 milliGRAM(s) Oral daily  atorvastatin 40 milliGRAM(s) Oral at bedtime  cloNIDine 0.1 milliGRAM(s) Oral two times a day  enoxaparin Injectable 30 milliGRAM(s) SubCutaneous every 24 hours  furosemide   Injectable 40 milliGRAM(s) IV Push every 12 hours  metoprolol succinate ER 50 milliGRAM(s) Oral daily  multivitamin 1 Tablet(s) Oral daily  pantoprazole    Tablet 40 milliGRAM(s) Oral before breakfast  vancomycin  IVPB 1000 milliGRAM(s) IV Intermittent every 24 hours    MEDICATIONS  (PRN):  acetaminophen     Tablet .. 650 milliGRAM(s) Oral every 6 hours PRN Temp greater or equal to 38C (100.4F), Mild Pain (1 - 3)  ondansetron Injectable 4 milliGRAM(s) IV Push every 8 hours PRN Nausea and/or Vomiting      Allergies  No Known Allergies  Intolerances        SOCIAL HISTORY:  Lives in the [place] with his wife, no pets. No ETOH or tobacco use. No drug allergies.     FAMILY HISTORY:  No pertinent family history in first degree relatives     no FH leading to current infection    Vital Signs Last 24 Hrs  T(C): 37 (29 Oct 2024 09:38), Max: 37 (29 Oct 2024 09:38)  T(F): 98.6 (29 Oct 2024 09:38), Max: 98.6 (29 Oct 2024 09:38)  HR: 70 (29 Oct 2024 09:38) (66 - 77)  BP: 133/76 (29 Oct 2024 09:38) (129/75 - 156/84)  BP(mean): --  RR: 18 (29 Oct 2024 09:38) (17 - 18)  SpO2: 98% (29 Oct 2024 09:38) (93% - 100%)    Parameters below as of 29 Oct 2024 09:38  Patient On (Oxygen Delivery Method): nasal cannula        10-28-24 @ 07:01  -  10-29-24 @ 07:00  --------------------------------------------------------  IN: 0 mL / OUT: 1125 mL / NET: -1125 mL    10-29-24 @ 07:01  -  10-29-24 @ 14:06  --------------------------------------------------------  IN: 0 mL / OUT: 350 mL / NET: -350 mL        PHYSICAL EXAM:  Constitutional: alert, NAD;   Eyes: periorbital edema present   Pulmonary: no respiratory distress and lungs were clear to auscultation bilaterally.   Heart: heart rate was normal and rhythm regular, normal S1 and S2; no murmur appreciated   Vascular:. trace edema b/l; R leg appears to have more erythema than L; clean lesion with raw skin without any drainage noted on right shin; Appears to have b/l LE dermatitis and RLE cellulitis   Abdomen: normal bowel sounds, soft, non-tender  Neurological: no focal deficits.   Psychiatric: the affect was normal      LABS:                        12.2   6.79  )-----------( 94       ( 29 Oct 2024 05:30 )             40.7     10-29    143  |  98  |  37[H]  ----------------------------<  94  4.5   |  38[H]  |  2.07[H]    Ca    8.5      29 Oct 2024 05:30  Phos  4.1     10-29  Mg     1.7     10-29        Urinalysis Basic - ( 29 Oct 2024 05:30 )    Color: x / Appearance: x / SG: x / pH: x  Gluc: 94 mg/dL / Ketone: x  / Bili: x / Urobili: x   Blood: x / Protein: x / Nitrite: x   Leuk Esterase: x / RBC: x / WBC x   Sq Epi: x / Non Sq Epi: x / Bacteria: x        MICROBIOLOGY:    RADIOLOGY & ADDITIONAL STUDIES:

## 2024-10-29 NOTE — PROGRESS NOTE ADULT - PROBLEM SELECTOR PLAN 11
On previous admission patient was noted to have PEDRITO. Patient reports that he does not use a CPAP machine at home but he is amendable to trying it.   - CPAP overnight if tolerated On previous admission patient was noted to have PEDRITO. Patient reports that he does not use a CPAP machine at home but he is amendable to trying it.   - c/w CPAP overnight

## 2024-10-29 NOTE — DISCHARGE NOTE PROVIDER - NSDCFUADDAPPT_GEN_ALL_CORE_FT
Appointment with PCP Dr. Stone Allen on 11/5/24 at 12:45PM  [facility] Kathya Harris, [facility] [place] [address]

## 2024-10-29 NOTE — DISCHARGE NOTE PROVIDER - NSDCMRMEDTOKEN_GEN_ALL_CORE_FT
cloNIDine 0.1 mg oral tablet: 1 tab(s) orally 2 times a day  doxepin 10 mg oral capsule: 10 milligram(s) orally once a day (at bedtime)  famotidine 20 mg oral tablet: 1 tab(s) orally  Fish Oil 1000 mg oral capsule: 1 cap(s) orally once a day  fosinopril 40 mg oral tablet: 1 tab(s) orally  gabapentin 600 mg oral tablet: 1 tab(s) orally once a day (at bedtime)  hydroCHLOROthiazide 12.5 mg oral tablet: 1 tab(s) orally once a day  Lipitor 40 mg oral tablet: 1 tab(s) orally once a day  metoprolol succinate 50 mg oral tablet, extended release: 50  orally 2 times a day  Multiple Vitamins oral tablet: 1 tab(s) orally once a day  omeprazole 20 mg oral delayed release capsule: 1 cap(s) orally once a day   aspirin 81 mg oral tablet, chewable: 1 tab(s) orally once a day  doxepin 10 mg oral capsule: 10 milligram(s) orally once a day (at bedtime)  famotidine 20 mg oral tablet: 1 tab(s) orally  Fish Oil 1000 mg oral capsule: 1 cap(s) orally once a day  gabapentin 600 mg oral tablet: 1 tab(s) orally once a day (at bedtime)  hydroCHLOROthiazide 12.5 mg oral tablet: 1 tab(s) orally once a day  Lipitor 40 mg oral tablet: 1 tab(s) orally once a day  metoprolol succinate 50 mg oral tablet, extended release: 50  orally 2 times a day  Multiple Vitamins oral tablet: 1 tab(s) orally once a day  omeprazole 20 mg oral delayed release capsule: 1 cap(s) orally once a day   aspirin 81 mg oral tablet, chewable: 1 tab(s) orally once a day  doxepin 10 mg oral capsule: 10 milligram(s) orally once a day (at bedtime)  famotidine 20 mg oral tablet: 1 tab(s) orally once a day  Fish Oil 1000 mg oral capsule: 1 cap(s) orally once a day  furosemide 40 mg oral tablet: 1 tab(s) orally every 24 hours  gabapentin 600 mg oral tablet: 1 tab(s) orally once a day (at bedtime)  hydrALAZINE 50 mg oral tablet: 1 tab(s) orally every 12 hours  Lipitor 40 mg oral tablet: 1 tab(s) orally once a day  Metoprolol Succinate ER 50 mg oral tablet, extended release: 1 tab(s) orally once a day  Multiple Vitamins oral tablet: 1 tab(s) orally once a day   aspirin 81 mg oral tablet, chewable: 1 tab(s) orally once a day  doxepin 10 mg oral capsule: 10 milligram(s) orally once a day (at bedtime)  famotidine 20 mg oral tablet: 1 tab(s) orally once a day  Fish Oil 1000 mg oral capsule: 1 cap(s) orally once a day  furosemide 40 mg oral tablet: 1 tab(s) orally every 24 hours  gabapentin 600 mg oral tablet: 1 tab(s) orally once a day (at bedtime)  hydrALAZINE 50 mg oral tablet: 1 tab(s) orally every 12 hours  linezolid 600 mg oral tablet: 1 tab(s) orally 2 times a day Starting 11/2/2024, do not take on 11/1/2024  Lipitor 40 mg oral tablet: 1 tab(s) orally once a day  Metoprolol Succinate ER 50 mg oral tablet, extended release: 1 tab(s) orally once a day  Multiple Vitamins oral tablet: 1 tab(s) orally once a day

## 2024-10-29 NOTE — OCCUPATIONAL THERAPY INITIAL EVALUATION ADULT - PERTINENT HX OF CURRENT PROBLEM, REHAB EVAL
84y year old Male with a PMHx significant for HTN, HLD, CAD (last PCI 18y ago), CHF (no echo on file), CKD (Cr 1.4 in 9/21), GERD, stroke (residual RUE weakness), anxiety/depression, brought to ED by girlfriend Tanya, for leg swelling, redness, and oozing. Patient reports that his leg swelling has been on-going for approximately 3 weeks. Has gotten worse in the last 3 days, to the point he felt he needed to come in. Was dropped off by his girlfriend, however, she was not at bedside to provide further collateral. Patient has an extensive history of smoking approximately 70 years.

## 2024-10-29 NOTE — PROGRESS NOTE ADULT - ATTENDING COMMENTS
84 M with HTN, HLD, CAD s/p remote PCI, CKD (most recent Cr 1.4 in 2021), stroke with residual RUE weakness, who was BIB family for bilateral lower extremity swelling found to be in severe sepsis with Staph bacteremia likely 2/2 RLE cellulitis, c/c/b ADHF.    Transitioned to vanc yesterday when GPC+, BCx on admission now growing 4/4 bottles of Staph haemolyticus , PCR identifies staph epi methicillin-resistant. No leukocytosis or fever, edema and erythema improved from yesterday. Also being diuresed with IV lasix 40 BID, notably with uptrending bicarb. KALA stagnant, Cr 2.1 with urine granular casts and proteinuria. UOP ~1.5L yesterday.    #staph epi bacteremia  #severe sepsis 2/2 non-purulent RLE cellulitis   - c/w vanc qD for renal function, trough tomorrow  - monitor for clinical improvement  - f/u BCx 10/27 - staph haemolyticus, methicillin-resistant Staph epi  - f/u BCx 10/28  - ID c/s    #KALA on CKD 2/2 ATN - Cr 2.1, baseline 1.68  - +granular casts, +proteinuria  - likely sepsis mediated ATN  - RBUS without hydro, has multiple large renal cysts    #CAD s/p remote PCI  #type II NSTEMI  #ADHF  #HTN  - ASA, statin  - daily weights, I/Os  - no echo on file, has not seen cardiologist in many years, no known recent ischemic work up  - f/u TTE  - diuresis - last 40 IV BID today  - c/w toprol 50  - hold ACE-I iso KALA  - continue clonidine to prevent rebound hypertension  - CXR without significant pulm vascular congestion - wean O2

## 2024-10-29 NOTE — PROGRESS NOTE ADULT - ATTENDING COMMENTS
Patient is an 83 yo Male with PMHx of CAD with remote PCI, Heart Failure, Stroke (residual RUE weakness), HTN, HLD, CKD (baseline Cr 1.6), GERD, Anxiety/Depression  brought to ED by family for b/l LE swelling, redness, and oozing-he admitted to [facility] for  non-purulent cellulitis with bacteremia found to be in Acute Heart Failure Exacerbation for which Cardiology was consulted    Review of Studies:  - TTE 10/29/24: EF 50%, mild symmetric LVH, mildly reduced LV systolic function, grade I LV diastolic dysfunction, normal RV size and systolic function, severely dilated LA, aortic sclerosis without significant stenosis, trace aortic regurgitation, PaSP 35mmHg, no pericardial effusion  - ECG10/28/24 : NSR w/ LAD, RBBB, and LVH by aVL criteria    Outpatient Providers: PCP Dr. Stone Allen    Home Medications: Metoprolol succinate ER 50mg BID, HCTZ 12.5mg QD, Clonidine 0.1mg BID, Atorvastatin 40mg HS, Fosinopril 40mg QD    # Acute on Chronic Diastolic Heart Failure Exacerbation   # ASCVD: CAD; CVA  - Patient is a poor historian. History if mostly taken from his wife and kids at bedside  - Both report that patient carries a diagnosis of '' a weak heart'' managed by his PCP. They recall Remote stress test and revascularization but are unable to detail patient's cardiac history further  - In the months leading up to hospitalization patient has reported progressive worsening Dyspnea and orthopnea making ADLS difficult. In recent weeks patient has been sleeping with numerous pillows in a seated position due to pronounced orthopnea. In the past week he has had worsening lower extremity edema prompting him to come to the hospital  - Echo performed today revealed Grade II Diastolic Dysfunction with a severely dilated LA as well as borderline LV function without significant valvular heart disease  - Clinically patient is warm, well perfused and compensated with JVP around 7 cm, clear lungs and improving pitting lower extremity edema. Patient feels improved from  a respiratory standpoint. He feels that his legs are lighter on assessment today  - At this time, patient is in acute heart failure exacerbation that is clinically improving   - He was diuresed yesterday and Is net Neg 1.1 L in the last 24 hours  - Would diurese with 40 IV BID again today. Anticipate one dose of 40 IV lasix on 10/30 am after which we will likely transition to PO diuretic regimen  - Cont with Toprol 50 mg po Daily with strict holding parameters  - Should SBP remain > 140/90 persistently, would initiate on Hydralazine 25 mg po TID with strict holding parameters  - Please obtain daily weight. Weight documented at 79.8 Kg on 10/27  - From a CAD standpoint please continue with ASA 81mg  and high intensity statin atorvastatin 40mg QD  - Please obtain collateral cardiac hx from PCP (LHC/RHC, TTE, stress tests)  - Please add on an a1c and lipid panel to am labs  - Cardiology will cont to follow with you, please call with any questions .

## 2024-10-29 NOTE — DISCHARGE NOTE PROVIDER - HOSPITAL COURSE
#Discharge:   84y year old Male with a PMHx significant for CHF, HTN, HLD, CAD, CKD, GERD, anxiety/depression, brought to ED by family, for 3 week history of leg swelling, redness and blistering that has worsened in the last 3 days and found to have cellulitis, ED course c/b respiratory distress after IV fluids thought to be HF exacerbation improving after IV lasix. Now monitoring AHRF, HF exacerbation and non-purulent cellulitis. Cardiology was consulted for a new heart exacerbation. TTE showed EF 50%. Got lasix 40mg IV for 2 days with improvement of swelling. Nephrology was consulted for proteinuria and KALA on CKD.     Problem List/Main Diagnoses:     New medications/therapies:   New lines/hardware:  Labs to be followed outpatient:   Exam to be followed outpatient:     Discharge plan: discharge to ______    Physical Exam Upon Discharge:     #Discharge:   84y year old Male with a PMHx significant for CHF, HTN, HLD, CAD, CKD, GERD, anxiety/depression, brought to ED by family, for 3 week history of leg swelling, redness and blistering that has worsened in the last 3 days and found to have cellulitis, ED course c/b respiratory distress after IV fluids thought to be HF exacerbation improving after IV lasix. Now monitoring AHRF, HF exacerbation and non-purulent cellulitis. Cardiology was consulted for a new heart exacerbation. TTE showed EF 50%. Got lasix 40mg IV for 2 days with improvement of swelling. Nephrology was consulted for proteinuria and KALA on CKD.     Problem List/Main Diagnoses:     New medications/therapies:   New lines/hardware:  Labs to be followed outpatient:   Exam to be followed outpatient:     Discharge plan: discharge to Home    Physical Exam Upon Discharge:

## 2024-10-29 NOTE — PROGRESS NOTE ADULT - ASSESSMENT
84M with PMHx of HTN, HLD, CAD (last PCI 18y ago), ?CHF, CKD (baseline Cr 1.6), GERD, stroke (residual RUE weakness), anxiety/depression who was brought to ED by family for b/l LE swelling, redness, and oozing-he was admitted to [facility] for bilateral lower extremity swelling and non-purulent cellulitis. He was found to be in clinical acute decompensated HF pending TTE for which cardiology was consulted for.     Review of Studies:  10/28/24 ECG: NSR w/ LAD, RBBB, and LVH by aVL criteria    Outpatient Providers: PCP Dr. Stone Allen    Home Medications: Metoprolol succinate ER 50mg BID, HCTZ 12.5mg QD, Clonidine 0.1mg BID, Atorvastatin 40mg HS, Fosinopril 40mg QD    Recommendations:    #ADHF  Etiology: ?ICM  Rhythm: SR  Hemodynamics: normotensive   Perfusion: WWP making urine  GDMT:  C/w Toprol 50mg XL BID holding for HR<60 and/or SBP <110 (please obtain med rec to clarify frequency)  Will hold on vasodilators given KALA  Pending phenotyping of HF via TTE for further GDMT recs  Diuretics: Overloaded on exam, please begin lasix 40mg IVP VID,  strict I/Os and daily weights, please  Device: n/a  AT: n/a   **Please obtain TTE w/Spectral dopplers    ?CAD  -C/w ASA 81mg QD  -C/w atorvastatin 40mg QD  -Please obtain collateral cardiac hx from PCP (LHC/RHC, TTE, stress tests)  -Please add on an a1c and lipid panel    #HTN  -Holding ACEI in the setting of KALA  -If persistently hypertensive (SBP above 140s), can start hydralazine 25mg TID holding for SBP <110    Recommendations above are preliminary pending discussion with an attending cardiologist  Plan was discussed with primary team  We'll continue to follow, thank you for the consultation      Tino Castro PGY6 CVD Fellow  Cardiology Consult Pager: [phone]  CCU Pager: [phone]  Heart Failure Pager: [phone]       84M with PMHx of HTN, HLD, CAD (last PCI 18y ago), ?CHF, CKD (baseline Cr 1.6), GERD, stroke (residual RUE weakness), anxiety/depression who was brought to ED by family for b/l LE swelling, redness, and oozing-he was admitted to [facility] for bilateral lower extremity swelling and non-purulent cellulitis. He was found to be in clinical acute decompensated HF pending TTE for which cardiology was consulted for. Patient tolerated Lasix 40mg IV BID well overnight, output 1.125 L by urine, has decreased LE swelling, but remains volume overloaded on exam.    Review of Studies:  10/28/24 ECG: NSR w/ LAD, RBBB, and LVH by aVL criteria    Outpatient Providers: PCP Dr. Stone Allen    Home Medications: Metoprolol succinate ER 50mg BID, HCTZ 12.5mg QD, Clonidine 0.1mg BID, Atorvastatin 40mg HS, Fosinopril 40mg QD    Recommendations:    #ADHF  Etiology: ?ICM  Rhythm: SR  Hemodynamics: normotensive   Perfusion: WWP making urine  GDMT: Please c/w Toprol 50mg XL QD holding for HR<60 and/or SBP <110  C/w hold on vasodilators given KALA  Pending phenotyping of HF via TTE w/ Spectral dopplers for further GDMT recs  Diuretics: Decreased LE swelling, but remains overloaded on exam. Please c/w lasix 40mg IVP BID, strict I/Os, daily standing weights, and 1.2L fluid restriction.  Device: n/a  AT: n/a    ?CAD  -C/w ASA 81mg QD  -C/w atorvastatin 40mg QD  -Please obtain collateral cardiac hx from PCP (LHC/RHC, TTE, stress tests)    #HTN  -c/w hold on ACEI in the setting of KALA  -If persistently hypertensive (SBP above 140s), can start hydralazine 25mg TID holding for SBP <110    Recommendations above are preliminary pending discussion with an attending cardiologist  Plan was discussed with primary team  We'll continue to follow, thank you for the consultation      Tino Castro PGY6 CVD Fellow  Cardiology Consult Pager: [phone]  CCU Pager: [phone]  Heart Failure Pager: [phone]       84M with PMHx of HTN, HLD, CAD (last PCI 18y ago), HFpEF, CKD (baseline Cr 1.6), GERD, stroke (residual RUE weakness), anxiety/depression who was brought to ED by family for b/l LE swelling, redness, and oozing-he was admitted to [facility] for bilateral lower extremity swelling and non-purulent cellulitis. He was found to be in clinical acute decompensated HF pending TTE for which cardiology was consulted for. Since beginning higher dosage IV diuresis yesterday, he has responded appropriately with correlating improvement in volume status on physical exam.     Review of Studies:  10/28/24 ECG: NSR w/ LAD, RBBB, and LVH by aVL criteria    Outpatient Providers: PCP Dr. Stone Allen    Home Medications: Metoprolol succinate ER 50mg BID, HCTZ 12.5mg QD, Clonidine 0.1mg BID, Atorvastatin 40mg HS, Fosinopril 40mg QD    Recommendations:    #ADHF  #HFpEF  Etiology: ?IHD  Rhythm: SR  Hemodynamics: normotensive   Perfusion: WWP making adequate urine  GDMT:   Please c/w Toprol 50mg XL QD holding for HR<60 and/or SBP <110  Continue to hold vasodilators given KALA  Diuretics: Decreased LE swelling, but remains overloaded on exam, please c/w lasix 40mg IVP BID for today and AM dosing, will reassess in AM further IV diuresis, strict I/Os, daily standing weights, and 1.2L fluid restriction  Net negative goal 2L  Device: n/a  AT: n/a    #?Chronic Coronary Disease  -C/w ASA 81mg QD  -C/w atorvastatin 40mg QD  -Please obtain collateral cardiac hx from PCP (LHC/RHC, TTE, stress tests)    #HTN  -c/w hold ACEI in the setting of KALA  -If persistently hypertensive (SBP above 140s), can start hydralazine 25mg TID holding for SBP <110    #HLD  -LDL 30 (at goal)  -C/w Atorvastatin 40mg QD    Recommendations above are preliminary pending discussion with an attending cardiologist  Plan was discussed with primary team  We'll continue to follow, thank you for the consultation      Tino Castro PGY6 CVD Fellow  Cardiology Consult Pager: [phone]  CCU Pager: [phone]  Heart Failure Pager: [phone]       84M with PMHx of HTN, HLD, CAD (last PCI 18y ago), HFpEF, CKD (baseline Cr 1.6), GERD, stroke (residual RUE weakness), anxiety/depression who was brought to ED by family for b/l LE swelling, redness, and oozing-he was admitted to [facility] for bilateral lower extremity swelling and non-purulent cellulitis. He was found to be in clinical acute decompensated HF pending TTE for which cardiology was consulted for. Since beginning higher dosage IV diuresis yesterday, he has responded appropriately with correlating improvement in volume status on physical exam.     Review of Studies:  10/29/24 Echo: EF 50%, mild symmetric LVH, mildly reduced LV systolic function, grade I LV diastolic dysfunction, normal RV size and systolic function, severely dilated LA, aortic sclerosis without significant stenosis, trace aortic regurgitation, PaSP 35mmHg, no pericardial effusion  10/28/24 ECG: NSR w/ LAD, RBBB, and LVH by aVL criteria    Outpatient Providers: PCP Dr. Stone Allen    Home Medications: Metoprolol succinate ER 50mg BID, HCTZ 12.5mg QD, Clonidine 0.1mg BID, Atorvastatin 40mg HS, Fosinopril 40mg QD    Recommendations:    #ADHF  #HFpEF  Etiology: ?IHD  Rhythm: SR  Hemodynamics: normotensive   Perfusion: WWP making adequate urine  GDMT:   Please c/w Toprol 50mg XL QD holding for HR<60 and/or SBP <110  Continue to hold vasodilators given KALA  Diuretics: Decreased LE swelling, but remains overloaded on exam, please c/w lasix 40mg IVP BID for today and AM dosing, will reassess in AM further IV diuresis, strict I/Os, daily standing weights, and 1.2L fluid restriction  Net negative goal 2L  Device: n/a  AT: n/a    #?Chronic Coronary Disease  -C/w ASA 81mg QD  -C/w atorvastatin 40mg QD  -Please obtain collateral cardiac hx from PCP (LHC/RHC, TTE, stress tests)    #HTN  -c/w hold ACEI in the setting of KALA  -If persistently hypertensive (SBP above 140s), can start hydralazine 25mg TID holding for SBP <110    #HLD  -LDL 30 (at goal)  -C/w Atorvastatin 40mg QD    Recommendations above are preliminary pending discussion with an attending cardiologist  Plan was discussed with primary team  We'll continue to follow, thank you for the consultation      Tino Castro PGY6 CVD Fellow  Cardiology Consult Pager: [phone]  CCU Pager: [phone]  Heart Failure Pager: [phone]

## 2024-10-29 NOTE — DISCHARGE NOTE PROVIDER - NSDCCPCAREPLAN_GEN_ALL_CORE_FT
PRINCIPAL DISCHARGE DIAGNOSIS  Diagnosis: Cellulitis  Assessment and Plan of Treatment: Cellulitis is a bacterial infection that affects the skin's deeper layers, including the dermis and subcutaneous fat  Cellulitis is usually treated at home with antibiotics, but sometimes requires hospitalization. Symptoms should improve within 7–10 days of starting antibiotics, but it's important to take all the prescribed medication, even if symptoms improve early.  For this, you should be taking your Linezolid 600mg twice a day starting on the morning of November 2nd, 2024. You received antibiotics in the hospital today on the 1st so there is no need to take any today. This will continue for three days to conclude on November 4th in the evening for a total of 6 pills over 3 days.      SECONDARY DISCHARGE DIAGNOSES  Diagnosis: Congestive heart failure (CHF)  Assessment and Plan of Treatment: Congestive heart failure is a long-term condition that happens when your heart can't pump blood well enough to give your body a normal supply. Blood and fluids collect in your lungs and legs over time.  Because you were in the hospital with this diagnosis, it is important that you continue to take all of your medications as prescribed. This include your diuretics as well your new medication for your high blood pressure called hydralazine. You should take this twice a day outside of the hospital.    Diagnosis: Hypoxemia  Assessment and Plan of Treatment: While inside the hospital, you were found to have low blood oxygen levels with activity. This may be in relation to your heart failure, however given that you did not fully recover from this aspect which may take some more time, you are being sent home with oxygen to help keep your levels high until you improve. You required 2L of home oxygen to maintain your levels and you should continue to use this especially when youre moving around and physically active. Your cardiologist or other healthcare providers can help wean you off of this prescription for oxygen.    Diagnosis: Chronic kidney disease (CKD)  Assessment and Plan of Treatment:

## 2024-10-29 NOTE — PROGRESS NOTE ADULT - PROBLEM SELECTOR PLAN 3
Likely acute on chronic CHF in setting of IV fluid administration. On exam, chest clear to auscultation w/ b/l lower extremity edema to mid-shins, erythema and tenderness. No previous echo available. Labs remarkable for BNP of 8.6k, Trop still elevated, normal CKMB. CXR revealed clear lung fields. EKG w/ known RBBB/LAFB. New T-wave inversions noted on EKG today on V4/V5 compared to 2021. Pt denies CP. trops mildly elevated iso KALA, CK/CKMB wnl. Per OP cardiologist, Dr. Kwesi Denny ([phone]), lost to f/u in 2015 after cardiac cath performed, no record of heart failure; no previous echos. Appears to be first episode decompensated heart failure episode.   - Cardiology consulted, appreciate recs       - f/u TTE       - lasix 40mg IV BID            - s/p lasix 20mg IVP with improvement of resp status on 10/27            - goal net negative 1-1.5L in 24 hrs       - [facility] metoprolol 50mg to QD (from home BID dose, given active diuresis)        - Strict I/Os       - Daily weights       - c/w ASA 81mg & statin 40mg QD       - f/u A1c & lipid panel  - Wean O2 as tolerated above    #Demand Ischemia  Patient with elevated troponin on admission, downtrending now. No chest pain, SOB. No signs of any ST elevations or depressions on EKG. Elevated troponin likely from demand ischemia in the setting of heart failure exacerbation. Likely acute on chronic CHF in setting of IV fluid administration. On exam, chest clear to auscultation w/ b/l lower extremity edema to mid-shins, erythema and tenderness. No previous echo available. Labs remarkable for BNP of 8.6k, Trop still elevated, normal CKMB. CXR revealed clear lung fields. EKG w/ known RBBB/LAFB. New T-wave inversions noted on EKG today on V4/V5 compared to 2021. Pt denies CP. trops mildly elevated iso KALA, CK/CKMB wnl. Per OP cardiologist, Dr. Kwesi Denny ([phone]), lost to f/u in 2015 after cardiac cath performed, no record of heart failure; no previous echos. Appears to be first episode decompensated heart failure episode.   - Cardiology consulted, appreciate recs       - f/u TTE       - c/w lasix 40mg IV BID (D1 10/28)            - s/p lasix 20mg IVP with improvement of resp status on 10/27            - goal net negative 1-1.5L in 24 hrs       - c/w metoprolol 50mg to QD (from home BID dose, given active diuresis)        - Strict I/Os       - Daily weights       - c/w ASA 81mg & statin 40mg QD  - Wean O2 as tolerated above  - Continue to monitor bicarb for diuretic induced contraction alkalosis    #Demand Ischemia  Patient with elevated troponin on admission, downtrending now. No chest pain, SOB. No signs of any ST elevations or depressions on EKG. Elevated troponin likely from demand ischemia in the setting of heart failure exacerbation. #HFpEF  Likely acute on chronic CHF in setting of IV fluid administration. On exam, chest clear to auscultation w/ b/l lower extremity edema to mid-shins, erythema and tenderness. No previous echo available. Labs remarkable for BNP of 8.6k, Trop still elevated, normal CKMB. CXR revealed clear lung fields. EKG w/ known RBBB/LAFB. New T-wave inversions noted on EKG today on V4/V5 compared to 2021. Pt denies CP. trops mildly elevated iso KALA, CK/CKMB wnl. TTE showing EF 50%. Appears to be first episode decompensated heart failure episode.   - Outpatient cardiology collateral:        - Dr. Kwesi Denny ([phone]), lost to f/u in 2015 after cardiac cath performed, no record of heart failure.        - Last cardiac cath 7/6/15 showed non-obstructive CAD, increased right-sided pressures, mild pulmonary HTN,          mildly increased PVRI, and decreased cardiac output. Proximal RCA showed mild diffuse disease.         - no previous echos.  - Cardiology consulted, appreciate recs       - c/w lasix 40mg IV BID (D1 10/28)            - s/p lasix 20mg IVP with improvement of resp status on 10/27            - goal net negative 1-1.5L in 24 hrs       - c/w metoprolol 50mg to QD (from home BID dose, given active diuresis)        - Strict I/Os       - Daily weights       - c/w ASA 81mg & statin 40mg QD  - Wean O2 as tolerated above  - Continue to monitor bicarb for diuretic induced contraction alkalosis    #Demand Ischemia  Patient with elevated troponin on admission, downtrending now. No chest pain, SOB. No signs of any ST elevations or depressions on EKG. Elevated troponin likely from demand ischemia in the setting of heart failure exacerbation.

## 2024-10-29 NOTE — DISCHARGE NOTE PROVIDER - DISCHARGING ATTENDING PHYSICIAN:
Patient has given consent to record this visit for documentation in their clinical record.           Delaney

## 2024-10-29 NOTE — PROGRESS NOTE ADULT - PROBLEM SELECTOR PLAN 4
#CKD progression vs KALA on CKD  Pt w/ hx of chronic renal insufficiency (most recent creatinine 1.68 2024). Cr 2.12 on this admission. Creatinine clearance 28 on admission. . FeUrea 33.7% suggestive of pre-renal etiology. KALA on CKD likely pre-renal in the setting of likely CHF exacerbation (cardiorenal syndrome).   - Caution w/ fluids given likely flash pulmonary edema s/p 500cc  - nephrology consulted, appreciate recs       - f/u renal US       - fluid restriction 1.2 L/D  - renally dose medication:       - lovenox 30mg for DVT ppx       - holding home gabapentin 600mg until renal improvement #ATN on CKD likely due to infection.   Pt w/ hx of chronic renal insufficiency (most recent creatinine 1.68 2024). Cr 2.12 on this admission (peaked on admission). Creatinine clearance 28 on admission. . FeUrea 33.7% suggestive of pre-renal etiology. Renal ultrasound showing no hydronephrosis or obstruction. Likely ATN on CKD due to infection.   - Caution w/ fluids given likely flash pulmonary edema s/p 500cc  - nephrology consulted, appreciate recs       - fluid restriction 1.2 L/D  - renally dose medication:       - lovenox 30mg for DVT ppx       - holding home gabapentin 600mg until renal improvement

## 2024-10-29 NOTE — PROGRESS NOTE ADULT - ASSESSMENT
66 history of  CKD stage III baseline creatinine of 1.4, hypertension, CHF,  hyperlipidemia ,CAD, old stroke admitted for CHF exacerbation with bilateral lower limbs cellulitis, nephrology consulted for KALA on CKD Creat 2.1        Assessment  Non oliguric KALA on CKD likely Cardiorenal syndrome(pre-renal)    - Strict I/O chart  -Daily I/O chart  -Daily weight  -For Renal sono  -trend BMP daily  -fluid restriction 1.2 l/d  -For ECHO  -Cardiology review    Sub-nephrotic range proteinuria likely secondary to KALA ? tubular injury: nephrotic syndrome unlikely  UPCR <3.5  -Pt will benefit from ACE I or ARB after the acute phase  -treatment of underlying course    Pt needs cardiology consult  will follow   66 history of  CKD stage III baseline creatinine of 1.4, hypertension, CHF,  hyperlipidemia ,CAD, old stroke admitted for CHF exacerbation with bilateral lower limbs cellulitis, nephrology consulted for KALA on CKD Creat 2.1      Assessment  Patient has improvement in Cr to 2.07, imaging showing no obstruction suggesting likely ATN due to infection, possibly glomerulonephritis however less likely given no RBC casts. Ctm for improvement in KALA      - Strict I/O chart  - Daily I/O chart  - Daily weight  -trend BMP daily  -fluid restriction 1.2 l/d  - Optimize SBP above 110  - Avoid IV contrast  -Cardiology review    Sub-nephrotic range proteinuria likely secondary to KALA ? tubular injury: nephrotic syndrome unlikely  UPCR <3.5  -Pt will benefit from ACE I or ARB after the acute phase  -treatment of underlying course    Pt needs cardiology consult  will follow

## 2024-10-29 NOTE — PHYSICAL THERAPY INITIAL EVALUATION ADULT - GAIT DEVIATIONS NOTED, PT EVAL
decreased jian/increased time in double stance/decreased step length/decreased weight-shifting ability

## 2024-10-29 NOTE — DISCHARGE NOTE PROVIDER - ATTENDING DISCHARGE PHYSICAL EXAMINATION:
******************************************************  ATTENDING ATTESTATION    I have read and agree with the resident Discharge Note above. Patient seen and discussed with resident team on the day of discharge.     Briefly, 84 M with HTN, HLD, CAD s/p remote PCI, CKD (most recent Cr 1.4 in 2021), stroke with residual RUE weakness, who was BIB family for bilateral lower extremity swelling found to be in severe sepsis with Staph bacteremia likely 2/2 RLE cellulitis, c/c/b AHRF 2/2 new HF Dx. Found to be bacteremic with Staph haemolyticus, methicillin-resistant - treated with IV dapto and discharged on PO linezolid to complete 7 day course as per ID guidance. Concurrently patient was also found to be in volume overload with new hypoxia following IVF administration in ED, TTE done with severely dilated LA and borderline LV function. Cardiology was consulted and he was diuresed adequately with IV lasix, which was transitioned to maintenance oral lasix. Patient still required minimal O2 particularly with exertion, expect further improvement with continued rehabilitation. KALA on CKD thought to be 2/2 ATN iso bacteremia, expect slow improvement. HTN regimen was adjusted as per Cardiology, started on hydral 50 BID and can titrate to TID if needed. Discontinued clonidine, ACEI, HCTZ iso KALA on CKD. Will need to follow up with outpatient Cardiology for further management. Recommended for rehab and discussed with patient and partner at length regarding benefits of rehab in his case, however they opted for discharge home. Return precautions, new medications, and importance of follow up were discussed.    Physical Exam:  T(C): 37.1  HR: 92  BP: 135/69  RR: 17  SpO2: 94%    Gen: sitting upright in chair at time of exam, pleasant, INAD, difficult speech due to lack of dentures   Neck: supple, trachea at midline  CV: RRR, +S1/S2  Pulm: adequate respiratory effort, no increased work of breathing  Abd: soft, NTND  Skin: warm and dry, no new rashes vs prior report  Ext: bilateral LE with chronic venous stasis changes,  pitting edema to ankles improved from prior, RLE lateral open wound with improved erythema  Neuro: AOx3, no gross focal neurological deficits  Psych: affect and behavior appropriate    I was physically present for the evaluation and management services provided. I agree with the included history, physical, and plan which I reviewed and edited where appropriate. I spent 33 minutes on direct patient care and discharge planning with more than 50% of the visit spent on counseling and/or coordination of care.

## 2024-10-29 NOTE — PHYSICAL THERAPY INITIAL EVALUATION ADULT - GENERAL OBSERVATIONS, REHAB EVAL
PT IE Completed. Pt received semi-supine in bed, NAD, +NC 2L, +hep-lock, +CB, +alarm. Cleared for PT by RN Holger. MARIA DEL CARMEN Silva present.

## 2024-10-29 NOTE — PROGRESS NOTE ADULT - SUBJECTIVE AND OBJECTIVE BOX
SUBJECTIVE:  CRISTOBAL PORRAS is doing well this morning. Today is hospital day 2d.     24 Hour Events:   - No acute overnight events.    ALLERGIES:  No Known Allergies    MEDICATIONS:  STANDING MEDICATIONS  aspirin  chewable 81 milliGRAM(s) Oral daily  atorvastatin 40 milliGRAM(s) Oral at bedtime  cloNIDine 0.1 milliGRAM(s) Oral two times a day  enoxaparin Injectable 30 milliGRAM(s) SubCutaneous every 24 hours  furosemide   Injectable 40 milliGRAM(s) IV Push every 12 hours  magnesium sulfate  IVPB 2 Gram(s) IV Intermittent once  metoprolol succinate ER 50 milliGRAM(s) Oral daily  multivitamin 1 Tablet(s) Oral daily  pantoprazole    Tablet 40 milliGRAM(s) Oral before breakfast  vancomycin  IVPB 1000 milliGRAM(s) IV Intermittent every 24 hours    PRN MEDICATIONS  acetaminophen     Tablet .. 650 milliGRAM(s) Oral every 6 hours PRN  ondansetron Injectable 4 milliGRAM(s) IV Push every 8 hours PRN    VITALS:   ICU Vital Signs Last 24 Hrs  T(C): 37 (29 Oct 2024 09:38), Max: 37 (29 Oct 2024 09:38)  T(F): 98.6 (29 Oct 2024 09:38), Max: 98.6 (29 Oct 2024 09:38)  HR: 70 (29 Oct 2024 09:38) (66 - 77)  BP: 133/76 (29 Oct 2024 09:38) (129/75 - 156/84)  BP(mean): --  ABP: --  ABP(mean): --  RR: 18 (29 Oct 2024 09:38) (17 - 18)  SpO2: 98% (29 Oct 2024 09:38) (93% - 100%)    O2 Parameters below as of 29 Oct 2024 09:38  Patient On (Oxygen Delivery Method): nasal cannula            PHYSICAL EXAM  Constitutional: comfortably in bed; NAD  Head: NC/AT  Eyes: PERRL, EOMI, anicteric sclera  Neck: supple; no JVD  Respiratory: CTA B/L; no W/R/R, no retractions  Cardiac: +S1/S2; RRR; no M/R/G; PMI non-displaced  Gastrointestinal: abdomen soft, NT/ND; no rebound or guarding; +BSx4  Extremities: WWP, no clubbing or cyanosis; b/l pitting edema lower limbs  Neurologic: awakae, answers questions appropriately    LABS:                        12.2   6.79  )-----------( 94       ( 29 Oct 2024 05:30 )             40.7     10-29    143  |  98  |  37[H]  ----------------------------<  94  4.5   |  38[H]  |  2.07[H]    Ca    8.5      29 Oct 2024 05:30  Phos  4.1     10-29  Mg     1.7     10-29    TPro  7.3  /  Alb  3.3  /  TBili  1.0  /  DBili  0.2  /  AST  34  /  ALT  17  /  AlkPhos  74  10-27      Urinalysis Basic - ( 29 Oct 2024 05:30 )    Color: x / Appearance: x / SG: x / pH: x  Gluc: 94 mg/dL / Ketone: x  / Bili: x / Urobili: x   Blood: x / Protein: x / Nitrite: x   Leuk Esterase: x / RBC: x / WBC x   Sq Epi: x / Non Sq Epi: x / Bacteria: x          MICRO:    Urinalysis with Rflx Culture (collected 27 Oct 2024 17:00)    Culture - Blood (collected 27 Oct 2024 12:25)  Source: .Blood BLOOD  Gram Stain (28 Oct 2024 13:32):    Growth in aerobic bottle: Gram Positive Cocci in Clusters  Preliminary Report (29 Oct 2024 11:29):    Growth in aerobic bottle: Staphylococcus haemolyticus    Isolation of Coagulase negative Staphylococcus from single blood culture    sets may represent    contamination. Contact the Microbiology Department at [phone] if    susceptibility testing is needed.    clinically indicated.    Direct identification is available within approximately 3-5    hours either by Blood Panel Multiplexed PCR or Direct    MALDI-TOF. Details: [email or web address]  Organism: Blood Culture PCR (28 Oct 2024 14:51)  Organism: Blood Culture PCR (28 Oct 2024 14:51)    Culture - Blood (collected 27 Oct 2024 12:15)  Source: .Blood BLOOD  Gram Stain (28 Oct 2024 22:57):    Growth in aerobic bottle: Gram Positive Cocci in Clusters    Growth in anaerobic bottle: Gram Positive Cocci in Clusters  Preliminary Report (28 Oct 2024 22:57):    Growth in aerobic bottle: Gram Positive Cocci in Clusters    Growth in anaerobic bottle: Gram Positive Cocci in Clusters      CARDS:  CARDIAC MARKERS ( 27 Oct 2024 12:33 )  x     / x     / x     / x     / 3.5 ng/mL        RADIOLOGY: Reviewed      Lines:  Central line:              Date placed:             Indication:   Intravenous Access:   NG tube:   Alexander Catheter:       Vancomycin Level, Trough: 16:00 (10-28-24 @ 15:26)         Nephrology progress note    Patient seen and examined at bedside, resting comfortably in no apparent distress.    Allergies:  No Known Allergies    Hospital Medications:   MEDICATIONS  (STANDING):  aspirin  chewable 81 milliGRAM(s) Oral daily  atorvastatin 40 milliGRAM(s) Oral at bedtime  cloNIDine 0.1 milliGRAM(s) Oral two times a day  enoxaparin Injectable 30 milliGRAM(s) SubCutaneous every 24 hours  furosemide   Injectable 40 milliGRAM(s) IV Push every 12 hours  metoprolol succinate ER 50 milliGRAM(s) Oral daily  multivitamin 1 Tablet(s) Oral daily  pantoprazole    Tablet 40 milliGRAM(s) Oral before breakfast  vancomycin  IVPB 1000 milliGRAM(s) IV Intermittent every 24 hours    REVIEW OF SYSTEMS:  All other review of systems is negative unless indicated above.    VITALS:  T(F): 98.6 (10-29-24 @ 09:38), Max: 98.6 (10-29-24 @ 09:38)  HR: 70 (10-29-24 @ 09:38)  BP: 133/76 (10-29-24 @ 09:38)  RR: 18 (10-29-24 @ 09:38)  SpO2: 98% (10-29-24 @ 09:38)  Wt(kg): --    10-27 @ 07:01  -  10-28 @ 07:00  --------------------------------------------------------  IN: 0 mL / OUT: 400 mL / NET: -400 mL    10-28 @ 07:01  -  10-29 @ 07:00  --------------------------------------------------------  IN: 0 mL / OUT: 1125 mL / NET: -1125 mL    10-29 @ 07:01  -  10-29 @ 13:36  --------------------------------------------------------  IN: 0 mL / OUT: 350 mL / NET: -350 mL        PHYSICAL EXAM:  Constitutional: comfortably in bed; NAD  Head: NC/AT  Neck: supple; no JVD  Respiratory: CTA B/L; no W/R/R, no retractions  Cardiac: +S1/S2; RRR; no M/R/G; PMI non-displaced  Gastrointestinal: abdomen soft, NT/ND; no rebound or guarding; +BSx4  Extremities: WWP, no clubbing or cyanosis; b/l pitting LE edema with hyperpigmentation of shins  Neurologic: awake, answers questions appropriately  Vascular Access:    LABS:  10-29    143  |  98  |  37[H]  ----------------------------<  94  4.5   |  38[H]  |  2.07[H]    Ca    8.5      29 Oct 2024 05:30  Phos  4.1     10-29  Mg     1.7     10-29                            12.2   6.79  )-----------( 94       ( 29 Oct 2024 05:30 )             40.7       Urine Studies:  Urinalysis Basic - ( 29 Oct 2024 05:30 )    Color:  / Appearance:  / SG:  / pH:   Gluc: 94 mg/dL / Ketone:   / Bili:  / Urobili:    Blood:  / Protein:  / Nitrite:    Leuk Esterase:  / RBC:  / WBC    Sq Epi:  / Non Sq Epi:  / Bacteria:       Creatinine, Random Urine: 34 mg/dL (10-29 @ 09:53)  Protein/Creatinine Ratio Calculation: 1.2 Ratio (10-29 @ 09:53)  Sodium, Random Urine: 35 mmol/L (10-28 @ 11:26)  Creatinine, Random Urine: 112 mg/dL (10-28 @ 11:26)  Protein/Creatinine Ratio Calculation: 1.6 Ratio (10-28 @ 11:26)  Osmolality, Random Urine: 478 mosm/kg (10-28 @ 11:26)  Potassium, Random Urine: 56 mmol/L (10-28 @ 11:26)  Sodium, Random Urine: 63 mmol/L (10-27 @ 17:00)  Osmolality, Random Urine: 431 mosm/kg (10-27 @ 17:00)  Potassium, Random Urine: 63 mmol/L (10-27 @ 17:00)    RADIOLOGY & ADDITIONAL STUDIES:   PROCEDURE DATE: 10/29/2024        INTERPRETATION: CLINICAL INFORMATION: Acute kidney injury. Chronic kidney disease.    COMPARISON: None available.    TECHNIQUE: Sonography of the kidneys and bladder.    FINDINGS:  Right kidney: 17.5 cm. Increased renal parenchymal echogenicity. Several renal cysts, some with thin internal septations. The largest cyst in the right measures 8.6 cm in the midportion. No solid renal mass, hydronephrosis or calculi.    Left kidney: 17.4 cm. Increased renal parenchymal echogenicity. Several cysts, some with thin internal septations. The largest cyst in the left kidney measures 5.9 cm at the upper pole. No solid renal mass, hydronephrosis or calculi.    IMPRESSION:    Multiple large bilateral renal cysts.  Increased renal parenchymal echogenicity suggestive of medical renal disease.    No hydronephrosis.    --- End of Report ---

## 2024-10-29 NOTE — PHYSICAL THERAPY INITIAL EVALUATION ADULT - WEIGHT-BEARING RESTRICTIONS: TOILET, REHAB EVAL
[facility] Medicine Services  INPATIENT PROGRESS NOTE    Length of Stay: 0  Date of Admission: 11/5/2018  Primary Care Physician: Terrell Arzate MD    Subjective   Chief Complaint: No complaints    HPI:       11/11/2018:  The patient had confusion during the night per nursing staff.      11/10/2018:  The patient was able to assist with feeding today.  The patient home situation is being evaluated by APS and they must clear him for discharge.  Awaiting recommendation.     11/9/2018:  The patient continues to decline and requires feeding by nursing staff currently.  Speech and swallow evaluation recommends all medication be presented in [facility] or [facility] [employer]    11/8/2018:  Due to worsening fatigue and drop in hemoglobin (7.2), two units of PRBC given today.  I spoke with the patient's daughter and received consent for blood.       86 year old  male with PMH of tricuspid regurgitation, peripheral vascular disease, HLD, pulmonary hypertension, HTN, CKD stage III and HFpEF that presented to [facility] on 11/5/2018 secondary to altered mental status.  The patient was found to have a Klebsiella UTI and started on Rocephin.  He has a chronic suprapubic catheter that was changed on 11/6/2018 by urology.       Review of Systems   Constitutional: Positive for fatigue.      All pertinent negatives and positives are as above. All other systems have been reviewed and are negative unless otherwise stated.     Objective    Temp:  [96.1 °F (35.6 °C)-98.2 °F (36.8 °C)] 96.1 °F (35.6 °C)  Heart Rate:  [] 100  Resp:  [16-18] 18  BP: (130-143)/(60-71) 132/62    Physical Exam   Constitutional: He is oriented to person, place, and time. He appears well-developed and well-nourished.   HENT:   Head: Normocephalic and atraumatic.   Eyes: EOM are normal. Pupils are equal, round, and reactive to light.   Neck: Normal range of motion. Neck supple.   Cardiovascular: Normal rate and  regular rhythm.   Pulmonary/Chest: Effort normal and breath sounds normal.   Abdominal: Soft. Bowel sounds are normal.   Musculoskeletal: Normal range of motion.   Neurological: He is alert and oriented to person, place, and time.   Skin: Skin is warm and dry.   Psychiatric: He has a normal mood and affect.     Results Review:  I have reviewed the labs, radiology results, and diagnostic studies.    Laboratory Data:   Results from last 7 days   Lab Units  11/11/18   0541  11/10/18   0601  11/09/18   0550   SODIUM mmol/L  141  143  142   POTASSIUM mmol/L  3.4*  3.5  3.7   CHLORIDE mmol/L  104  104  105   CO2 mmol/L  29.0  31.0  29.0   BUN mg/dL  30*  28*  23*   CREATININE mg/dL  1.05  1.21  1.15   GLUCOSE mg/dL  96  95  97   CALCIUM mg/dL  8.9  9.2  8.9   BILIRUBIN mg/dL  0.3  0.5  0.9   ALK PHOS U/L  95  84  73   ALT (SGPT) U/L  14*  8*  11*   AST (SGOT) U/L  34  25  19   ANION GAP mmol/L  8.0  8.0  8.0     Estimated Creatinine Clearance: 52.3 mL/min (by C-G formula based on SCr of 1.05 mg/dL).  Results from last 7 days   Lab Units  11/05/18   1733   MAGNESIUM mg/dL  2.0         Results from last 7 days   Lab Units  11/11/18   0715  11/10/18   0601  11/09/18   0550  11/08/18   1936  11/08/18   0518  11/07/18   0544   WBC 10*3/mm3  6.89  7.01  6.74   --   5.36  6.83   HEMOGLOBIN g/dL  9.5*  10.0*  9.1*  9.8*  7.2*  7.8*   HEMATOCRIT %  29.8*  31.6*  27.9*  30.4*  22.9*  25.6*   PLATELETS 10*3/mm3  224  243  228   --   228  250           Culture Data:   No results found for: BLOODCX  No results found for: URINECX  No results found for: RESPCX  No results found for: WOUNDCX  No results found for: STOOLCX  No components found for: BODYFLD    Radiology Data:   Imaging Results (last 24 hours)     ** No results found for the last 24 hours. **          I have reviewed the patient's current medications.     Assessment/Plan     Active Hospital Problems    Diagnosis Date Noted   • PVD (peripheral vascular disease) with  claudication (CMS/[facility]) [I73.9] 10/23/2018     Added automatically from request for surgery [id number]     • Acute cystitis with hematuria [N30.01] 09/08/2018   • Tricuspid regurgitation [I07.1] 07/16/2018   • Pulmonary hypertension (CMS/[facility]) [I27.20] 07/16/2018   • CKD (chronic kidney disease) stage 3, GFR 30-59 ml/min (CMS/[facility]) [N18.3] 07/16/2018   • (HFpEF) heart failure with preserved ejection fraction (CMS/[facility]) [I50.30] 05/21/2018   • Pure hypercholesterolemia [E78.00]    • Hypertensive disorder [I10]        Plan:    1.  Acute cystitis: Klebsiella oxytoca.  Continue Rocephin.   2.  Anemia:  Oral iron started.  2 units of PRBC given 11/8/2018. Hemoglobin has remained stable since then.    3.  CKD stage III: Appear to be doing well continue to monitor.  4.  Hypertension,stable: Continue current medications.  5.  Hyperlipidemia: Statin.  6.  Heart failure with preserved ejection fraction: Patient does not appear to be in heart failure at this point we'll continue monitor.  7.  Deconditioning PT/OT.  8.  Hypokalemia:  Potassium protocol.      Case management working with APS and family about appropriate discharge.     Discharge Planning: I expect patient to be discharged to home in 1-2 days.      This document has been electronically signed by JUSTIN Daivd on November 11, 2018 1:15 PM         full weight-bearing

## 2024-10-29 NOTE — PROGRESS NOTE ADULT - PROBLEM SELECTOR PLAN 5
EKG w/ known RBBB/LAFB. T-wave inversions noted on EKG 10/27 on V4/V5 compared to 2021. Denies CP. Trops downtrending now. CK/CKMB wnl.    - See CHF plan above

## 2024-10-29 NOTE — CONSULT NOTE ADULT - CONSULT REASON
respiratory distress
gram positive cocci in clusters noted in blood cultures
Suspected nephrotic syndrome
LE cellulitis, c/f ADHF
PM&R

## 2024-10-29 NOTE — CONSULT NOTE ADULT - SUBJECTIVE AND OBJECTIVE BOX
Patient is a 84y old  Male who presents with a chief complaint of Leg Swelling (29 Oct 2024 11:56)      HPI:  CRISTOBAL PORRAS is a 84y year old Male with a PMHx significant for HTN, HLD, CAD (last PCI 18y ago), CHF (no echo on file), CKD (Cr 1.4 in 9/21), GERD, stroke (residual RUE weakness), anxiety/depression, brought to ED by girlfriend Tanya, for leg swelling, redness, and oozing. Patient reports that his leg swelling has been on-going for approximately 3 weeks. Has gotten worse in the last 3 days, to the point he felt he needed to come in. Was dropped off by his girlfriend Tanya Lucas, however, she was not at bedside to provide further collateral. Patient has an extensive history of smoking approximately 70 years. Denies any similar episodes to this, although upon chart review, patient was admitted for bilateral lower extremity swelling and left leg non-purulent cellulitis in 9/2021. Denies any history of heart problems, fevers, chills, abdominal pain, chest pain. Endorses shortness of breath with exertion, orthopnea, leg.     Patient is unsure exactly what medications he takes at home but when shown a list of medications from [facility], he endorses that he takes these medications. Patient does not remember the name of his pharmacy - pharmacy's in sure script show [facility] on [address]  & [facility] [address] Per [email or web address], his PCP appears to be [facility].     ED COURSE  Vitals: T(C): 36.3 (10-27-24 @ 17:14), Max: 36.4 (10-27-24 @ 14:25); HR: 61 (10-27-24 @ 17:14) (61 - 74); BP: 148/94 (10-27-24 @ 17:14) (130/81 - 148/94); RR: 20 (10-27-24 @ 17:14) (18 - 22); SpO2: 96% (10-27-24 @ 17:14) (92% - 97%)  Labs: WBC 13.82 (89.5% neutrophils), lactate 2.3, 2.1 s/p 500cc, creatinine 2.12, .7, ESR 34, BNP 8629, trop 76  EKG: normal sinus w/ left axis deviation, RBBB (seen on EKG 2021), LVH, T wave inversions in V4/V5/V6 (V4/V5 not seen in 2021)  Imaging: CXR wet read showing increased pulmonary vasculature but not pleural effusions or consolidations  Interventions: 500cc 0.9% NS, 20mg lasix IV , 1000mg vanc , 3.375g zosyn  Consults: ICU team for likely plash pulmonary edema 2/2 500cc fluid bolus   (27 Oct 2024 17:21)    PAST MEDICAL & SURGICAL HISTORY:  HTN (hypertension)      Arthritis      GERD (gastroesophageal reflux disease)      Chronic CHF      CAD (coronary artery disease)      Constipation      Depression      Chronic renal insufficiency      Cellulitis, leg      HTN (hypertension)      HLD (hyperlipidemia)      GERD (gastroesophageal reflux disease)      Chronic systolic congestive heart failure      CAD (coronary artery disease)      History of BPH      Constipation      Depression      Anxiety      Chronic renal insufficiency      H/O hernia repair        MEDICATIONS  (STANDING):  aspirin  chewable 81 milliGRAM(s) Oral daily  atorvastatin 40 milliGRAM(s) Oral at bedtime  cloNIDine 0.1 milliGRAM(s) Oral two times a day  enoxaparin Injectable 30 milliGRAM(s) SubCutaneous every 24 hours  furosemide   Injectable 40 milliGRAM(s) IV Push every 12 hours  magnesium sulfate  IVPB 2 Gram(s) IV Intermittent once  metoprolol succinate ER 50 milliGRAM(s) Oral daily  multivitamin 1 Tablet(s) Oral daily  pantoprazole    Tablet 40 milliGRAM(s) Oral before breakfast  vancomycin  IVPB 1000 milliGRAM(s) IV Intermittent every 24 hours    MEDICATIONS  (PRN):  acetaminophen     Tablet .. 650 milliGRAM(s) Oral every 6 hours PRN Temp greater or equal to 38C (100.4F), Mild Pain (1 - 3)  ondansetron Injectable 4 milliGRAM(s) IV Push every 8 hours PRN Nausea and/or Vomiting          Home Living Status :  lives with girlfriend in an elevator accessible apartment building          -  Home Services :  none         Baseline Functional Level Prior to Admission :             - ADL's/ IADL's :  independent , requires partial assistance with          - Ambulatory status prior to admission :   walked with a rollator         FAMILY HISTORY:  No pertinent family history in first degree relatives        CBC Full  -  ( 29 Oct 2024 05:30 )  WBC Count : 6.79 K/uL  RBC Count : 4.15 M/uL  Hemoglobin : 12.2 g/dL  Hematocrit : 40.7 %  Platelet Count - Automated : 94 K/uL  Mean Cell Volume : 98.1 fl  Mean Cell Hemoglobin : 29.4 pg  Mean Cell Hemoglobin Concentration : 30.0 gm/dL  Auto Neutrophil # : 5.87 K/uL  Auto Lymphocyte # : 0.62 K/uL  Auto Monocyte # : 0.24 K/uL  Auto Eosinophil # : 0.06 K/uL  Auto Basophil # : 0.00 K/uL  Auto Neutrophil % : 86.4 %  Auto Lymphocyte % : 9.1 %  Auto Monocyte % : 3.6 %  Auto Eosinophil % : 0.9 %  Auto Basophil % : 0.0 %      10-29    143  |  98  |  37[H]  ----------------------------<  94  4.5   |  38[H]  |  2.07[H]    Ca    8.5      29 Oct 2024 05:30  Phos  4.1     10-29  Mg     1.7     10-29        Urinalysis Basic - ( 29 Oct 2024 05:30 )    Color: x / Appearance: x / SG: x / pH: x  Gluc: 94 mg/dL / Ketone: x  / Bili: x / Urobili: x   Blood: x / Protein: x / Nitrite: x   Leuk Esterase: x / RBC: x / WBC x   Sq Epi: x / Non Sq Epi: x / Bacteria: x        Radiology :     < from: Xray Chest 1 View-PORTABLE IMMEDIATE (Xray Chest 1 View-PORTABLE IMMEDIATE .) (10.27.24 @ 14:46) >  ACC: [id number] EXAM:  XR CHEST PORTABLE IMMED 1V   ORDERED BY: DAVE VAIL     PROCEDURE DATE:  10/27/2024          INTERPRETATION:  XR CHEST IMMEDIATE dated 10/27/2024 at 2:37 PM    CLINICAL INFORMATION: weakness, leg swelling    TECHNIQUE: Portable frontal view(s) of the chest.    COMPARISON: Chest radiograph(s) dated 9/3/2021    FINDINGS/  IMPRESSION:    No consolidations. No pleural effusions. No pneumothorax. Degenerative   changes of the spine. No acute abnormalities of the soft tissues and   osseous structures.           Review of Systems : per HPI         Vital Signs Last 24 Hrs  T(C): 37 (29 Oct 2024 09:38), Max: 37 (29 Oct 2024 09:38)  T(F): 98.6 (29 Oct 2024 09:38), Max: 98.6 (29 Oct 2024 09:38)  HR: 70 (29 Oct 2024 09:38) (66 - 77)  BP: 133/76 (29 Oct 2024 09:38) (129/75 - 156/84)  BP(mean): --  RR: 18 (29 Oct 2024 09:38) (17 - 18)  SpO2: 98% (29 Oct 2024 09:38) (93% - 100%)    Parameters below as of 29 Oct 2024 09:38  Patient On (Oxygen Delivery Method): nasal cannula            Physical Exam:  84 y o man lying comfortably in semi Willis's position , awake , alert , no acute complaints     Head: normocephalic , atraumatic    Eyes: PERRLA , EOMI , no nystagmus , sclera anicteric    ENT / FACE: neg nasal discharge , uvula midline , no oropharyngeal erythema / exudate    Neck: supple , negative JVD , negative carotid bruits , no thyromegaly    Chest: CTA bilaterally     Cardiovascular: regular rate and rhythm , neg murmurs / rubs / gallops    Abdomen: soft , non distended , no tenderness to palpation in all 4 quadrants ,  normal bowel sounds     Lower Extremities: 3+ lower extremity edema w/ erythema and hyperpigmented discoloration up to mid-shin, ttp , ulcerations on R lateral leg,    Neurologic Exam:     Alert and oriented  x 3     Motor Exam:        > 4/5 x 3 extremities , R UE 3-/5     Sensation:         intact to light touch x 4 extremities     DTR:           biceps/brachioradialis: equal                            patella/ankle: equal       Gait:  not tested         PM&R Impression: admitted for 3 week history of leg swelling, redness and blistering     - AHRF, HF exacerbation and non-purulent cellulitis    - deconditioned       Recommendations / Plan:       1) Physical / Occupational therapy focusing on therapeutic exercises , equipment evaluation , bed mobility/transfer out of bed evaluation , progressive ambulation with assistive devices prn .    2) Current disposition plan recommendation:    pending functional progress

## 2024-10-30 LAB
-  CLINDAMYCIN: SIGNIFICANT CHANGE UP
-  ERYTHROMYCIN: SIGNIFICANT CHANGE UP
-  GENTAMICIN: SIGNIFICANT CHANGE UP
-  OXACILLIN: SIGNIFICANT CHANGE UP
-  RIFAMPIN: SIGNIFICANT CHANGE UP
-  TETRACYCLINE: SIGNIFICANT CHANGE UP
-  TRIMETHOPRIM/SULFAMETHOXAZOLE: SIGNIFICANT CHANGE UP
-  VANCOMYCIN: SIGNIFICANT CHANGE UP
ADD ON TEST-SPECIMEN IN LAB: SIGNIFICANT CHANGE UP
ALBUMIN SERPL ELPH-MCNC: 2.7 G/DL — LOW (ref 3.3–5)
ALP SERPL-CCNC: 46 U/L — SIGNIFICANT CHANGE UP (ref 40–120)
ALT FLD-CCNC: 14 U/L — SIGNIFICANT CHANGE UP (ref 10–45)
ANION GAP SERPL CALC-SCNC: 8 MMOL/L — SIGNIFICANT CHANGE UP (ref 5–17)
AST SERPL-CCNC: 26 U/L — SIGNIFICANT CHANGE UP (ref 10–40)
BILIRUB SERPL-MCNC: 0.7 MG/DL — SIGNIFICANT CHANGE UP (ref 0.2–1.2)
BUN SERPL-MCNC: 33 MG/DL — HIGH (ref 7–23)
CALCIUM SERPL-MCNC: 8.5 MG/DL — SIGNIFICANT CHANGE UP (ref 8.4–10.5)
CHLORIDE SERPL-SCNC: 95 MMOL/L — LOW (ref 96–108)
CK SERPL-CCNC: 42 U/L — SIGNIFICANT CHANGE UP (ref 30–200)
CO2 SERPL-SCNC: 40 MMOL/L — HIGH (ref 22–31)
CREAT SERPL-MCNC: 2.06 MG/DL — HIGH (ref 0.5–1.3)
CULTURE RESULTS: ABNORMAL
CULTURE RESULTS: ABNORMAL
D DIMER BLD IA.RAPID-MCNC: 689 NG/ML DDU — HIGH
EGFR: 31 ML/MIN/1.73M2 — LOW
GLUCOSE SERPL-MCNC: 89 MG/DL — SIGNIFICANT CHANGE UP (ref 70–99)
METHOD TYPE: SIGNIFICANT CHANGE UP
ORGANISM # SPEC MICROSCOPIC CNT: ABNORMAL
ORGANISM # SPEC MICROSCOPIC CNT: ABNORMAL
ORGANISM # SPEC MICROSCOPIC CNT: SIGNIFICANT CHANGE UP
ORGANISM # SPEC MICROSCOPIC CNT: SIGNIFICANT CHANGE UP
POTASSIUM SERPL-MCNC: 4.2 MMOL/L — SIGNIFICANT CHANGE UP (ref 3.5–5.3)
POTASSIUM SERPL-SCNC: 4.2 MMOL/L — SIGNIFICANT CHANGE UP (ref 3.5–5.3)
PROT SERPL-MCNC: 6.5 G/DL — SIGNIFICANT CHANGE UP (ref 6–8.3)
RAPID RVP RESULT: SIGNIFICANT CHANGE UP
SARS-COV-2 RNA SPEC QL NAA+PROBE: SIGNIFICANT CHANGE UP
SODIUM SERPL-SCNC: 143 MMOL/L — SIGNIFICANT CHANGE UP (ref 135–145)
SPECIMEN SOURCE: SIGNIFICANT CHANGE UP
SPECIMEN SOURCE: SIGNIFICANT CHANGE UP

## 2024-10-30 PROCEDURE — 71045 X-RAY EXAM CHEST 1 VIEW: CPT | Mod: 26

## 2024-10-30 PROCEDURE — 99233 SBSQ HOSP IP/OBS HIGH 50: CPT

## 2024-10-30 PROCEDURE — 99222 1ST HOSP IP/OBS MODERATE 55: CPT | Mod: GC

## 2024-10-30 PROCEDURE — 99232 SBSQ HOSP IP/OBS MODERATE 35: CPT

## 2024-10-30 RX ORDER — FUROSEMIDE 40 MG
40 TABLET ORAL EVERY 24 HOURS
Refills: 0 | Status: DISCONTINUED | OUTPATIENT
Start: 2024-10-31 | End: 2024-11-01

## 2024-10-30 RX ORDER — HYDRALAZINE HYDROCHLORIDE 50 MG/1
25 TABLET, FILM COATED ORAL THREE TIMES A DAY
Refills: 0 | Status: DISCONTINUED | OUTPATIENT
Start: 2024-10-30 | End: 2024-10-31

## 2024-10-30 RX ORDER — ACETAZOLAMIDE EXTENDED-RELEASE 500 MG/1
500 CAPSULE ORAL ONCE
Refills: 0 | Status: COMPLETED | OUTPATIENT
Start: 2024-10-30 | End: 2024-10-30

## 2024-10-30 RX ADMIN — Medication 40 MILLIGRAM(S): at 23:58

## 2024-10-30 RX ADMIN — PANTOPRAZOLE SODIUM 40 MILLIGRAM(S): 40 TABLET, DELAYED RELEASE ORAL at 06:04

## 2024-10-30 RX ADMIN — DAPTOMYCIN 120 MILLIGRAM(S): 500 INJECTION, POWDER, LYOPHILIZED, FOR SOLUTION INTRAVENOUS at 18:57

## 2024-10-30 RX ADMIN — Medication 30 MILLIGRAM(S): at 10:31

## 2024-10-30 RX ADMIN — Medication 50 MILLIGRAM(S): at 06:04

## 2024-10-30 RX ADMIN — CLONIDINE HYDROCHLORIDE 0.1 MILLIGRAM(S): 0.2 TABLET ORAL at 18:57

## 2024-10-30 RX ADMIN — Medication 40 MILLIGRAM(S): at 06:05

## 2024-10-30 RX ADMIN — HYDRALAZINE HYDROCHLORIDE 25 MILLIGRAM(S): 50 TABLET, FILM COATED ORAL at 13:20

## 2024-10-30 RX ADMIN — CLONIDINE HYDROCHLORIDE 0.1 MILLIGRAM(S): 0.2 TABLET ORAL at 06:04

## 2024-10-30 RX ADMIN — Medication 81 MILLIGRAM(S): at 11:55

## 2024-10-30 RX ADMIN — Medication 1 TABLET(S): at 10:31

## 2024-10-30 RX ADMIN — ACETAZOLAMIDE EXTENDED-RELEASE 110 MILLIGRAM(S): 500 CAPSULE ORAL at 15:04

## 2024-10-30 RX ADMIN — HYDRALAZINE HYDROCHLORIDE 25 MILLIGRAM(S): 50 TABLET, FILM COATED ORAL at 23:58

## 2024-10-30 NOTE — PROGRESS NOTE ADULT - PROBLEM SELECTOR PLAN 6
Pt w/ hx of CAD (last cardiac cath 18yrs ago). EKG w/ new T wave inversions (10/27) and previous RBBB, left axis deviation (2021). Denies CP, SOB, palpitations.  - c/w [id number]        - patient says he is taking 325mg aspirin otc at home; will need conversation regarding aspirin prior to discharge  - c/w atorvastatin 40mg

## 2024-10-30 NOTE — PROGRESS NOTE ADULT - ASSESSMENT
Subjective:     Anita Davis  is a 37 y.o. female who presents for follow-up about 13 days following laparotomy / ileocecal intussusception . Pain assessment - 2/10    Surgery related complication: NA    Surgical Pathology/Bx have been discussed       She reports improvement in her overall symptoms since being seen 72 hours ago and denies vomiting and difficulty breathing. The patient's exercise level: not active. Changes in her medical history and medications have been reviewed. Patient Active Problem List   Diagnosis Code    Constipation K59.00    History of colon polyps Z86.010    Intestinal malabsorption K90.9    S/P gastric bypass Z98.84    Hypokalemia E87.6    Hypomagnesemia E83.42    Iron deficiency E61.1    Pica F50.89    Intertriginous candidiasis B37.2    Internal hernia K45.8    Small bowel obstruction ([address] K56.609    History of intussusception Z87.19     Past Medical History:   Diagnosis Date    Constipation     Functional dyspepsia     uses OTC meds    GERD (gastroesophageal reflux disease)     History of colon polyps     History of intussusception 2020    1/15/2020 Diagnostic laparoscopy changed to open laparotomy by Dr. Corral Prom Distal small bowel resection with Ileocecal resection with anastomosis.     Morbid obesity ([address]     Morbid obesity with body mass index (BMI) of 40.0 to 49.9 (HCC)     Nausea & vomiting 2018    SBO (small bowel obstruction) (HCC)      Past Surgical History:   Procedure Laterality Date    ABDOMEN SURGERY PROC UNLISTED      adhensions on liver    COLONOSCOPY      X 3    DELIVERY       X 3    HX  SECTION      HX COLECTOMY      HX DILATION AND CURETTAGE      HX GASTRIC BYPASS      HX GI      gastric bypass    HX OTHER SURGICAL      scar resection back of head    HX TUBAL LIGATION      HX UROLOGICAL  2001    stent    IR URETERAL STENT PLACEMENT         Objective:     Visit Vitals  /69 (BP 1 Location: Left arm, BP Patient Position: Sitting)   Pulse 91   Temp 97.6 °F (36.4 °C)   Ht 4' 10\" (1.473 m)   Wt 59.2 kg (130 lb 8 oz)   LMP 01/21/2020 (Exact Date)   SpO2 100%   BMI 27.27 kg/m²        Physical Exam:    General:  alert, cooperative, no distress, appears stated age   Pulm: clear to auscultation bilaterally   Heart:  Regular rate and rhythm   Abdomen:   abdomen is soft without significant tenderness, masses, organomegaly or guarding; Incisions: healing well       Assessment:     1. History of Morbid obesity, status post  laparotomy. The patient will have all staples removed today. Seen by Dr. Humberto Garza:     1.  Elieser removed 84y year old Male with a PMHx significant for CHF, HTN, HLD, CAD, CKD, GERD, anxiety/depression, brought to ED by family, for 3 week history of leg swelling, redness and blistering that has worsened in the last 3 days and found to have cellulitis, ED course c/b respiratory distress after IV fluids thought to be HF exacerbation improving after IV lasix. Now monitoring AHRF, HF exacerbation and non-purulent cellulitis.

## 2024-10-30 NOTE — PROGRESS NOTE ADULT - ATTENDING COMMENTS
84 M with HTN, HLD, CAD s/p remote PCI, CKD (most recent Cr 1.4 in 2021), stroke with residual RUE weakness, who was BIB family for bilateral lower extremity swelling found to be in severe sepsis with Staph bacteremia likely 2/2 RLE cellulitis, c/c/b ADHF.    Transitioned to dapto per ID for methicillin-resistant staph haemolyticus. Today with continued improvement in bilateral LE swelling with IV diuresis. Today room air sat at rest ~80% with appropriate waveform, deep inspiration. Mild crackles at L base.    #staph bacteremia  #severe sepsis 2/2 non-purulent RLE cellulitis   - monitor for clinical improvement  - f/u BCx 10/27 - staph haemolyticus, methicillin-resistant Staph epi  - f/u BCx 10/28 NGTD  - ID c/s  - IV dapto while inpatient, transition to linezolid on d/c    #KALA on CKD 2/2 ATN - Cr 2.1, baseline 1.68  - +granular casts, +proteinuria  - likely sepsis mediated ATN  - RBUS without hydro, has multiple large renal cysts    #CAD s/p remote PCI  #type II NSTEMI  #ADHF, chronic HFpEF  #HTN  - ASA, statin  - daily weights, I/Os  - has not seen cardiologist in many years, no known recent ischemic work up (prior with non-obstructive CAD in 2015)  - c/w toprol 50  - hold ACE-I, defer SGLT2i iso KALA  - continue clonidine to prevent rebound hypertension  - start hydral for afterload reduction  - c/w IV diuresis today with plans to transition to PO tomorrow  - diamox 500 x 1    #hypoxia  - was not on home O2  - in ED was hypoxic following IVF administration  - continues to be hypoxic despite adequate diuresis  - CXR without congestion or opacification  - IS/OOTC, optimize respiratory status  - obtain ABG for eval of hypoxemia, hypercarbia  - send d-dimer

## 2024-10-30 NOTE — PROGRESS NOTE ADULT - SUBJECTIVE AND OBJECTIVE BOX
**Incomplete Note**    Cardiology Consult    O/N:  Interval History/HPI: Pt seen and examined at bedside, NAD, denies chest pain/discomfort/pressure. ROS unremarkable   Telemetry:      Review of Systems:  CONSTITUTIONAL:  No weight loss, fever, chills, weakness or fatigue.  HEENT:  Eyes:  No visual loss, blurred vision, double vision or yellow sclerae. Ears, Nose, Throat:  No hearing loss, sneezing, congestion, runny nose or sore throat.  SKIN:  No rash or itching.  CARDIOVASCULAR:  No chest pain, chest pressure or chest discomfort. No palpitations or edema.  RESPIRATORY:  No shortness of breath, cough or sputum.  GASTROINTESTINAL:  No anorexia, nausea, vomiting or diarrhea. No abdominal pain or blood.  GENITOURINARY: No Burning on urination.   NEUROLOGICAL:  see HPI  MUSCULOSKELETAL:  No muscle, back pain, joint pain or stiffness.  HEMATOLOGIC:  No anemia, bleeding or bruising.  LYMPHATICS:  No enlarged nodes. No history of splenectomy.  PSYCHIATRIC:  No history of depression or anxiety.  ENDOCRINOLOGIC:  No reports of sweating, cold or heat intolerance. No polyuria or polydipsia.  ALLERGIES:  No history of asthma, hives, eczema or rhinitis.    OBJECTIVE  T(C): 36.7 (10-30-24 @ 09:51), Max: 36.7 (10-30-24 @ 09:51)  HR: 75 (10-30-24 @ 09:51) (73 - 78)  BP: 166/83 (10-30-24 @ 09:51) (138/66 - 166/83)  RR: 18 (10-30-24 @ 09:51) (16 - 24)  SpO2: 100% (10-30-24 @ 09:51) (93% - 100%)    10-29-24 @ 07:01  -  10-30-24 @ 07:00  --------------------------------------------------------  IN: 0 mL / OUT: 775 mL / NET: -775 mL    10-30-24 @ 07:01  -  10-30-24 @ 11:53  --------------------------------------------------------  IN: 0 mL / OUT: 450 mL / NET: -450 mL        PHYSICAL EXAM:    Constitutional: resting comfortably in bed; NAD  HEENT: NC/AT, PERRL, EOMI, anicteric sclera, no nasal discharge; uvula midline, no oropharyngeal erythema or exudates; MMM  Neck: supple; no thyromegaly, JVP ? cm H20, JVD +/-  Respiratory: CTA B/L; no W/R/R, no retractions  Cardiac: +S1/S2; RRR; no M/R/G; PMI non-displaced  Gastrointestinal: soft, NT/ND; no rebound or guarding; +BSx4  Extremities: WWP, no clubbing or cyanosis; no peripheral edema  Musculoskeletal: NROM x4; no joint swelling, tenderness or erythema  Vascular: 2+ radial, DP/PT pulses B/L  Dermatologic: skin warm, dry and intact; no rashes, wounds, or scars  Lymphatic: no submandibular or cervical LAD  Neurologic: AAOx3; CNII-XII grossly intact; no focal deficits    LABS:                        12.2   6.79  )-----------( 94       ( 29 Oct 2024 05:30 )             40.7     10-30    143  |  95[L]  |  33[H]  ----------------------------<  89  4.2   |  40[H]  |  2.06[H]    Ca    8.5      30 Oct 2024 05:30  Phos  4.1     10-29  Mg     1.7     10-29    TPro  6.5  /  Alb  2.7[L]  /  TBili  0.7  /  DBili  x   /  AST  26  /  ALT  14  /  AlkPhos  46  10-30      Urinalysis Basic - ( 30 Oct 2024 05:30 )    Color: x / Appearance: x / SG: x / pH: x  Gluc: 89 mg/dL / Ketone: x  / Bili: x / Urobili: x   Blood: x / Protein: x / Nitrite: x   Leuk Esterase: x / RBC: x / WBC x   Sq Epi: x / Non Sq Epi: x / Bacteria: x        RADIOLOGY & ADDITIONAL TESTS:  Reviewed .    MEDICATIONS  (STANDING):  aspirin  chewable 81 milliGRAM(s) Oral daily  atorvastatin 40 milliGRAM(s) Oral at bedtime  cloNIDine 0.1 milliGRAM(s) Oral two times a day  DAPTOmycin IVPB 500 milliGRAM(s) IV Intermittent every 24 hours  enoxaparin Injectable 30 milliGRAM(s) SubCutaneous every 24 hours  metoprolol succinate ER 50 milliGRAM(s) Oral every 24 hours  multivitamin 1 Tablet(s) Oral daily  pantoprazole    Tablet 40 milliGRAM(s) Oral before breakfast    MEDICATIONS  (PRN):  acetaminophen     Tablet .. 650 milliGRAM(s) Oral every 6 hours PRN Temp greater or equal to 38C (100.4F), Mild Pain (1 - 3)  ondansetron Injectable 4 milliGRAM(s) IV Push every 8 hours PRN Nausea and/or Vomiting     **Incomplete Note**    Cardiology Consult    O/N: No acute events overnight.  Interval History/HPI: Pt seen and examined at bedside. Patient was laying comfortably in bed, in NAD, denies chest pain/discomfort/pressure. ROS unremarkable.      Review of Systems:  CONSTITUTIONAL:  No weight loss, fever, chills, weakness or fatigue.  HEENT:  Eyes:  No visual loss, blurred vision, double vision or yellow sclerae. Ears, Nose, Throat:  No hearing loss, sneezing, congestion, runny nose or sore throat.  SKIN:  No rash or itching.  CARDIOVASCULAR:  No chest pain, chest pressure or chest discomfort. No palpitations or edema.  RESPIRATORY:  No shortness of breath, cough or sputum.  GASTROINTESTINAL:  No anorexia, nausea, vomiting or diarrhea. No abdominal pain or blood.  GENITOURINARY: No Burning on urination.   NEUROLOGICAL:  see HPI  MUSCULOSKELETAL:  No muscle, back pain, joint pain or stiffness.  HEMATOLOGIC:  No anemia, bleeding or bruising.  LYMPHATICS:  No enlarged nodes. No history of splenectomy.  PSYCHIATRIC:  No history of depression or anxiety.  ENDOCRINOLOGIC:  No reports of sweating, cold or heat intolerance. No polyuria or polydipsia.  ALLERGIES:  No history of asthma, hives, eczema or rhinitis.    OBJECTIVE  T(C): 36.7 (10-30-24 @ 09:51), Max: 36.7 (10-30-24 @ 09:51)  HR: 75 (10-30-24 @ 09:51) (73 - 78)  BP: 166/83 (10-30-24 @ 09:51) (138/66 - 166/83)  RR: 18 (10-30-24 @ 09:51) (16 - 24)  SpO2: 100% (10-30-24 @ 09:51) (93% - 100%)    10-29-24 @ 07:01  -  10-30-24 @ 07:00  --------------------------------------------------------  IN: 0 mL / OUT: 775 mL / NET: -775 mL    10-30-24 @ 07:01  -  10-30-24 @ 11:53  --------------------------------------------------------  IN: 0 mL / OUT: 450 mL / NET: -450 mL        PHYSICAL EXAM:    Constitutional: resting comfortably in bed; NAD  HEENT: NC/AT, PERRL, EOMI, anicteric sclera, no nasal discharge; uvula midline, no oropharyngeal erythema or exudates; MMM  Neck: supple; no thyromegaly, no evidence of elevated JVD  Respiratory: CTA B/L; no W/R/R, no retractions  Cardiac: +S1/S2; RRR; no M/R/G; PMI non-displaced  Gastrointestinal: soft, NT/ND; no rebound or guarding; +BSx4  Extremities: WWP, no clubbing or cyanosis; 1+ b/l LE pitting edema  Musculoskeletal: NROM x4; no joint swelling, tenderness or erythema  Vascular: 2+ radial, DP/PT pulses B/L  Dermatologic: skin warm, dry and intact; no rashes, wounds, or scars  Lymphatic: no submandibular or cervical LAD  Neurologic: AAOx3    LABS:                        12.2   6.79  )-----------( 94       ( 29 Oct 2024 05:30 )             40.7     10-30    143  |  95[L]  |  33[H]  ----------------------------<  89  4.2   |  40[H]  |  2.06[H]    Ca    8.5      30 Oct 2024 05:30  Phos  4.1     10-29  Mg     1.7     10-29    TPro  6.5  /  Alb  2.7[L]  /  TBili  0.7  /  DBili  x   /  AST  26  /  ALT  14  /  AlkPhos  46  10-30      Urinalysis Basic - ( 30 Oct 2024 05:30 )    Color: x / Appearance: x / SG: x / pH: x  Gluc: 89 mg/dL / Ketone: x  / Bili: x / Urobili: x   Blood: x / Protein: x / Nitrite: x   Leuk Esterase: x / RBC: x / WBC x   Sq Epi: x / Non Sq Epi: x / Bacteria: x        RADIOLOGY & ADDITIONAL TESTS:  10/29/24 Echo: EF 50%, mild symmetric LVH, mildly reduced LV systolic function, grade II LV diastolic dysfunction, normal RV size and systolic function, severely dilated LA, aortic sclerosis without significant stenosis, trace aortic regurgitation, PaSP 35mmHg, no pericardial effusion  10/27/24 Chest XR: No consolidations. No pleural effusions. No pneumothorax. Degenerative changes of the spine. No acute abnormalities of the soft tissues and osseous structures.    MEDICATIONS  (STANDING):  aspirin  chewable 81 milliGRAM(s) Oral daily  atorvastatin 40 milliGRAM(s) Oral at bedtime  cloNIDine 0.1 milliGRAM(s) Oral two times a day  DAPTOmycin IVPB 500 milliGRAM(s) IV Intermittent every 24 hours  enoxaparin Injectable 30 milliGRAM(s) SubCutaneous every 24 hours  metoprolol succinate ER 50 milliGRAM(s) Oral every 24 hours  multivitamin 1 Tablet(s) Oral daily  pantoprazole    Tablet 40 milliGRAM(s) Oral before breakfast    MEDICATIONS  (PRN):  acetaminophen     Tablet .. 650 milliGRAM(s) Oral every 6 hours PRN Temp greater or equal to 38C (100.4F), Mild Pain (1 - 3)  ondansetron Injectable 4 milliGRAM(s) IV Push every 8 hours PRN Nausea and/or Vomiting     Cardiology Consult    O/N: No acute events overnight.  Interval History/HPI: Pt seen and examined at bedside. Patient was laying comfortably in bed, in NAD, denies chest pain/discomfort/pressure. ROS unremarkable.      Review of Systems:  CONSTITUTIONAL:  No weight loss, fever, chills, weakness or fatigue.  HEENT:  Eyes:  No visual loss, blurred vision, double vision or yellow sclerae. Ears, Nose, Throat:  No hearing loss, sneezing, congestion, runny nose or sore throat.  SKIN:  No rash or itching.  CARDIOVASCULAR:  No chest pain, chest pressure or chest discomfort. No palpitations or edema.  RESPIRATORY:  No shortness of breath, cough or sputum.  GASTROINTESTINAL:  No anorexia, nausea, vomiting or diarrhea. No abdominal pain or blood.  GENITOURINARY: No Burning on urination.   NEUROLOGICAL:  see HPI  MUSCULOSKELETAL:  No muscle, back pain, joint pain or stiffness.  HEMATOLOGIC:  No anemia, bleeding or bruising.  LYMPHATICS:  No enlarged nodes. No history of splenectomy.  PSYCHIATRIC:  No history of depression or anxiety.  ENDOCRINOLOGIC:  No reports of sweating, cold or heat intolerance. No polyuria or polydipsia.  ALLERGIES:  No history of asthma, hives, eczema or rhinitis.    OBJECTIVE  T(C): 36.7 (10-30-24 @ 09:51), Max: 36.7 (10-30-24 @ 09:51)  HR: 75 (10-30-24 @ 09:51) (73 - 78)  BP: 166/83 (10-30-24 @ 09:51) (138/66 - 166/83)  RR: 18 (10-30-24 @ 09:51) (16 - 24)  SpO2: 100% (10-30-24 @ 09:51) (93% - 100%)    10-29-24 @ 07:01  -  10-30-24 @ 07:00  --------------------------------------------------------  IN: 0 mL / OUT: 775 mL / NET: -775 mL    10-30-24 @ 07:01  -  10-30-24 @ 11:53  --------------------------------------------------------  IN: 0 mL / OUT: 450 mL / NET: -450 mL        PHYSICAL EXAM:    Constitutional: resting comfortably in bed; NAD  HEENT: NC/AT, PERRL, EOMI, anicteric sclera, no nasal discharge; uvula midline, no oropharyngeal erythema or exudates; MMM  Neck: supple; no thyromegaly, no evidence of elevated JVD  Respiratory: CTA B/L; no W/R/R, no retractions  Cardiac: +S1/S2; RRR; no M/R/G; PMI non-displaced  Gastrointestinal: soft, NT/ND; no rebound or guarding; +BSx4  Extremities: WWP, no clubbing or cyanosis; 1+ b/l LE pitting edema  Musculoskeletal: NROM x4; no joint swelling, tenderness or erythema  Vascular: 2+ radial, DP/PT pulses B/L  Dermatologic: skin warm, dry and intact; no rashes, wounds, or scars  Lymphatic: no submandibular or cervical LAD  Neurologic: AAOx3        LABS:                        12.2   6.79  )-----------( 94       ( 29 Oct 2024 05:30 )             40.7     10-30    143  |  95[L]  |  33[H]  ----------------------------<  89  4.2   |  40[H]  |  2.06[H]    Ca    8.5      30 Oct 2024 05:30  Phos  4.1     10-29  Mg     1.7     10-29    TPro  6.5  /  Alb  2.7[L]  /  TBili  0.7  /  DBili  x   /  AST  26  /  ALT  14  /  AlkPhos  46  10-30      Urinalysis Basic - ( 30 Oct 2024 05:30 )    Color: x / Appearance: x / SG: x / pH: x  Gluc: 89 mg/dL / Ketone: x  / Bili: x / Urobili: x   Blood: x / Protein: x / Nitrite: x   Leuk Esterase: x / RBC: x / WBC x   Sq Epi: x / Non Sq Epi: x / Bacteria: x        RADIOLOGY & ADDITIONAL TESTS:  10/29/24 Echo: EF 50%, mild symmetric LVH, mildly reduced LV systolic function, grade II LV diastolic dysfunction, normal RV size and systolic function, severely dilated LA, aortic sclerosis without significant stenosis, trace aortic regurgitation, PaSP 35mmHg, no pericardial effusion  10/27/24 Chest XR: No consolidations. No pleural effusions. No pneumothorax. Degenerative changes of the spine. No acute abnormalities of the soft tissues and osseous structures.    MEDICATIONS  (STANDING):  aspirin  chewable 81 milliGRAM(s) Oral daily  atorvastatin 40 milliGRAM(s) Oral at bedtime  cloNIDine 0.1 milliGRAM(s) Oral two times a day  DAPTOmycin IVPB 500 milliGRAM(s) IV Intermittent every 24 hours  enoxaparin Injectable 30 milliGRAM(s) SubCutaneous every 24 hours  metoprolol succinate ER 50 milliGRAM(s) Oral every 24 hours  multivitamin 1 Tablet(s) Oral daily  pantoprazole    Tablet 40 milliGRAM(s) Oral before breakfast    MEDICATIONS  (PRN):  acetaminophen     Tablet .. 650 milliGRAM(s) Oral every 6 hours PRN Temp greater or equal to 38C (100.4F), Mild Pain (1 - 3)  ondansetron Injectable 4 milliGRAM(s) IV Push every 8 hours PRN Nausea and/or Vomiting

## 2024-10-30 NOTE — PROGRESS NOTE ADULT - PROBLEM SELECTOR PLAN 3
#HFpEF  Likely acute on chronic CHF in setting of IV fluid administration. On exam, chest clear to auscultation w/ b/l lower extremity edema to mid-shins, erythema and tenderness. No previous echo available. Labs remarkable for BNP of 8.6k, Trop still elevated, normal CKMB. CXR revealed clear lung fields. EKG w/ known RBBB/LAFB. New T-wave inversions noted on EKG today on V4/V5 compared to 2021. Pt denies CP. trops mildly elevated iso KALA, CK/CKMB wnl. TTE showing EF 50%. Appears to be first episode decompensated heart failure episode.   - Outpatient cardiology collateral:        - Dr. Kwesi Denny ([phone]), lost to f/u in 2015 after cardiac cath performed, no record of heart failure.        - Last cardiac cath 7/6/15 showed non-obstructive CAD, increased right-sided pressures, mild pulmonary HTN,          mildly increased PVRI, and decreased cardiac output. Proximal RCA showed mild diffuse disease.         - no previous echos. Echo 10/29 showed EF 50%, likely acute on chronic CHF exacerbation.   - Cardiology consulted, appreciate recs       - c/w lasix 40mg IV BID (D1 10/28)            - goal net negative 1-1.5L in 24 hrs       - c/w metoprolol 50mg to QD (from home BID dose, given active diuresis)        - Strict I/Os       - Daily weights       - c/w ASA 81mg & statin 40mg QD  - Wean O2 as tolerated above  - bicarb 41 today 10/31 for diuretic induced contraction alkalosis, given diamox 500    #Demand Ischemia  Patient with elevated troponin on admission, downtrending now. No chest pain, SOB. No signs of any ST elevations or depressions on EKG. Elevated troponin likely from demand ischemia in the setting of heart failure exacerbation. #HFpEF  Likely acute on chronic CHF in setting of IV fluid administration. On exam, chest clear to auscultation w/ b/l lower extremity edema to mid-shins, erythema and tenderness. No previous echo available. Labs remarkable for BNP of 8.6k, Trop still elevated, normal CKMB. CXR revealed clear lung fields. EKG w/ known RBBB/LAFB. New T-wave inversions noted on EKG today on V4/V5 compared to 2021. Pt denies CP. trops mildly elevated iso KALA, CK/CKMB wnl. TTE showing EF 50%. Appears to be first episode decompensated heart failure episode.   - Outpatient cardiology collateral:        - Dr. Kwesi Denny ([phone]), lost to f/u in 2015 after cardiac cath performed, no record of heart failure.        - Last cardiac cath 7/6/15 showed non-obstructive CAD, increased right-sided pressures, mild pulmonary HTN,          mildly increased PVRI, and decreased cardiac output. Proximal RCA showed mild diffuse disease.         - no previous echos. Echo 10/29 showed EF 50%, likely acute on chronic CHF exacerbation.   - Cardiology consulted, appreciate recs       - c/w lasix 40mg IV BID (D1 10/28)            - goal net negative 1-1.5L in 24 hrs       - c/w metoprolol 50mg to QD (from home BID dose, given active diuresis)        - Strict I/Os       - Daily weights       - c/w ASA 81mg & statin 40mg QD  - Wean O2 as tolerated above  - bicarb 41 today 10/31 for diuretic induced contraction alkalosis, given diamox 500  - Start Hydralazine 25mg TID for afterload reduction    #Demand Ischemia  Patient with elevated troponin on admission, downtrending now. No chest pain, SOB. No signs of any ST elevations or depressions on EKG. Elevated troponin likely from demand ischemia in the setting of heart failure exacerbation.

## 2024-10-30 NOTE — PROGRESS NOTE ADULT - PROBLEM SELECTOR PLAN 7
- c/w metoprolol 50mg QD (switched from home BID)  - c/w clonidine 0.1mg BID to prevent refractory HTN  - held home fosinopril 40mg & home HCTZ 12.5 mg qd given KALA - c/w metoprolol 50mg QD (switched from home BID)  - c/w clonidine 0.1mg BID to prevent refractory HTN  - held home fosinopril 40mg & home HCTZ 12.5 mg qd given KALA  - Start Hydralazine 25mg TID

## 2024-10-30 NOTE — PROGRESS NOTE ADULT - SUBJECTIVE AND OBJECTIVE BOX
INFECTIOUS DISEASES CONSULT FOLLOW-UP NOTE    INTERVAL HPI/OVERNIGHT EVENTS: СЕРГЕЙ overnight, afebrile.     ROS:   Patient denies any pain today, reports significant improvement in lower extremity swelling. Denies any N/V/D/C or abdominal pain. Requests ensures to help supplement his diet as he doesnt feel like eating the hospital food as much.     ANTIBIOTICS/RELEVANT:    MEDICATIONS  (STANDING):  acetaZOLAMIDE  IVPB 500 milliGRAM(s) IV Intermittent once  aspirin  chewable 81 milliGRAM(s) Oral daily  atorvastatin 40 milliGRAM(s) Oral at bedtime  cloNIDine 0.1 milliGRAM(s) Oral two times a day  DAPTOmycin IVPB 500 milliGRAM(s) IV Intermittent every 24 hours  enoxaparin Injectable 30 milliGRAM(s) SubCutaneous every 24 hours  hydrALAZINE 25 milliGRAM(s) Oral three times a day  metoprolol succinate ER 50 milliGRAM(s) Oral every 24 hours  multivitamin 1 Tablet(s) Oral daily  pantoprazole    Tablet 40 milliGRAM(s) Oral before breakfast    MEDICATIONS  (PRN):  acetaminophen     Tablet .. 650 milliGRAM(s) Oral every 6 hours PRN Temp greater or equal to 38C (100.4F), Mild Pain (1 - 3)  ondansetron Injectable 4 milliGRAM(s) IV Push every 8 hours PRN Nausea and/or Vomiting        Vital Signs Last 24 Hrs  T(C): 36.7 (30 Oct 2024 09:51), Max: 36.7 (30 Oct 2024 09:51)  T(F): 98.1 (30 Oct 2024 09:51), Max: 98.1 (30 Oct 2024 09:51)  HR: 74 (30 Oct 2024 13:15) (73 - 78)  BP: 177/85 (30 Oct 2024 13:15) (138/66 - 177/85)  BP(mean): --  RR: 18 (30 Oct 2024 09:51) (16 - 24)  SpO2: 100% (30 Oct 2024 09:51) (93% - 100%)    Parameters below as of 30 Oct 2024 09:51  Patient On (Oxygen Delivery Method): nasal cannula  O2 Flow (L/min): 2      10-29-24 @ 07:01  -  10-30-24 @ 07:00  --------------------------------------------------------  IN: 0 mL / OUT: 775 mL / NET: -775 mL    10-30-24 @ 07:01  -  10-30-24 @ 13:25  --------------------------------------------------------  IN: 0 mL / OUT: 550 mL / NET: -550 mL      PHYSICAL EXAM:  Constitutional: alert, NAD, pleasant and conversant   Eyes: swelling b/l undereyes   ENT: the ears and nose were normal in appearance.   Neck: the appearance of the neck was normal and the neck was supple.   Pulmonary: no respiratory distress   Heart: heart rate was normal and rhythm regular  Vascular: internal improvement in both LE edema and erythema on RLE  Abdomen: soft, non-tender  Psychiatric: the affect was normal        LABS:                        12.2   6.79  )-----------( 94       ( 29 Oct 2024 05:30 )             40.7     10-30    143  |  95[L]  |  33[H]  ----------------------------<  89  4.2   |  40[H]  |  2.06[H]    Ca    8.5      30 Oct 2024 05:30  Phos  4.1     10-29  Mg     1.7     10-29    TPro  6.5  /  Alb  2.7[L]  /  TBili  0.7  /  DBili  x   /  AST  26  /  ALT  14  /  AlkPhos  46  10-30      Urinalysis Basic - ( 30 Oct 2024 05:30 )    Color: x / Appearance: x / SG: x / pH: x  Gluc: 89 mg/dL / Ketone: x  / Bili: x / Urobili: x   Blood: x / Protein: x / Nitrite: x   Leuk Esterase: x / RBC: x / WBC x   Sq Epi: x / Non Sq Epi: x / Bacteria: x        MICROBIOLOGY:      RADIOLOGY & ADDITIONAL STUDIES:  Reviewed INFECTIOUS DISEASES CONSULT FOLLOW-UP NOTE    INTERVAL HPI/OVERNIGHT EVENTS: СЕРГЕЙ overnight, afebrile.     ROS:   Patient denies any pain today, reports ongoing significant improvement in lower extremity swelling. Denies any N/V/D/C or abdominal pain. Requests ensures to help supplement his diet as he doesnt feel like eating the hospital food as much.     ANTIBIOTICS/RELEVANT:    MEDICATIONS  (STANDING):  acetaZOLAMIDE  IVPB 500 milliGRAM(s) IV Intermittent once  aspirin  chewable 81 milliGRAM(s) Oral daily  atorvastatin 40 milliGRAM(s) Oral at bedtime  cloNIDine 0.1 milliGRAM(s) Oral two times a day  DAPTOmycin IVPB 500 milliGRAM(s) IV Intermittent every 24 hours  enoxaparin Injectable 30 milliGRAM(s) SubCutaneous every 24 hours  hydrALAZINE 25 milliGRAM(s) Oral three times a day  metoprolol succinate ER 50 milliGRAM(s) Oral every 24 hours  multivitamin 1 Tablet(s) Oral daily  pantoprazole    Tablet 40 milliGRAM(s) Oral before breakfast    MEDICATIONS  (PRN):  acetaminophen     Tablet .. 650 milliGRAM(s) Oral every 6 hours PRN Temp greater or equal to 38C (100.4F), Mild Pain (1 - 3)  ondansetron Injectable 4 milliGRAM(s) IV Push every 8 hours PRN Nausea and/or Vomiting        Vital Signs Last 24 Hrs  T(C): 36.7 (30 Oct 2024 09:51), Max: 36.7 (30 Oct 2024 09:51)  T(F): 98.1 (30 Oct 2024 09:51), Max: 98.1 (30 Oct 2024 09:51)  HR: 74 (30 Oct 2024 13:15) (73 - 78)  BP: 177/85 (30 Oct 2024 13:15) (138/66 - 177/85)  BP(mean): --  RR: 18 (30 Oct 2024 09:51) (16 - 24)  SpO2: 100% (30 Oct 2024 09:51) (93% - 100%)    Parameters below as of 30 Oct 2024 09:51  Patient On (Oxygen Delivery Method): nasal cannula  O2 Flow (L/min): 2      10-29-24 @ 07:01  -  10-30-24 @ 07:00  --------------------------------------------------------  IN: 0 mL / OUT: 775 mL / NET: -775 mL    10-30-24 @ 07:01  -  10-30-24 @ 13:25  --------------------------------------------------------  IN: 0 mL / OUT: 550 mL / NET: -550 mL      PHYSICAL EXAM:  Constitutional: alert, NAD, pleasant and conversant   Eyes: swelling b/l undereyes   ENT: the ears and nose were normal in appearance.   Neck: the appearance of the neck was normal and the neck was supple.   Pulmonary: no respiratory distress   Heart: heart rate was normal and rhythm regular  Vascular: interval improvement in BLE edema and erythema on RLE  Abdomen: soft, non-tender  Psychiatric: the affect was normal        LABS:                        12.2   6.79  )-----------( 94       ( 29 Oct 2024 05:30 )             40.7     10-30    143  |  95[L]  |  33[H]  ----------------------------<  89  4.2   |  40[H]  |  2.06[H]    Ca    8.5      30 Oct 2024 05:30  Phos  4.1     10-29  Mg     1.7     10-29    TPro  6.5  /  Alb  2.7[L]  /  TBili  0.7  /  DBili  x   /  AST  26  /  ALT  14  /  AlkPhos  46  10-30      Urinalysis Basic - ( 30 Oct 2024 05:30 )    Color: x / Appearance: x / SG: x / pH: x  Gluc: 89 mg/dL / Ketone: x  / Bili: x / Urobili: x   Blood: x / Protein: x / Nitrite: x   Leuk Esterase: x / RBC: x / WBC x   Sq Epi: x / Non Sq Epi: x / Bacteria: x        MICROBIOLOGY:      RADIOLOGY & ADDITIONAL STUDIES:  Reviewed

## 2024-10-30 NOTE — PROGRESS NOTE ADULT - PROBLEM SELECTOR PLAN 12
Fluids: s/p 500cc NS bolus w/ flash pulm edema; caution w/ fluids  Electrolytes: replete as needed  Nutrition: DASH diet  PPI ppx: pantoprazole 40mg QD  Dvt ppx: lovenox 30mg (renally dosed)   Activity: as tolerated, PT and OT  Dispo: PT/OT recommending [facility]

## 2024-10-30 NOTE — PROGRESS NOTE ADULT - PROBLEM SELECTOR PLAN 1
#Severe Sepsis 2/2 DARRIUS LE Cellulitis  Lactate down trending (2.3 to 2.1) s/p fluids. DARRIUS LE pain and worsened b/l LE swelling and erythema, nonpurulent. DARRIUS LE U/S negative for DVTs. Received vanc/zosyn x1 in ED. Patient hospitalized in 09/2021 w/ similar presentation. Initially on cefazolin 1g IV Q12H (renally dosed; D1 10/27), switched to vancomycin given cultures growing staph epidermidis methicillin resistant x2 cultures.  - Abx: vancomycin 1000mg Q24H (d1 10/27)        - s/p cefazolin 1g IV Q12H x1 (renally dosed; D1 10/27)       - f/u vanc trough (10/30 4PM)  - no growth on blood culture 10/28  - ID recs to c/w daptomycin qd for 7d then transition to PO for d/c #Severe Sepsis 2/2 DARRIUS LE Cellulitis  Lactate down trending (2.3 to 2.1) s/p fluids. DARRIUS LE pain and worsened b/l LE swelling and erythema, nonpurulent. DARRIUS LE U/S negative for DVTs. Received vanc/zosyn x1 in ED. Patient hospitalized in 09/2021 w/ similar presentation. Initially on cefazolin 1g IV Q12H (renally dosed; D1 10/27), switched to vancomycin given cultures growing staph epidermidis methicillin resistant x2 cultures.  - Abx: vancomycin 1000mg Q24H (d1 10/27)        - s/p cefazolin 1g IV Q12H x1 (renally dosed; D1 10/27)       - f/u vanc trough (10/30 4PM)  - no growth on blood culture 10/28, f/u blood culture tomorrow (10/31)  - ID recs to c/w daptomycin qd for 7d; if repeat blood cultures show no growth, then transition to PO linezolid for d/c #Severe Sepsis 2/2 DARRIUS LE Cellulitis  Lactate down trending (2.3 to 2.1) s/p fluids. DARRIUS LE pain and worsened b/l LE swelling and erythema, nonpurulent. DARRIUS LE U/S negative for DVTs. Received vanc/zosyn x1 in ED. Patient hospitalized in 09/2021 w/ similar presentation. Initially on cefazolin 1g IV Q12H (renally dosed; D1 10/27), switched to vancomycin given cultures growing staph capitis, haemolyticus, diphteroids, likely contaminants  - no growth on blood culture 10/28  - ID recs to c/w daptomycin qd for 7d; if repeat blood cultures show no growth, then transition to PO linezolid for d/c

## 2024-10-30 NOTE — PROGRESS NOTE ADULT - ATTENDING COMMENTS
Patient is an 85 yo Male with PMHx of CAD with remote PCI, Heart Failure, Stroke (residual RUE weakness), HTN, HLD, CKD (baseline Cr 1.6), GERD, Anxiety/Depression  brought to ED by family for b/l LE swelling, redness, and oozing-he admitted to [email or web address]] for  non-purulent cellulitis with bacteremia found to be in Acute Heart Failure Exacerbation for which Cardiology was consulted    Review of Studies:  - TTE 10/29/24: EF 50%, mild symmetric LVH, mildly reduced LV systolic function, grade I LV diastolic dysfunction, normal RV size and systolic function, severely dilated LA, aortic sclerosis without significant stenosis, trace aortic regurgitation, PaSP 35mmHg, no pericardial effusion  - ECG10/28/24 : NSR w/ LAD, RBBB, and LVH by aVL criteria    Outpatient Providers: PCP Dr. Stone Allen    Home Medications: Metoprolol succinate ER 50mg BID, HCTZ 12.5mg QD, Clonidine 0.1mg BID, Atorvastatin 40mg HS, Fosinopril 40mg QD    # Acute on Chronic Diastolic Heart Failure Exacerbation   # ASCVD: CAD; CVA  - Patient is a poor historian. History if mostly taken from his wife and kids at bedside  - Both report that patient carries a diagnosis of '' a weak heart'' managed by his PCP. They recall Remote stress test and revascularization but are unable to detail patient's cardiac history further  - In the months leading up to hospitalization patient has reported progressive worsening Dyspnea and orthopnea making ADLS difficult. In recent weeks patient has been sleeping with numerous pillows in a seated position due to pronounced orthopnea. In the past week he has had worsening lower extremity edema prompting him to come to the hospital  - Echo performed this hospitalization revealed Grade II Diastolic Dysfunction with a severely dilated LA as well as borderline LV function without significant valvular heart disease  - Clinically patient is warm, well perfused and compensated with JVP around the clavicle, clear lungs and improving pitting lower extremity edema. Patient feels improved overall  - Patient was diuresed sufficiently. He is s/p 40 IVP of lasix today. We will likely transition to Lasix 40 mg PO daily diuretic regimen starting 10/31  - Agree with one time dose of Diamox 500 mg po X1 today  - Cont with Toprol 50 mg po Daily with strict holding parameters  - Should SBP remain > 140/90 persistently, would initiate on Hydralazine 25 mg po BID with strict holding parameters  - Please obtain daily weight. Last Weight documented at 79.8 Kg on 10/27  - From a CAD standpoint please continue with ASA 81mg  and high intensity statin atorvastatin 40mg QD  - Please obtain collateral cardiac hx from PCP (LHC/RHC, TTE, stress tests)  - Cardiology will cont to follow with you, please call with any questions .

## 2024-10-30 NOTE — PROGRESS NOTE ADULT - ATTENDING COMMENTS
I: HTN uncontrolled.   Cr 2. Kidneys enlarged with multiple cysts.   A: Stable KALA. Possible baseline CKD.   P: No indications for dialysis. Continue BP meds. Follow SCr.

## 2024-10-30 NOTE — PROGRESS NOTE ADULT - ASSESSMENT
66 history of  CKD stage III baseline creatinine of 1.68, hypertension, CHF,  hyperlipidemia ,CAD, old stroke admitted for CHF exacerbation with bilateral lower limbs cellulitis, nephrology consulted for KALA on CKD Creat 2.1      Assessment  Patient has improvement in Cr to 2.06, imaging showing bilaterally enlarged kidneys with multiple cysts suggesting polycystic kidney disease. Patient has not followed with outpatient nephrology. Imaging showing no obstruction suggesting likely ATN due to infection, possibly glomerulonephritis however less likely given no RBC casts. Ctm for improvement in KALA      - Strict I/O chart  - Daily I/O chart  - Daily weight  -trend BMP daily  -fluid restriction 1.2 l/d  - Optimize SBP above 110  - Avoid IV contrast  -Cardiology review    Sub-nephrotic range proteinuria likely secondary to KALA ? tubular injury: nephrotic syndrome unlikely  UPCR <3.5  -Pt will benefit from ACE I or ARB after the acute phase  -treatment of underlying course    Pt needs cardiology consult  will follow 66 history of  CKD stage III baseline creatinine of 1.68, hypertension, CHF,  hyperlipidemia ,CAD, old stroke admitted for CHF exacerbation with bilateral lower limbs cellulitis, nephrology consulted for KALA on CKD Creat 2.1      Assessment  Patient has improvement in Cr to 2.06, imaging showing bilaterally enlarged kidneys with multiple cysts suggesting polycystic kidney disease. Patient has not followed with outpatient nephrology. Imaging showing no obstruction suggesting likely ATN due to infection. Ctm for improvement in KALA      - Strict I/O chart  - Daily I/O chart  - Daily weight  -trend BMP daily  - Optimize SBP above 110  - Avoid IV contrast  -Cardiology review    Sub-nephrotic range proteinuria likely secondary to KALA ? tubular injury: nephrotic syndrome unlikely  UPCR <3.5  -Pt will benefit from ACE I or ARB after the acute phase  -treatment of underlying course    Pt needs cardiology consult  will follow

## 2024-10-30 NOTE — PROGRESS NOTE ADULT - PROBLEM SELECTOR PLAN 4
#ATN on CKD likely due to infection.   Pt w/ hx of chronic renal insufficiency (most recent creatinine 1.68 2024). Cr 2.12 on this admission (peaked on admission). Creatinine clearance 28 on admission. . FeUrea 33.7% suggestive of pre-renal etiology. Renal ultrasound showing no hydronephrosis or obstruction. Likely ATN on CKD due to infection.   - Caution w/ fluids given likely flash pulmonary edema s/p 500cc  - nephrology recs: strict Is & Os, treat w/ ACEi or ARB after acute phase & tx underlying course       - fluid restriction 1.2 L/D  - renally dose medication:       - lovenox 30mg for DVT ppx       - holding home gabapentin 600mg until renal improvement

## 2024-10-30 NOTE — PROGRESS NOTE ADULT - PROBLEM SELECTOR PLAN 2
Patient showing improvement in hypoxemia. In the setting of b/l LE edema and a likely hx of CHF. Patient also reports 70 pack years of smoking. No wheezes or crackles on exam.   - now resolving   - c/w diuresis below  - strict I/Os   - Wean O2 as tolerated (currently 95% o2 sat on 2L NC)

## 2024-10-30 NOTE — PROGRESS NOTE ADULT - ATTENDING COMMENTS
84M with hx of CAD s/p PCI, CHF, CVA, CKD, HTN, HLD, and mood disorder who was admitted on 10/27 after presented with R > L leg swelling and redness x ~2-3 weeks, without any systemic symptoms of infection. He has been afebrile since arrival, WBC initially 14k now normalized, BCx from admission with S.haemolyticus and C.aurimucosum in one set, S.haemolyticus and S.capitis in the other set, and biofire with MRSE. Repeat BCx from 10/28 ngtd (after vancomycin). He denies any endovascular prosthetic material (no arterial grafts, no cardiac devices, no prosthetic valves). Only surgery was a hernia repair ~2 years ago, and no issues in that area. On exam patient has very mild RLE cellulitis associated with a skin tear of anterior shin - nearly resolved now after 3 days of vancomycin -> daptomycin. He has stasis dermatitis of LLE. Favor that these positive BCx results are contaminant, but since S.haemolyticus is in both sets and his RLE skin tear/cellulitis is possible source, will treat for cellulitis, and possible S.haemolyticus bacteremia with daptomycin 500mg IV q24h while inpatient, with transition to linezolid 600mg PO BID upon discharge, to complete 7 day total course (EOT 11/3).

## 2024-10-30 NOTE — PROGRESS NOTE ADULT - PROBLEM SELECTOR PLAN 11
On previous admission patient was noted to have PEDRITO. Patient reports that he does not use a CPAP machine at home but he is amendable to trying it.   - c/w CPAP overnight

## 2024-10-30 NOTE — PROGRESS NOTE ADULT - ASSESSMENT
84M w/ PMH of HTN, HLD, CAD, CHF, CKD, GERD, anxiety/depression, brought to ED by family, for 1w history of leg swelling, redness and blisters found to have cellulitis, ED course c/b respiratory distress after IV fluids thought to be HF exacerbation improving after IV lasix. ID consulted as patient noted to have gram positive cocci in clusters in both sets of blood cultures (4/4 culture bottles); methicillin resistant staph epidermidis identified in aerobic bottle (1/4).    #GPC in BCx  Patient noted to have gram positive cocci in clusters in both sets of blood cultures (4/4 culture bottles); methicillin resistant staph epidermidis identified in aerobic bottle (1/4). Noted to have right sided lower extremity cellulitis and b/l dermatitis in the setting of fluid retention from CHF. Unlikely systemic bacteremia from clean lesion noted on RLE. Today both LE edema and erythema appear improved.   - c/w daptomycin 500 mg QD  - follow repeat blood cultures if continue to be negative can transition to PO linezolid for a total of 7 day course of antibiotics.     ID Team 1 will ID sign off. Recommendations final pending attending attestation.          84M w/ PMH of HTN, HLD, CAD, CHF, CKD, GERD, anxiety/depression, brought to ED by family, for 1w history of leg swelling, redness and blisters found to have cellulitis, ED course c/b respiratory distress after IV fluids thought to be HF exacerbation improving after IV lasix. ID consulted as patient noted to have gram positive cocci in clusters in both sets of blood cultures (4/4 culture bottles); methicillin resistant staph epidermidis identified in aerobic bottle (1/4).    #GPC in BCx  Patient noted to have gram positive cocci in clusters in both sets of blood cultures (4/4 culture bottles); methicillin resistant staph epidermidis identified in aerobic bottle (1/4). Noted to have right sided lower extremity cellulitis and b/l dermatitis in the setting of fluid retention from CHF. Unlikely systemic bacteremia from clean lesion noted on RLE. Today both LE edema and erythema appear improved.   - c/w daptomycin 500 mg QD  - follow repeat blood cultures if continue to be negative can transition to PO linezolid upon discharge for a total of 7 day course of antibiotics.     ID Team 1 will ID sign off. Please call if further ID input is desired. Recommendations final pending attending attestation.

## 2024-10-30 NOTE — PROGRESS NOTE ADULT - SUBJECTIVE AND OBJECTIVE BOX
VERN CRISTOBAL  84y  Male      Patient is a 84y old  Male who presents with a chief complaint of Leg Swelling (29 Oct 2024 17:05)        Notable Events:      REVIEW OF SYSTEMS:  CONSTITUTIONAL: No fever  EYES: No visual disturbances  ENMT: No hearing changes, No sore throat  RESPIRATORY: No cough, No shortness of breath  CARDIOVASCULAR: No chest pain, No palpitations  GASTROINTESTINAL: No abdominal pain, No nausea, No vomiting, No diarrhea, No melena, No hematochezia.  GENITOURINARY: No dysuria, frequency, hematuria, or incontinence  NEUROLOGICAL: No headaches, No weakness, No numbness, No tremors  SKIN: No lesions, No rashes  MUSCULOSKELETAL: No joint pain, No backpain, No extremity pain  PSYCHIATRIC: No depression, anxiety, or difficulty sleeping  FAMILY HISTORY:  No pertinent family history in first degree relatives      Vital Signs Last 24 Hrs  T(C): 36.5 (30 Oct 2024 05:15), Max: 37 (29 Oct 2024 09:38)  T(F): 97.7 (30 Oct 2024 05:15), Max: 98.6 (29 Oct 2024 09:38)  HR: 74 (30 Oct 2024 06:01) (70 - 78)  BP: 162/87 (30 Oct 2024 05:15) (133/76 - 162/87)  BP(mean): --  RR: 18 (30 Oct 2024 06:01) (17 - 24)  SpO2: 97% (30 Oct 2024 06:01) (93% - 100%)    Parameters below as of 30 Oct 2024 06:01  Patient On (Oxygen Delivery Method): nasal cannula  O2 Flow (L/min): 2    No Known Allergies      PHYSICAL EXAM:  GENERAL: No acute distress  HEAD:  Atraumatic, Normocephalic  EYES: Normal extraocular movements. Conjunctiva and sclera are normal  NECK: Supple. Normal thyroid. No lymphadenopathy  NERVOUS SYSTEM:  Alert & Oriented X3. Motor Strength 5/5 B/L upper and lower extremities; DTRs 2+ intact and symmetric.  CHEST/LUNG: No wheezing or crackles present.  CARDIAC: Regular rate and rhythm. No murmurs or rubs. Jugular venous pressure is normal.  ABDOMEN: Nontender. Nondistended. Soft abdomen.  EXTREMITIES:  2+ Peripheral Pulses, No clubbing, cyanosis, or edema.  SKIN: No rashes or lesions    Consultant(s) Notes Reviewed:  [x ] YES  [ ] NO  Care Discussed with Consultants/Other Providers [ x] YES  [ ] NO    LABS:                        12.2   6.79  )-----------( 94       ( 29 Oct 2024 05:30 )             40.7     10-29    143  |  98  |  37[H]  ----------------------------<  94  4.5   |  38[H]  |  2.07[H]    Ca    8.5      29 Oct 2024 05:30  Phos  4.1     10-29  Mg     1.7     10-29            Urinalysis Basic - ( 29 Oct 2024 05:30 )    Color: x / Appearance: x / SG: x / pH: x  Gluc: 94 mg/dL / Ketone: x  / Bili: x / Urobili: x   Blood: x / Protein: x / Nitrite: x   Leuk Esterase: x / RBC: x / WBC x   Sq Epi: x / Non Sq Epi: x / Bacteria: x        Culture - Blood (collected 28 Oct 2024 16:40)  Source: .Blood BLOOD  Preliminary Report (30 Oct 2024 01:01):    No growth at 24 hours    Urinalysis with Rflx Culture (collected 27 Oct 2024 17:00)    Culture - Blood (collected 27 Oct 2024 12:25)  Source: .Blood BLOOD  Gram Stain (28 Oct 2024 13:32):    Growth in aerobic bottle: Gram Positive Cocci in Clusters  Preliminary Report (29 Oct 2024 11:29):    Growth in aerobic bottle: Staphylococcus haemolyticus    Isolation of Coagulase negative Staphylococcus from single blood culture    sets may represent    contamination. Contact the Microbiology Department at [phone] if    susceptibility testing is needed.    clinically indicated.    Direct identification is available within approximately 3-5    hours either by Blood Panel Multiplexed PCR or Direct    MALDI-TOF. Details: [email or web address]  Organism: Blood Culture PCR (28 Oct 2024 14:51)  Organism: Blood Culture PCR (28 Oct 2024 14:51)    Culture - Blood (collected 27 Oct 2024 12:15)  Source: .Blood BLOOD  Gram Stain (28 Oct 2024 22:57):    Growth in aerobic bottle: Gram Positive Cocci in Clusters    Growth in anaerobic bottle: Gram Positive Cocci in Clusters  Preliminary Report (29 Oct 2024 15:21):    Growth in aerobic and anaerobic bottles: Staphylococcus haemolyticus        RADIOLOGY & ADDITIONAL TESTS:    Imaging Personally Reviewed:  [x] YES  [ ] NO  acetaminophen     Tablet .. 650 milliGRAM(s) Oral every 6 hours PRN  aspirin  chewable 81 milliGRAM(s) Oral daily  atorvastatin 40 milliGRAM(s) Oral at bedtime  cloNIDine 0.1 milliGRAM(s) Oral two times a day  DAPTOmycin IVPB 500 milliGRAM(s) IV Intermittent every 24 hours  enoxaparin Injectable 30 milliGRAM(s) SubCutaneous every 24 hours  metoprolol succinate ER 50 milliGRAM(s) Oral every 24 hours  multivitamin 1 Tablet(s) Oral daily  ondansetron Injectable 4 milliGRAM(s) IV Push every 8 hours PRN  pantoprazole    Tablet 40 milliGRAM(s) Oral before breakfast      HEALTH ISSUES - PROBLEM Dx:  Acute hypoxic respiratory failure    Acute CHF    KALA (acute kidney injury)    Prophylactic measure    GERD (gastroesophageal reflux disease)    HTN (hypertension)    HLD (hyperlipidemia)    Anxiety and depression    CAD (coronary artery disease)    T wave inversion in EKG    Severe sepsis    PEDRITO on CPAP    Bilateral cellulitis of lower leg           CRISTOBAL PORRAS is doing well this morning. Today is hospital day 3. Pt reported using CPAP last night. No acute complaints. Denies fever, SOB, abd pain and chest pain.         Notable Events: no acute overnight events.       REVIEW OF SYSTEMS:  CONSTITUTIONAL: No fever  EYES: No visual disturbances  ENMT: No hearing changes, No sore throat  RESPIRATORY: No cough, No shortness of breath  CARDIOVASCULAR: No chest pain, No palpitations  GASTROINTESTINAL: No abdominal pain, No nausea, No vomiting, No diarrhea, No melena, No hematochezia.  GENITOURINARY: No dysuria, frequency, hematuria, or incontinence  NEUROLOGICAL: No headaches, No weakness, No numbness, No tremors  SKIN: No lesions, No rashes  MUSCULOSKELETAL: No joint pain, No backpain, No extremity pain  PSYCHIATRIC: No depression, anxiety, or difficulty sleeping  FAMILY HISTORY:  No pertinent family history in first degree relatives      Vital Signs Last 24 Hrs  T(C): 36.5 (30 Oct 2024 05:15), Max: 37 (29 Oct 2024 09:38)  T(F): 97.7 (30 Oct 2024 05:15), Max: 98.6 (29 Oct 2024 09:38)  HR: 74 (30 Oct 2024 06:01) (70 - 78)  BP: 162/87 (30 Oct 2024 05:15) (133/76 - 162/87)  BP(mean): --  RR: 18 (30 Oct 2024 06:01) (17 - 24)  SpO2: 97% (30 Oct 2024 06:01) (93% - 100%)    Parameters below as of 30 Oct 2024 06:01  Patient On (Oxygen Delivery Method): nasal cannula  O2 Flow (L/min): 2    No Known Allergies      PHYSICAL EXAM:  GENERAL: well-appearing, mumbled speech & in no acute distress  HEAD:  Atraumatic, Normocephalic  EYES: Normal extraocular movements. Conjunctiva and sclera are normal  NECK: Supple. Normal thyroid. No lymphadenopathy  NERVOUS SYSTEM:  Alert & Oriented X3. Motor Strength 5/5 on LUE, 2/5 on RUE to abduction and flexion, 5/5 on lower extremities b/l; DTRs 2+ intact and symmetric.  CHEST/LUNG: No wheezing or crackles present.  CARDIAC: Regular rate and rhythm. No murmurs or rubs. Jugular venous pressure is normal.  ABDOMEN: Nontender. Nondistended. Soft abdomen.  EXTREMITIES:  b/l 2+ LE edema w/ erythema and hyperpigmented discoloration up to mid-shin, ttp, ulcerations on R lateral leg, no purulent drainage noted, distal pulses1+ at DP b/l   SKIN: No rashes or lesions    Consultant(s) Notes Reviewed:  [x ] YES  [ ] NO  Care Discussed with Consultants/Other Providers [ x] YES  [ ] NO    LABS:                        12.2   6.79  )-----------( 94       ( 29 Oct 2024 05:30 )             40.7     10-29    143  |  98  |  37[H]  ----------------------------<  94  4.5   |  38[H]  |  2.07[H]    Ca    8.5      29 Oct 2024 05:30  Phos  4.1     10-29  Mg     1.7     10-29            Urinalysis Basic - ( 29 Oct 2024 05:30 )    Color: x / Appearance: x / SG: x / pH: x  Gluc: 94 mg/dL / Ketone: x  / Bili: x / Urobili: x   Blood: x / Protein: x / Nitrite: x   Leuk Esterase: x / RBC: x / WBC x   Sq Epi: x / Non Sq Epi: x / Bacteria: x        Culture - Blood (collected 28 Oct 2024 16:40)  Source: .Blood BLOOD  Preliminary Report (30 Oct 2024 01:01):    No growth at 24 hours    Urinalysis with Rflx Culture (collected 27 Oct 2024 17:00)    Culture - Blood (collected 27 Oct 2024 12:25)  Source: .Blood BLOOD  Gram Stain (28 Oct 2024 13:32):    Growth in aerobic bottle: Gram Positive Cocci in Clusters  Preliminary Report (29 Oct 2024 11:29):    Growth in aerobic bottle: Staphylococcus haemolyticus    Isolation of Coagulase negative Staphylococcus from single blood culture    sets may represent    contamination. Contact the Microbiology Department at [phone] if    susceptibility testing is needed.    clinically indicated.    Direct identification is available within approximately 3-5    hours either by Blood Panel Multiplexed PCR or Direct    MALDI-TOF. Details: [email or web address]  Organism: Blood Culture PCR (28 Oct 2024 14:51)  Organism: Blood Culture PCR (28 Oct 2024 14:51)    Culture - Blood (collected 27 Oct 2024 12:15)  Source: .Blood BLOOD  Gram Stain (28 Oct 2024 22:57):    Growth in aerobic bottle: Gram Positive Cocci in Clusters    Growth in anaerobic bottle: Gram Positive Cocci in Clusters  Preliminary Report (29 Oct 2024 15:21):    Growth in aerobic and anaerobic bottles: Staphylococcus haemolyticus        RADIOLOGY & ADDITIONAL TESTS:    Imaging Personally Reviewed:  [x] YES  [ ] NO  acetaminophen     Tablet .. 650 milliGRAM(s) Oral every 6 hours PRN  aspirin  chewable 81 milliGRAM(s) Oral daily  atorvastatin 40 milliGRAM(s) Oral at bedtime  cloNIDine 0.1 milliGRAM(s) Oral two times a day  DAPTOmycin IVPB 500 milliGRAM(s) IV Intermittent every 24 hours  enoxaparin Injectable 30 milliGRAM(s) SubCutaneous every 24 hours  metoprolol succinate ER 50 milliGRAM(s) Oral every 24 hours  multivitamin 1 Tablet(s) Oral daily  ondansetron Injectable 4 milliGRAM(s) IV Push every 8 hours PRN  pantoprazole    Tablet 40 milliGRAM(s) Oral before breakfast      HEALTH ISSUES - PROBLEM Dx:  Acute hypoxic respiratory failure    Acute CHF    KALA (acute kidney injury)    Prophylactic measure    GERD (gastroesophageal reflux disease)    HTN (hypertension)    HLD (hyperlipidemia)    Anxiety and depression    CAD (coronary artery disease)    T wave inversion in EKG    Severe sepsis    PEDRITO on CPAP    Bilateral cellulitis of lower leg           CRISTOBAL PORRAS is doing well this morning. Today is hospital day 3. Pt reported using CPAP last night. No acute complaints. Denies fever, SOB, abd pain and chest pain.         Notable Events: no acute overnight events.       REVIEW OF SYSTEMS:  CONSTITUTIONAL: No fever  EYES: No visual disturbances  ENMT: No hearing changes, No sore throat  RESPIRATORY: No cough, No shortness of breath  CARDIOVASCULAR: No chest pain, No palpitations  GASTROINTESTINAL: No abdominal pain, No nausea, No vomiting, No diarrhea, No melena, No hematochezia.  GENITOURINARY: No dysuria, frequency, hematuria, or incontinence  NEUROLOGICAL: No headaches, No weakness, No numbness, No tremors  SKIN: No lesions, No rashes  MUSCULOSKELETAL: No joint pain, No backpain, No extremity pain  PSYCHIATRIC: No depression, anxiety, or difficulty sleeping  FAMILY HISTORY:  No pertinent family history in first degree relatives      Vital Signs Last 24 Hrs  T(C): 36.5 (30 Oct 2024 05:15), Max: 37 (29 Oct 2024 09:38)  T(F): 97.7 (30 Oct 2024 05:15), Max: 98.6 (29 Oct 2024 09:38)  HR: 74 (30 Oct 2024 06:01) (70 - 78)  BP: 162/87 (30 Oct 2024 05:15) (133/76 - 162/87)  BP(mean): --  RR: 18 (30 Oct 2024 06:01) (17 - 24)  SpO2: 97% (30 Oct 2024 06:01) (93% - 100%)    Parameters below as of 30 Oct 2024 06:01  Patient On (Oxygen Delivery Method): nasal cannula  O2 Flow (L/min): 2    No Known Allergies      PHYSICAL EXAM:  GENERAL: well-appearing, mumbled speech & in no acute distress  HEAD:  Atraumatic, Normocephalic  EYES: Normal extraocular movements. Conjunctiva and sclera are normal  NECK: Supple. Normal thyroid. No lymphadenopathy  NERVOUS SYSTEM:  Alert & Oriented X3. Motor Strength 5/5 on LUE, 2/5 on RUE to abduction and flexion, 5/5 on lower extremities b/l; DTRs 2+ intact and symmetric.  CHEST/LUNG: No wheezing or crackles present.  CARDIAC: Regular rate and rhythm. No murmurs or rubs. Jugular venous pressure is normal.  ABDOMEN: Nontender. Nondistended. Soft abdomen.  EXTREMITIES:  b/l 2+ LE edema w/ erythema and hyperpigmented discoloration up to mid-shin, ttp, ulcerations on R lateral leg, no purulent drainage noted, distal pulses1+ at DP b/l   SKIN: No rashes or lesions    Consultant(s) Notes Reviewed:  [x ] YES  [ ] NO  Care Discussed with Consultants/Other Providers [ x] YES  [ ] NO    LABS:                        12.2   6.79  )-----------( 94       ( 29 Oct 2024 05:30 )             40.7     10-29    143  |  98  |  37[H]  ----------------------------<  94  4.5   |  38[H]  |  2.07[H]    Ca    8.5      29 Oct 2024 05:30  Phos  4.1     10-29  Mg     1.7     10-29            Urinalysis Basic - ( 29 Oct 2024 05:30 )    Color: x / Appearance: x / SG: x / pH: x  Gluc: 94 mg/dL / Ketone: x  / Bili: x / Urobili: x   Blood: x / Protein: x / Nitrite: x   Leuk Esterase: x / RBC: x / WBC x   Sq Epi: x / Non Sq Epi: x / Bacteria: x        Culture - Blood (collected 28 Oct 2024 16:40)  Source: .Blood BLOOD  Preliminary Report (30 Oct 2024 01:01):    No growth at 24 hours    Urinalysis with Rflx Culture (collected 27 Oct 2024 17:00)    Culture - Blood (collected 27 Oct 2024 12:25)  Source: .Blood BLOOD  Gram Stain (28 Oct 2024 13:32):    Growth in aerobic bottle: Gram Positive Cocci in Clusters  Preliminary Report (29 Oct 2024 11:29):    Growth in aerobic bottle: Staphylococcus haemolyticus    Isolation of Coagulase negative Staphylococcus from single blood culture    sets may represent    contamination. Contact the Microbiology Department at [phone] if    susceptibility testing is needed.    clinically indicated.    Direct identification is available within approximately 3-5    hours either by Blood Panel Multiplexed PCR or Direct    MALDI-TOF. Details: [email or web address]  Organism: Blood Culture PCR (28 Oct 2024 14:51)  Organism: Blood Culture PCR (28 Oct 2024 14:51)    Culture - Blood (collected 27 Oct 2024 12:15)  Source: .Blood BLOOD  Gram Stain (28 Oct 2024 22:57):    Growth in aerobic bottle: Gram Positive Cocci in Clusters    Growth in anaerobic bottle: Gram Positive Cocci in Clusters  Preliminary Report (29 Oct 2024 15:21):    Growth in aerobic and anaerobic bottles: Staphylococcus haemolyticus        RADIOLOGY & ADDITIONAL TESTS:    Imaging Personally Reviewed:  [x] YES  [ ] NO  acetaminophen     Tablet .. 650 milliGRAM(s) Oral every 6 hours PRN  aspirin  chewable 81 milliGRAM(s) Oral daily  atorvastatin 40 milliGRAM(s) Oral at bedtime  cloNIDine 0.1 milliGRAM(s) Oral two times a day  DAPTOmycin IVPB 500 milliGRAM(s) IV Intermittent every 24 hours  enoxaparin Injectable 30 milliGRAM(s) SubCutaneous every 24 hours  metoprolol succinate ER 50 milliGRAM(s) Oral every 24 hours  multivitamin 1 Tablet(s) Oral daily  ondansetron Injectable 4 milliGRAM(s) IV Push every 8 hours PRN  pantoprazole    Tablet 40 milliGRAM(s) Oral before breakfast      HEALTH ISSUES - PROBLEM Dx:  Acute hypoxic respiratory failure    Acute CHF    KALA (acute kidney injury)    Prophylactic measure    GERD (gastroesophageal reflux disease)    HTN (hypertension)    HLD (hyperlipidemia)    Anxiety and depression    CAD (coronary artery disease)    T wave inversion in EKG    Severe sepsis    PEDRITO on CPAP    Bilateral cellulitis of lower leg

## 2024-10-30 NOTE — PROGRESS NOTE ADULT - ASSESSMENT
84M with PMHx of HTN, HLD, CAD (last PCI 18y ago), HFpEF, CKD (baseline Cr 1.6), GERD, stroke (residual RUE weakness), anxiety/depression who was brought to ED by family for b/l LE swelling, redness, and oozing-he was admitted to [facility] for bilateral lower extremity swelling and non-purulent cellulitis. He was found to be in clinical acute decompensated HF pending TTE for which cardiology was consulted for. Since beginning higher dosage IV diuresis yesterday, he has responded appropriately with correlating improvement in volume status on physical exam.     Review of Studies:  10/29/24 Echo: EF 50%, mild symmetric LVH, mildly reduced LV systolic function, grade I LV diastolic dysfunction, normal RV size and systolic function, severely dilated LA, aortic sclerosis without significant stenosis, trace aortic regurgitation, PaSP 35mmHg, no pericardial effusion  10/28/24 ECG: NSR w/ LAD, RBBB, and LVH by aVL criteria    Outpatient Providers: PCP Dr. Stone Allen    Home Medications: Metoprolol succinate ER 50mg BID, HCTZ 12.5mg QD, Clonidine 0.1mg BID, Atorvastatin 40mg HS, Fosinopril 40mg QD    Recommendations:    #ADHF  #HFpEF  Etiology: ?IHD  Rhythm: SR  Hemodynamics: normotensive   Perfusion: WWP making adequate urine  GDMT:   Please c/w Toprol 50mg XL QD holding for HR<60 and/or SBP <110  Continue to hold vasodilators given KALA  Diuretics: Decreased LE swelling, mildly overloaded on exam, , strict I/Os, daily standing weights, and 1.2L fluid restriction  Net negative goal 2L  Device: n/a  AT: n/a    #?Chronic Coronary Disease  -C/w ASA 81mg QD  -C/w atorvastatin 40mg QD  -Please obtain collateral cardiac hx from PCP (LHC/RHC, TTE, stress tests)    #HTN  -c/w hold ACEI in the setting of KALA  -If persistently hypertensive (SBP above 140s), can start hydralazine 25mg TID holding for SBP <110    #HLD  -LDL 30 (at goal)  -C/w Atorvastatin 40mg QD    Recommendations above are preliminary pending discussion with an attending cardiologist  Plan was discussed with primary team  We'll continue to follow, thank you for the consultation      Tino Castro PGY6 CVD Fellow  Cardiology Consult Pager: [phone]  CCU Pager: [phone]  Heart Failure Pager: [phone]       84M with PMHx of HTN, HLD, CAD (last PCI 18y ago), HFpEF, CKD (baseline Cr 1.6), GERD, stroke (residual RUE weakness), anxiety/depression who was brought to ED by family for b/l LE swelling, redness, and oozing-he was admitted to [facility] for bilateral lower extremity swelling and non-purulent cellulitis. He was found to be in clinical acute decompensated HF pending TTE for which cardiology was consulted for. Since beginning higher dosage IV diuresis 2 days ago, he has responded appropriately with correlating improvement in volume status on physical exam.     Review of Studies:  10/29/24 Echo: EF 50%, mild symmetric LVH, mildly reduced LV systolic function, grade II LV diastolic dysfunction, normal RV size and systolic function, severely dilated LA, aortic sclerosis without significant stenosis, trace aortic regurgitation, PaSP 35mmHg, no pericardial effusion  10/28/24 ECG: NSR w/ LAD, RBBB, and LVH by aVL criteria    Outpatient Providers: PCP Dr. Stone Allen    Home Medications: Metoprolol succinate ER 50mg BID, HCTZ 12.5mg QD, Clonidine 0.1mg BID, Atorvastatin 40mg HS, Fosinopril 40mg QD    Recommendations:    #ADHF  #HFpEF  Etiology: ?IHD  Rhythm: SR  Hemodynamics: normotensive   Perfusion: WWP making adequate urine  GDMT:   Please c/w Toprol 50mg XL QD holding for HR<60 and/or SBP <110  Diuretics: Decreased LE swelling, mildly overloaded on exam. Please transition to PO lasix 40mg QD starting 10/31/24 and c/w strict I/Os, daily standing weights (last documented at 79.8 Kg on 10/27), and 1.2L fluid restriction  Agree with one time dose of Diamox 500mg PO given increased bicarbonate  Net negative goal 2L  Device: n/a  AT: n/a    #?Chronic Coronary Disease  -C/w ASA 81mg QD  -C/w atorvastatin 40mg QD  -Please obtain collateral cardiac hx from PCP (LHC/RHC, TTE, stress tests)    #HTN  -c/w hold ACEI in the setting of KALA  -Please start hydralazine 25mg TID holding for SBP <110, as patient has been persistently hypertensive >140/90     #HLD  -LDL 30 (at goal)  -C/w Atorvastatin 40mg QD    Recommendations above are preliminary pending discussion with an attending cardiologist  Plan was discussed with primary team  We'll continue to follow, thank you for the consultation      Tino Castro PGY6 CVD Fellow  Cardiology Consult Pager: [phone]  CCU Pager: [phone]  Heart Failure Pager: [phone]       84M with PMHx of HTN, HLD, CAD (last PCI 18y ago), HFpEF, CKD (baseline Cr 1.6), GERD, stroke (residual RUE weakness), anxiety/depression who was brought to ED by family for b/l LE swelling, redness, and oozing-he was admitted to [facility] for bilateral lower extremity swelling and non-purulent cellulitis. He was found to be in clinical acute decompensated HF pending TTE for which cardiology was consulted for. Since beginning higher dosage IV diuresis 2 days ago, he has responded appropriately with correlating improvement in volume status on physical exam.     Review of Studies:  10/29/24 Echo: EF 50%, mild symmetric LVH, mildly reduced LV systolic function, grade II LV diastolic dysfunction, normal RV size and systolic function, severely dilated LA, aortic sclerosis without significant stenosis, trace aortic regurgitation, PaSP 35mmHg, no pericardial effusion  10/28/24 ECG: NSR w/ LAD, RBBB, and LVH by aVL criteria    Outpatient Providers: PCP Dr. Stone Allen    Home Medications: Metoprolol succinate ER 50mg BID, HCTZ 12.5mg QD, Clonidine 0.1mg BID, Atorvastatin 40mg HS, Fosinopril 40mg QD    Recommendations:    #ADHF  #HFpEF  Etiology: ?IHD  Rhythm: SR  Hemodynamics: normotensive   Perfusion: WWP making adequate urine  GDMT:   Please c/w Toprol 50mg XL QD holding for HR<60 and/or SBP <110  Holding additional GDMT given KALA on CKD  Diuretics: Decreased LE swelling, mildly overloaded on exam. Please transition to PO lasix 40mg QD starting 10/31/24 and c/w strict I/Os, daily standing weights (last documented at 79.8 Kg on 10/27), and 1.2L fluid restriction  Agree with one time dose of Diamox 500mg PO given increased bicarbonate to aid with diuresis   Net negative goal 2L  Device: n/a  AT: n/a    #?Chronic Coronary Disease  -C/w ASA 81mg QD  -C/w atorvastatin 40mg QD  -Please obtain collateral cardiac hx from PCP (LHC/RHC, TTE, stress tests)    #HTN  -c/w hold ACEI in the setting of KALA  -Please start hydralazine 25mg TID holding for SBP <110, as patient has been persistently hypertensive >140/90     #HLD  -LDL 30 (at goal)  -C/w Atorvastatin 40mg QD    Recommendations above are preliminary pending discussion with an attending cardiologist  Plan was discussed with primary team  We'll continue to follow, thank you for the consultation      Tino Castro PGY6 CVD Fellow  Cardiology Consult Pager: [phone]  CCU Pager: [phone]  Heart Failure Pager: [phone]       84M with PMHx of HTN, HLD, CAD (last PCI 18y ago), HFpEF, CKD (baseline Cr 1.6), GERD, stroke (residual RUE weakness), anxiety/depression who was brought to ED by family for b/l LE swelling, redness, and oozing-he was admitted to [facility] for bilateral lower extremity swelling and non-purulent cellulitis. He was found to be in clinical acute decompensated HF pending TTE for which cardiology was consulted for. Since beginning higher dosage IV diuresis 2 days ago, he has responded appropriately with correlating improvement in volume status on physical exam.     Review of Studies:  10/29/24 Echo: EF 50%, mild symmetric LVH, mildly reduced LV systolic function, grade II LV diastolic dysfunction, normal RV size and systolic function, severely dilated LA, aortic sclerosis without significant stenosis, trace aortic regurgitation, PaSP 35mmHg, no pericardial effusion  10/28/24 ECG: NSR w/ LAD, RBBB, and LVH by aVL criteria    Outpatient Providers: PCP Dr. Stone Allen, Cardiologist - Dr. Kwesi Denny [phone]    Home Medications: atorvastatin 40mg QD, clonidine 0.1mg BID, doxepin 10mg x2 QD, famotidine 20mg BID, fosinopril 40mg QD, HCTZ 12.5mg, metoprolol 50mg BID, ASA 81mg QD    Recommendations:    #ADHF  #HFpEF  Etiology: ?IHD  Rhythm: SR  Hemodynamics: normotensive   Perfusion: WWP making adequate urine  GDMT:   Please c/w Toprol 50mg XL QD holding for HR<60 and/or SBP <110  Holding additional GDMT given KALA on CKD  Diuretics: Decreased LE swelling, mildly overloaded on exam. Please transition to PO lasix 40mg QD starting 10/31/24 and c/w strict I/Os, daily standing weights (last documented at 79.8 Kg on 10/27), and 1.2L fluid restriction  Agree with one time dose of Diamox 500mg PO given increased bicarbonate to aid with diuresis   Net negative goal 2L  Device: n/a  AT: n/a    #?Chronic Coronary Disease  -C/w ASA 81mg QD  -C/w atorvastatin 40mg QD  -Please obtain collateral cardiac hx from pt's cardiologist (LHC/RHC, TTE, stress tests)    #HTN  -c/w hold ACEI in the setting of KALA  -Please start hydralazine 25mg TID holding for SBP <110, as patient has been persistently hypertensive >140/90     #HLD  -LDL 30 (at goal)  -C/w Atorvastatin 40mg QD    Recommendations above are preliminary pending discussion with an attending cardiologist  Plan was discussed with primary team  We'll continue to follow, thank you for the consultation      Tino Castro PGY6 CVD Fellow  Cardiology Consult Pager: [phone]  CCU Pager: [phone]  Heart Failure Pager: [phone]

## 2024-10-30 NOTE — PROGRESS NOTE ADULT - SUBJECTIVE AND OBJECTIVE BOX
Nephrology progress note    Subjective  Patient examined at bedside, resting comfortably in no distress. Patient reports currently feeling no pain. Patient has been able to eat and drink and is producing urine. Patient has not followed with nephrology outpatient in many years. Denies fever, chills, N/V, dysuria.     Allergies:  No Known Allergies    Hospital Medications:   MEDICATIONS  (STANDING):  acetaZOLAMIDE  IVPB 500 milliGRAM(s) IV Intermittent once  aspirin  chewable 81 milliGRAM(s) Oral daily  atorvastatin 40 milliGRAM(s) Oral at bedtime  cloNIDine 0.1 milliGRAM(s) Oral two times a day  DAPTOmycin IVPB 500 milliGRAM(s) IV Intermittent every 24 hours  enoxaparin Injectable 30 milliGRAM(s) SubCutaneous every 24 hours  hydrALAZINE 25 milliGRAM(s) Oral three times a day  metoprolol succinate ER 50 milliGRAM(s) Oral every 24 hours  multivitamin 1 Tablet(s) Oral daily  pantoprazole    Tablet 40 milliGRAM(s) Oral before breakfast    REVIEW OF SYSTEMS:  All other review of systems is negative unless indicated above.    VITALS:  T(F): 98.1 (10-30-24 @ 09:51), Max: 98.1 (10-30-24 @ 09:51)  HR: 74 (10-30-24 @ 13:15)  BP: 177/85 (10-30-24 @ 13:15)  RR: 18 (10-30-24 @ 09:51)  SpO2: 100% (10-30-24 @ 09:51)  Wt(kg): --    10-28 @ 07:01  -  10-29 @ 07:00  --------------------------------------------------------  IN: 0 mL / OUT: 1125 mL / NET: -1125 mL    10-29 @ 07:01  -  10-30 @ 07:00  --------------------------------------------------------  IN: 0 mL / OUT: 775 mL / NET: -775 mL    10-30 @ 07:01  -  10-30 @ 13:41  --------------------------------------------------------  IN: 0 mL / OUT: 550 mL / NET: -550 mL      Height (cm): 172.7 (10-30 @ 05:59)  PHYSICAL EXAM:  Constitutional: comfortably in bed; NAD; difficult to understand with mumbling  Head: NC/AT  Neck: supple; no JVD  Respiratory: CTA B/L; no W/R/R, no retractions  Cardiac: +S1/S2; RRR; no M/R/G; PMI non-displaced  Gastrointestinal: abdomen soft, NT/ND; no rebound or guarding; +BSx4  Extremities: WWP, no clubbing or cyanosis; b/l pitting LE edema with hyperpigmentation up to mid shin  Neurologic: awake, answers questions appropriately  Vascular Access:      LABS:  10-30    143  |  95[L]  |  33[H]  ----------------------------<  89  4.2   |  40[H]  |  2.06[H]    Ca    8.5      30 Oct 2024 05:30  Phos  4.1     10-29  Mg     1.7     10-29    TPro  6.5  /  Alb  2.7[L]  /  TBili  0.7  /  DBili      /  AST  26  /  ALT  14  /  AlkPhos  46  10-30                          12.2   6.79  )-----------( 94       ( 29 Oct 2024 05:30 )             40.7       Urine Studies:  Urinalysis Basic - ( 30 Oct 2024 05:30 )    Color:  / Appearance:  / SG:  / pH:   Gluc: 89 mg/dL / Ketone:   / Bili:  / Urobili:    Blood:  / Protein:  / Nitrite:    Leuk Esterase:  / RBC:  / WBC    Sq Epi:  / Non Sq Epi:  / Bacteria:       Creatinine, Random Urine: 34 mg/dL (10-29 @ 09:53)  Protein/Creatinine Ratio Calculation: 1.2 Ratio (10-29 @ 09:53)  Sodium, Random Urine: 35 mmol/L (10-28 @ 11:26)  Creatinine, Random Urine: 112 mg/dL (10-28 @ 11:26)  Protein/Creatinine Ratio Calculation: 1.6 Ratio (10-28 @ 11:26)  Osmolality, Random Urine: 478 mosm/kg (10-28 @ 11:26)  Potassium, Random Urine: 56 mmol/L (10-28 @ 11:26)  Sodium, Random Urine: 63 mmol/L (10-27 @ 17:00)  Osmolality, Random Urine: 431 mosm/kg (10-27 @ 17:00)  Potassium, Random Urine: 63 mmol/L (10-27 @ 17:00)    RADIOLOGY & ADDITIONAL STUDIES:  < from: US Retroperitoneal B-Scan Limited (10.29.24 @ 12:43) >    FINDINGS:  Right kidney: 17.5 cm. Increased renal parenchymal echogenicity. Several   renal cysts, some with thin internal septations. The largest cyst in the   right measures 8.6 cm in the midportion. No solid renal mass,   hydronephrosis or calculi.    Left kidney: 17.4 cm. Increased renal parenchymal echogenicity. Several   cysts, some with thin internal septations. The largest cyst in the left   kidney measures 5.9 cm at the upper pole. No solid renal mass,   hydronephrosis or calculi.    IMPRESSION:    Multiple large bilateral renal cysts.    Increased renal parenchymal echogenicity suggestive of medical renal   disease.    No hydronephrosis.    --- End of Report ---    < end of copied text >

## 2024-10-31 LAB
ANION GAP SERPL CALC-SCNC: 8 MMOL/L — SIGNIFICANT CHANGE UP (ref 5–17)
BASOPHILS # BLD AUTO: 0.01 K/UL — SIGNIFICANT CHANGE UP (ref 0–0.2)
BASOPHILS NFR BLD AUTO: 0.2 % — SIGNIFICANT CHANGE UP (ref 0–2)
BUN SERPL-MCNC: 33 MG/DL — HIGH (ref 7–23)
CALCIUM SERPL-MCNC: 8.6 MG/DL — SIGNIFICANT CHANGE UP (ref 8.4–10.5)
CHLORIDE SERPL-SCNC: 94 MMOL/L — LOW (ref 96–108)
CO2 SERPL-SCNC: 38 MMOL/L — HIGH (ref 22–31)
CREAT SERPL-MCNC: 1.93 MG/DL — HIGH (ref 0.5–1.3)
EGFR: 33 ML/MIN/1.73M2 — LOW
EOSINOPHIL # BLD AUTO: 0.03 K/UL — SIGNIFICANT CHANGE UP (ref 0–0.5)
EOSINOPHIL NFR BLD AUTO: 0.5 % — SIGNIFICANT CHANGE UP (ref 0–6)
GLUCOSE SERPL-MCNC: 80 MG/DL — SIGNIFICANT CHANGE UP (ref 70–99)
HCT VFR BLD CALC: 42.7 % — SIGNIFICANT CHANGE UP (ref 39–50)
HGB BLD-MCNC: 13.2 G/DL — SIGNIFICANT CHANGE UP (ref 13–17)
IMM GRANULOCYTES NFR BLD AUTO: 0.5 % — SIGNIFICANT CHANGE UP (ref 0–0.9)
LYMPHOCYTES # BLD AUTO: 0.5 K/UL — LOW (ref 1–3.3)
LYMPHOCYTES # BLD AUTO: 7.6 % — LOW (ref 13–44)
MAGNESIUM SERPL-MCNC: 1.9 MG/DL — SIGNIFICANT CHANGE UP (ref 1.6–2.6)
MCHC RBC-ENTMCNC: 29.6 PG — SIGNIFICANT CHANGE UP (ref 27–34)
MCHC RBC-ENTMCNC: 30.9 G/DL — LOW (ref 32–36)
MCV RBC AUTO: 95.7 FL — SIGNIFICANT CHANGE UP (ref 80–100)
MONOCYTES # BLD AUTO: 0.67 K/UL — SIGNIFICANT CHANGE UP (ref 0–0.9)
MONOCYTES NFR BLD AUTO: 10.2 % — SIGNIFICANT CHANGE UP (ref 2–14)
NEUTROPHILS # BLD AUTO: 5.36 K/UL — SIGNIFICANT CHANGE UP (ref 1.8–7.4)
NEUTROPHILS NFR BLD AUTO: 81 % — HIGH (ref 43–77)
NRBC # BLD: 0 /100 WBCS — SIGNIFICANT CHANGE UP (ref 0–0)
PHOSPHATE SERPL-MCNC: 3.2 MG/DL — SIGNIFICANT CHANGE UP (ref 2.5–4.5)
PLATELET # BLD AUTO: 119 K/UL — LOW (ref 150–400)
POTASSIUM SERPL-MCNC: 4 MMOL/L — SIGNIFICANT CHANGE UP (ref 3.5–5.3)
POTASSIUM SERPL-SCNC: 4 MMOL/L — SIGNIFICANT CHANGE UP (ref 3.5–5.3)
RBC # BLD: 4.46 M/UL — SIGNIFICANT CHANGE UP (ref 4.2–5.8)
RBC # FLD: 13.6 % — SIGNIFICANT CHANGE UP (ref 10.3–14.5)
SODIUM SERPL-SCNC: 140 MMOL/L — SIGNIFICANT CHANGE UP (ref 135–145)
WBC # BLD: 6.6 K/UL — SIGNIFICANT CHANGE UP (ref 3.8–10.5)
WBC # FLD AUTO: 6.6 K/UL — SIGNIFICANT CHANGE UP (ref 3.8–10.5)

## 2024-10-31 PROCEDURE — 99232 SBSQ HOSP IP/OBS MODERATE 35: CPT | Mod: GC

## 2024-10-31 PROCEDURE — 99233 SBSQ HOSP IP/OBS HIGH 50: CPT

## 2024-10-31 RX ORDER — FAMOTIDINE 10 MG/ML
1 INJECTION INTRAVENOUS
Refills: 0 | DISCHARGE

## 2024-10-31 RX ORDER — ASPIRIN/MAG CARB/ALUMINUM AMIN 325 MG
1 TABLET ORAL
Qty: 30 | Refills: 0
Start: 2024-10-31 | End: 2024-11-29

## 2024-10-31 RX ORDER — FUROSEMIDE 40 MG
1 TABLET ORAL
Qty: 0 | Refills: 0 | DISCHARGE
Start: 2024-10-31

## 2024-10-31 RX ORDER — HYDRALAZINE HYDROCHLORIDE 50 MG/1
1 TABLET, FILM COATED ORAL
Qty: 60 | Refills: 0
Start: 2024-10-31 | End: 2024-11-29

## 2024-10-31 RX ORDER — FUROSEMIDE 40 MG
1 TABLET ORAL
Qty: 30 | Refills: 0
Start: 2024-10-31 | End: 2024-11-29

## 2024-10-31 RX ORDER — HYDRALAZINE HYDROCHLORIDE 50 MG/1
50 TABLET, FILM COATED ORAL EVERY 12 HOURS
Refills: 0 | Status: DISCONTINUED | OUTPATIENT
Start: 2024-10-31 | End: 2024-11-01

## 2024-10-31 RX ORDER — METOPROLOL TARTRATE 50 MG
1 TABLET ORAL
Qty: 30 | Refills: 0
Start: 2024-10-31 | End: 2024-11-29

## 2024-10-31 RX ORDER — FAMOTIDINE 10 MG/ML
1 INJECTION INTRAVENOUS
Qty: 30 | Refills: 0
Start: 2024-10-31 | End: 2024-11-29

## 2024-10-31 RX ORDER — ASPIRIN/MAG CARB/ALUMINUM AMIN 325 MG
1 TABLET ORAL
Qty: 0 | Refills: 0 | DISCHARGE
Start: 2024-10-31

## 2024-10-31 RX ORDER — HYDRALAZINE HYDROCHLORIDE 50 MG/1
1 TABLET, FILM COATED ORAL
Qty: 0 | Refills: 0 | DISCHARGE
Start: 2024-10-31

## 2024-10-31 RX ADMIN — Medication 81 MILLIGRAM(S): at 11:24

## 2024-10-31 RX ADMIN — Medication 40 MILLIGRAM(S): at 06:36

## 2024-10-31 RX ADMIN — Medication 1 TABLET(S): at 11:24

## 2024-10-31 RX ADMIN — Medication 40 MILLIGRAM(S): at 21:10

## 2024-10-31 RX ADMIN — DAPTOMYCIN 120 MILLIGRAM(S): 500 INJECTION, POWDER, LYOPHILIZED, FOR SOLUTION INTRAVENOUS at 18:23

## 2024-10-31 RX ADMIN — PANTOPRAZOLE SODIUM 40 MILLIGRAM(S): 40 TABLET, DELAYED RELEASE ORAL at 06:36

## 2024-10-31 RX ADMIN — Medication 30 MILLIGRAM(S): at 11:24

## 2024-10-31 RX ADMIN — HYDRALAZINE HYDROCHLORIDE 25 MILLIGRAM(S): 50 TABLET, FILM COATED ORAL at 06:35

## 2024-10-31 RX ADMIN — Medication 50 MILLIGRAM(S): at 06:36

## 2024-10-31 RX ADMIN — CLONIDINE HYDROCHLORIDE 0.1 MILLIGRAM(S): 0.2 TABLET ORAL at 06:36

## 2024-10-31 RX ADMIN — HYDRALAZINE HYDROCHLORIDE 50 MILLIGRAM(S): 50 TABLET, FILM COATED ORAL at 18:32

## 2024-10-31 NOTE — PROGRESS NOTE ADULT - TIME BILLING
As above
Managing SSTI
As above
As above
Bedside exam and interview   Reviewed vitals, labs   Discussed patient's plan of care with house staff   Documentation of encounter  Excludes teaching time and/or separately reported services
Bedside exam and interview   Reviewed vitals, labs   Discussed patient's plan of care with housestaff   Documentation of encounter  Excludes teaching time and/or separately reported services
Bedside exam and interview   Reviewed vitals, labs   Discussed patient's plan of care with house staff   Documentation of encounter  Excludes teaching time and/or separately reported services
Bedside exam and interview   Reviewed vitals, labs   Discussed patient's plan of care with house staff   Documentation of encounter   Excludes teaching time and/or separately reported services

## 2024-10-31 NOTE — PROGRESS NOTE ADULT - PROBLEM SELECTOR PLAN 6
Pt w/ hx of CAD (last cardiac cath 18yrs ago). EKG w/ new T wave inversions (10/27) and previous RBBB, left axis deviation (2021). Denies CP, SOB, palpitations.  - c/w [id number]        - patient says he is taking 325mg aspirin otc at home; will need conversation regarding aspirin prior to discharge  - c/w atorvastatin 40mg

## 2024-10-31 NOTE — PROGRESS NOTE ADULT - PROBLEM SELECTOR PLAN 3
#HFpEF  Likely acute on chronic CHF in setting of IV fluid administration. On exam, chest clear to auscultation w/ b/l lower extremity edema to mid-shins, erythema and tenderness. No previous echo available. Labs remarkable for BNP of 8.6k, Trop still elevated, normal CKMB. CXR revealed clear lung fields. EKG w/ known RBBB/LAFB. New T-wave inversions noted on EKG today on V4/V5 compared to 2021. Pt denies CP. trops mildly elevated iso KALA, CK/CKMB wnl. TTE showing EF 50%. Appears to be first episode decompensated heart failure episode.   - Outpatient cardiology collateral:        - Dr. Kwesi Denny ([phone]), lost to f/u in 2015 after cardiac cath performed, no record of heart failure.        - Last cardiac cath 7/6/15 showed non-obstructive CAD, increased right-sided pressures, mild pulmonary HTN,          mildly increased PVRI, and decreased cardiac output. Proximal RCA showed mild diffuse disease.         - no previous echos. Echo 10/29 showed EF 50%, likely acute on chronic CHF exacerbation.   - Cardiology consulted, appreciate recs       - c/w lasix 40mg IV BID (D1 10/28)            - goal net negative 1-1.5L in 24 hrs       - c/w metoprolol 50mg to QD (from home BID dose, given active diuresis)        - Strict I/Os       - Daily weights       - c/w ASA 81mg & statin 40mg QD  - Wean O2 as tolerated above  - bicarb 41 today 10/31 for diuretic induced contraction alkalosis, given diamox 500  - Start Hydralazine 25mg TID for afterload reduction    #Demand Ischemia  Patient with elevated troponin on admission, downtrending now. No chest pain, SOB. No signs of any ST elevations or depressions on EKG. Elevated troponin likely from demand ischemia in the setting of heart failure exacerbation.

## 2024-10-31 NOTE — PROGRESS NOTE ADULT - SUBJECTIVE AND OBJECTIVE BOX
VERN CRISTOBAL  84y  Male      Patient is a 84y old  Male who presents with a chief complaint of Leg Swelling (31 Oct 2024 12:47)        Notable Events:      REVIEW OF SYSTEMS:  CONSTITUTIONAL: No fever  EYES: No visual disturbances  ENMT: No hearing changes, No sore throat  RESPIRATORY: No cough, No shortness of breath  CARDIOVASCULAR: No chest pain, No palpitations  GASTROINTESTINAL: No abdominal pain, No nausea, No vomiting, No diarrhea, No melena, No hematochezia.  GENITOURINARY: No dysuria, frequency, hematuria, or incontinence  NEUROLOGICAL: No headaches, No weakness, No numbness, No tremors  SKIN: No lesions, No rashes  MUSCULOSKELETAL: No joint pain, No backpain, No extremity pain  PSYCHIATRIC: No depression, anxiety, or difficulty sleeping  FAMILY HISTORY:  No pertinent family history in first degree relatives      Vital Signs Last 24 Hrs  T(C): 37 (31 Oct 2024 10:36), Max: 37 (31 Oct 2024 10:36)  T(F): 98.6 (31 Oct 2024 10:36), Max: 98.6 (31 Oct 2024 10:36)  HR: 90 (31 Oct 2024 10:36) (74 - 99)  BP: 150/73 (31 Oct 2024 10:36) (142/84 - 169/94)  BP(mean): --  RR: 19 (31 Oct 2024 10:36) (16 - 32)  SpO2: 99% (31 Oct 2024 10:36) (96% - 99%)    Parameters below as of 31 Oct 2024 10:36  Patient On (Oxygen Delivery Method): nasal cannula  O2 Flow (L/min): 2    No Known Allergies      PHYSICAL EXAM:  GENERAL: No acute distress  HEAD:  Atraumatic, Normocephalic  EYES: Normal extraocular movements. Conjunctiva and sclera are normal  NECK: Supple. Normal thyroid. No lymphadenopathy  NERVOUS SYSTEM:  Alert & Oriented X3. Motor Strength 5/5 B/L upper and lower extremities; DTRs 2+ intact and symmetric.  CHEST/LUNG: No wheezing or crackles present.  CARDIAC: Regular rate and rhythm. No murmurs or rubs. Jugular venous pressure is normal.  ABDOMEN: Nontender. Nondistended. Soft abdomen.  EXTREMITIES:  2+ Peripheral Pulses, No clubbing, cyanosis, or edema.  SKIN: No rashes or lesions    Consultant(s) Notes Reviewed:  [x ] YES  [ ] NO  Care Discussed with Consultants/Other Providers [ x] YES  [ ] NO    LABS:                        13.2   6.60  )-----------( 119      ( 31 Oct 2024 07:48 )             42.7     10-31    140  |  94[L]  |  33[H]  ----------------------------<  80  4.0   |  38[H]  |  1.93[H]    Ca    8.6      31 Oct 2024 07:48  Phos  3.2     10-31  Mg     1.9     10-31    TPro  6.5  /  Alb  2.7[L]  /  TBili  0.7  /  DBili  x   /  AST  26  /  ALT  14  /  AlkPhos  46  10-30          Urinalysis Basic - ( 31 Oct 2024 07:48 )    Color: x / Appearance: x / SG: x / pH: x  Gluc: 80 mg/dL / Ketone: x  / Bili: x / Urobili: x   Blood: x / Protein: x / Nitrite: x   Leuk Esterase: x / RBC: x / WBC x   Sq Epi: x / Non Sq Epi: x / Bacteria: x        Culture - Blood (collected 28 Oct 2024 16:40)  Source: .Blood BLOOD  Preliminary Report (31 Oct 2024 01:01):    No growth at 48 Hours        RADIOLOGY & ADDITIONAL TESTS:    Imaging Personally Reviewed:  [x] YES  [ ] NO  acetaminophen     Tablet .. 650 milliGRAM(s) Oral every 6 hours PRN  aspirin  chewable 81 milliGRAM(s) Oral daily  atorvastatin 40 milliGRAM(s) Oral at bedtime  DAPTOmycin IVPB 500 milliGRAM(s) IV Intermittent every 24 hours  enoxaparin Injectable 30 milliGRAM(s) SubCutaneous every 24 hours  furosemide    Tablet 40 milliGRAM(s) Oral every 24 hours  hydrALAZINE 50 milliGRAM(s) Oral every 12 hours  metoprolol succinate ER 50 milliGRAM(s) Oral every 24 hours  multivitamin 1 Tablet(s) Oral daily  ondansetron Injectable 4 milliGRAM(s) IV Push every 8 hours PRN  pantoprazole    Tablet 40 milliGRAM(s) Oral before breakfast      HEALTH ISSUES - PROBLEM Dx:  Acute hypoxic respiratory failure    Acute CHF    KALA (acute kidney injury)    Prophylactic measure    GERD (gastroesophageal reflux disease)    HTN (hypertension)    HLD (hyperlipidemia)    Anxiety and depression    CAD (coronary artery disease)    T wave inversion in EKG    Severe sepsis    PEDRITO on CPAP    Bilateral cellulitis of lower leg           VERN CRISTOBAL  84y  Male      Patient is a 84y old  Male who presents with a chief complaint of Leg Swelling (31 Oct 2024 12:47)      Notable Events: Patient transitioned to oral diuretic regimen as well as increased of hydralazine to 50mg BID. Disposition planning for [facility] ([facility] Hypoxemia improved today however ambulatory desaturation will require home oxygen.      REVIEW OF SYSTEMS:  CONSTITUTIONAL: No fever  EYES: No visual disturbances  ENMT: No hearing changes, No sore throat  RESPIRATORY: No cough, No shortness of breath  CARDIOVASCULAR: No chest pain, No palpitations  GASTROINTESTINAL: No abdominal pain, No nausea, No vomiting, No diarrhea, No melena, No hematochezia.  GENITOURINARY: No dysuria, frequency, hematuria, or incontinence  NEUROLOGICAL: No headaches, No weakness, No numbness, No tremors  SKIN: No lesions, No rashes  MUSCULOSKELETAL: No joint pain, No backpain, No extremity pain  PSYCHIATRIC: No depression, anxiety, or difficulty sleeping  FAMILY HISTORY:  No pertinent family history in first degree relatives      Vital Signs Last 24 Hrs  T(C): 37 (31 Oct 2024 10:36), Max: 37 (31 Oct 2024 10:36)  T(F): 98.6 (31 Oct 2024 10:36), Max: 98.6 (31 Oct 2024 10:36)  HR: 90 (31 Oct 2024 10:36) (74 - 99)  BP: 150/73 (31 Oct 2024 10:36) (142/84 - 169/94)  BP(mean): --  RR: 19 (31 Oct 2024 10:36) (16 - 32)  SpO2: 99% (31 Oct 2024 10:36) (96% - 99%)    Parameters below as of 31 Oct 2024 10:36  Patient On (Oxygen Delivery Method): nasal cannula  O2 Flow (L/min): 2    No Known Allergies      PHYSICAL EXAM:  GENERAL: No acute distress  HEAD:  Atraumatic, Normocephalic  EYES: Normal extraocular movements. Conjunctiva and sclera are normal  NECK: Supple. Normal thyroid. No lymphadenopathy  NERVOUS SYSTEM:  Alert & Oriented X3. Motor Strength 5/5 B/L upper and lower extremities; DTRs 2+ intact and symmetric.  CHEST/LUNG: No wheezing or crackles present.  CARDIAC: Regular rate and rhythm. No murmurs or rubs. Jugular venous pressure is normal.  ABDOMEN: Nontender. Nondistended. Soft abdomen.  EXTREMITIES:  2+ Peripheral Pulses, No clubbing, cyanosis, or edema.  SKIN: No rashes or lesions    Consultant(s) Notes Reviewed:  [x ] YES  [ ] NO  Care Discussed with Consultants/Other Providers [ x] YES  [ ] NO    LABS:                        13.2   6.60  )-----------( 119      ( 31 Oct 2024 07:48 )             42.7     10-31    140  |  94[L]  |  33[H]  ----------------------------<  80  4.0   |  38[H]  |  1.93[H]    Ca    8.6      31 Oct 2024 07:48  Phos  3.2     10-31  Mg     1.9     10-31    TPro  6.5  /  Alb  2.7[L]  /  TBili  0.7  /  DBili  x   /  AST  26  /  ALT  14  /  AlkPhos  46  10-30          Urinalysis Basic - ( 31 Oct 2024 07:48 )    Color: x / Appearance: x / SG: x / pH: x  Gluc: 80 mg/dL / Ketone: x  / Bili: x / Urobili: x   Blood: x / Protein: x / Nitrite: x   Leuk Esterase: x / RBC: x / WBC x   Sq Epi: x / Non Sq Epi: x / Bacteria: x        Culture - Blood (collected 28 Oct 2024 16:40)  Source: .Blood BLOOD  Preliminary Report (31 Oct 2024 01:01):    No growth at 48 Hours        RADIOLOGY & ADDITIONAL TESTS:    Imaging Personally Reviewed:  [x] YES  [ ] NO  acetaminophen     Tablet .. 650 milliGRAM(s) Oral every 6 hours PRN  aspirin  chewable 81 milliGRAM(s) Oral daily  atorvastatin 40 milliGRAM(s) Oral at bedtime  DAPTOmycin IVPB 500 milliGRAM(s) IV Intermittent every 24 hours  enoxaparin Injectable 30 milliGRAM(s) SubCutaneous every 24 hours  furosemide    Tablet 40 milliGRAM(s) Oral every 24 hours  hydrALAZINE 50 milliGRAM(s) Oral every 12 hours  metoprolol succinate ER 50 milliGRAM(s) Oral every 24 hours  multivitamin 1 Tablet(s) Oral daily  ondansetron Injectable 4 milliGRAM(s) IV Push every 8 hours PRN  pantoprazole    Tablet 40 milliGRAM(s) Oral before breakfast      HEALTH ISSUES - PROBLEM Dx:  Acute hypoxic respiratory failure    Acute CHF    KALA (acute kidney injury)    Prophylactic measure    GERD (gastroesophageal reflux disease)    HTN (hypertension)    HLD (hyperlipidemia)    Anxiety and depression    CAD (coronary artery disease)    T wave inversion in EKG    Severe sepsis    PEDRITO on CPAP    Bilateral cellulitis of lower leg

## 2024-10-31 NOTE — PROGRESS NOTE ADULT - PROBLEM SELECTOR PLAN 1
#Severe Sepsis 2/2 DRARIUS LE Cellulitis  Lactate down trending (2.3 to 2.1) s/p fluids. DARRIUS LE pain and worsened b/l LE swelling and erythema, nonpurulent. DARRIUS LE U/S negative for DVTs. Received vanc/zosyn x1 in ED. Patient hospitalized in 09/2021 w/ similar presentation. Initially on cefazolin 1g IV Q12H (renally dosed; D1 10/27), switched to vancomycin given cultures growing staph capitis, haemolyticus, diphteroids, likely contaminants  - no growth on blood culture 10/28  - ID recs to c/w daptomycin qd for 7d; if repeat blood cultures show no growth, then transition to PO linezolid for d/c

## 2024-10-31 NOTE — PROGRESS NOTE ADULT - SUBJECTIVE AND OBJECTIVE BOX
**Incomplete Note**    Cardiology Consult    O/N:  Interval History/HPI: Pt seen and examined at bedside, NAD, denies chest pain/discomfort/pressure. ROS unremarkable   Telemetry:      Review of Systems:  CONSTITUTIONAL:  No weight loss, fever, chills, weakness or fatigue.  HEENT:  Eyes:  No visual loss, blurred vision, double vision or yellow sclerae. Ears, Nose, Throat:  No hearing loss, sneezing, congestion, runny nose or sore throat.  SKIN:  No rash or itching.  CARDIOVASCULAR:  No chest pain, chest pressure or chest discomfort. No palpitations or edema.  RESPIRATORY:  No shortness of breath, cough or sputum.  GASTROINTESTINAL:  No anorexia, nausea, vomiting or diarrhea. No abdominal pain or blood.  GENITOURINARY: No Burning on urination.   NEUROLOGICAL:  see HPI  MUSCULOSKELETAL:  No muscle, back pain, joint pain or stiffness.  HEMATOLOGIC:  No anemia, bleeding or bruising.  LYMPHATICS:  No enlarged nodes. No history of splenectomy.  PSYCHIATRIC:  No history of depression or anxiety.  ENDOCRINOLOGIC:  No reports of sweating, cold or heat intolerance. No polyuria or polydipsia.  ALLERGIES:  No history of asthma, hives, eczema or rhinitis.    OBJECTIVE  T(C): 37 (10-31-24 @ 10:36), Max: 37 (10-31-24 @ 10:36)  HR: 90 (10-31-24 @ 10:36) (74 - 99)  BP: 150/73 (10-31-24 @ 10:36) (142/84 - 177/85)  RR: 19 (10-31-24 @ 10:36) (16 - 32)  SpO2: 99% (10-31-24 @ 10:36) (96% - 99%)    10-30-24 @ 07:01  -  10-31-24 @ 07:00  --------------------------------------------------------  IN: 0 mL / OUT: 1150 mL / NET: -1150 mL    10-31-24 @ 07:01  -  10-31-24 @ 12:47  --------------------------------------------------------  IN: 0 mL / OUT: 200 mL / NET: -200 mL        PHYSICAL EXAM:    Constitutional: resting comfortably in bed; NAD  HEENT: NC/AT, PERRL, EOMI, anicteric sclera, no nasal discharge; uvula midline, no oropharyngeal erythema or exudates; MMM  Neck: supple; no thyromegaly, JVP ? cm H20, JVD +/-  Respiratory: CTA B/L; no W/R/R, no retractions  Cardiac: +S1/S2; RRR; no M/R/G; PMI non-displaced  Gastrointestinal: soft, NT/ND; no rebound or guarding; +BSx4  Extremities: WWP, no clubbing or cyanosis; no peripheral edema  Musculoskeletal: NROM x4; no joint swelling, tenderness or erythema  Vascular: 2+ radial, DP/PT pulses B/L  Dermatologic: skin warm, dry and intact; no rashes, wounds, or scars  Lymphatic: no submandibular or cervical LAD  Neurologic: AAOx3; CNII-XII grossly intact; no focal deficits    LABS:                        13.2   6.60  )-----------( 119      ( 31 Oct 2024 07:48 )             42.7     10-31    140  |  94[L]  |  33[H]  ----------------------------<  80  4.0   |  38[H]  |  1.93[H]    Ca    8.6      31 Oct 2024 07:48  Phos  3.2     10-31  Mg     1.9     10-31    TPro  6.5  /  Alb  2.7[L]  /  TBili  0.7  /  DBili  x   /  AST  26  /  ALT  14  /  AlkPhos  46  10-30      Urinalysis Basic - ( 31 Oct 2024 07:48 )    Color: x / Appearance: x / SG: x / pH: x  Gluc: 80 mg/dL / Ketone: x  / Bili: x / Urobili: x   Blood: x / Protein: x / Nitrite: x   Leuk Esterase: x / RBC: x / WBC x   Sq Epi: x / Non Sq Epi: x / Bacteria: x        RADIOLOGY & ADDITIONAL TESTS:  Reviewed .    MEDICATIONS  (STANDING):  aspirin  chewable 81 milliGRAM(s) Oral daily  atorvastatin 40 milliGRAM(s) Oral at bedtime  DAPTOmycin IVPB 500 milliGRAM(s) IV Intermittent every 24 hours  enoxaparin Injectable 30 milliGRAM(s) SubCutaneous every 24 hours  furosemide    Tablet 40 milliGRAM(s) Oral every 24 hours  hydrALAZINE 50 milliGRAM(s) Oral every 12 hours  metoprolol succinate ER 50 milliGRAM(s) Oral every 24 hours  multivitamin 1 Tablet(s) Oral daily  pantoprazole    Tablet 40 milliGRAM(s) Oral before breakfast    MEDICATIONS  (PRN):  acetaminophen     Tablet .. 650 milliGRAM(s) Oral every 6 hours PRN Temp greater or equal to 38C (100.4F), Mild Pain (1 - 3)  ondansetron Injectable 4 milliGRAM(s) IV Push every 8 hours PRN Nausea and/or Vomiting     **Incomplete Note**    Cardiology Consult    O/N: No acute events overnight.  Interval History/HPI: Pt seen and examined at bedside. Pt was resting comfortably in bed, in NAD, denies chest pain/discomfort/pressure. ROS unremarkable.       OBJECTIVE  T(C): 37 (10-31-24 @ 10:36), Max: 37 (10-31-24 @ 10:36)  HR: 90 (10-31-24 @ 10:36) (74 - 99)  BP: 150/73 (10-31-24 @ 10:36) (142/84 - 177/85)  RR: 19 (10-31-24 @ 10:36) (16 - 32)  SpO2: 99% (10-31-24 @ 10:36) (96% - 99%)    10-30-24 @ 07:01  -  10-31-24 @ 07:00  --------------------------------------------------------  IN: 0 mL / OUT: 1150 mL / NET: -1150 mL    10-31-24 @ 07:01  -  10-31-24 @ 12:47  --------------------------------------------------------  IN: 0 mL / OUT: 200 mL / NET: -200 mL        PHYSICAL EXAM:    Constitutional: resting comfortably in bed; NAD  HEENT: NC/AT, PERRL, EOMI, anicteric sclera, no nasal discharge; uvula midline, no oropharyngeal erythema or exudates; MMM  Neck: supple; no thyromegaly, no evidence of elevated JVD  Respiratory: CTA B/L; no W/R/R, no retractions  Cardiac: +S1/S2; RRR; no M/R/G; PMI non-displaced  Gastrointestinal: soft, NT/ND; no rebound or guarding; +BSx4  Extremities: WWP, no clubbing or cyanosis; non-pitting peripheral edema present, no pitting peripheral edema  Musculoskeletal: NROM x4; no joint swelling, tenderness or erythema  Vascular: 2+ radial, DP/PT pulses B/L  Dermatologic: skin warm, dry and intact; no rashes, wounds, or scars  Lymphatic: no submandibular or cervical LAD  Neurologic: AAOx3;    LABS:                        13.2   6.60  )-----------( 119      ( 31 Oct 2024 07:48 )             42.7     10-31    140  |  94[L]  |  33[H]  ----------------------------<  80  4.0   |  38[H]  |  1.93[H]    Ca    8.6      31 Oct 2024 07:48  Phos  3.2     10-31  Mg     1.9     10-31    TPro  6.5  /  Alb  2.7[L]  /  TBili  0.7  /  DBili  x   /  AST  26  /  ALT  14  /  AlkPhos  46  10-30      Urinalysis Basic - ( 31 Oct 2024 07:48 )    Color: x / Appearance: x / SG: x / pH: x  Gluc: 80 mg/dL / Ketone: x  / Bili: x / Urobili: x   Blood: x / Protein: x / Nitrite: x   Leuk Esterase: x / RBC: x / WBC x   Sq Epi: x / Non Sq Epi: x / Bacteria: x        RADIOLOGY & ADDITIONAL TESTS:  Reviewed .    MEDICATIONS  (STANDING):  aspirin  chewable 81 milliGRAM(s) Oral daily  atorvastatin 40 milliGRAM(s) Oral at bedtime  DAPTOmycin IVPB 500 milliGRAM(s) IV Intermittent every 24 hours  enoxaparin Injectable 30 milliGRAM(s) SubCutaneous every 24 hours  furosemide    Tablet 40 milliGRAM(s) Oral every 24 hours  hydrALAZINE 50 milliGRAM(s) Oral every 12 hours  metoprolol succinate ER 50 milliGRAM(s) Oral every 24 hours  multivitamin 1 Tablet(s) Oral daily  pantoprazole    Tablet 40 milliGRAM(s) Oral before breakfast    MEDICATIONS  (PRN):  acetaminophen     Tablet .. 650 milliGRAM(s) Oral every 6 hours PRN Temp greater or equal to 38C (100.4F), Mild Pain (1 - 3)  ondansetron Injectable 4 milliGRAM(s) IV Push every 8 hours PRN Nausea and/or Vomiting     Cardiology Consult    O/N: No acute events overnight.  Interval History/HPI: Pt seen and examined at bedside. Pt was resting comfortably in bed, in NAD, denies chest pain/discomfort/pressure. ROS unremarkable.       OBJECTIVE  T(C): 37 (10-31-24 @ 10:36), Max: 37 (10-31-24 @ 10:36)  HR: 90 (10-31-24 @ 10:36) (74 - 99)  BP: 150/73 (10-31-24 @ 10:36) (142/84 - 177/85)  RR: 19 (10-31-24 @ 10:36) (16 - 32)  SpO2: 99% (10-31-24 @ 10:36) (96% - 99%)    10-30-24 @ 07:01  -  10-31-24 @ 07:00  --------------------------------------------------------  IN: 0 mL / OUT: 1150 mL / NET: -1150 mL    10-31-24 @ 07:01  -  10-31-24 @ 12:47  --------------------------------------------------------  IN: 0 mL / OUT: 200 mL / NET: -200 mL        PHYSICAL EXAM:    Constitutional: resting comfortably in bed; NAD  HEENT: NC/AT, PERRL, EOMI, anicteric sclera, no nasal discharge; uvula midline, no oropharyngeal erythema or exudates; MMM  Neck: supple; no thyromegaly, no evidence of elevated JVD  Respiratory: CTA B/L; no W/R/R, no retractions  Cardiac: +S1/S2; RRR; no M/R/G; PMI non-displaced  Gastrointestinal: soft, NT/ND; no rebound or guarding; +BSx4  Extremities: WWP, no clubbing or cyanosis; non-pitting peripheral edema present, no pitting peripheral edema  Musculoskeletal: NROM x4; no joint swelling, tenderness or erythema  Vascular: 2+ radial, DP/PT pulses B/L  Dermatologic: skin warm, dry and intact; no rashes, wounds, or scars  Lymphatic: no submandibular or cervical LAD  Neurologic: AAOx3    LABS:                        13.2   6.60  )-----------( 119      ( 31 Oct 2024 07:48 )             42.7     10-31    140  |  94[L]  |  33[H]  ----------------------------<  80  4.0   |  38[H]  |  1.93[H]    Ca    8.6      31 Oct 2024 07:48  Phos  3.2     10-31  Mg     1.9     10-31    TPro  6.5  /  Alb  2.7[L]  /  TBili  0.7  /  DBili  x   /  AST  26  /  ALT  14  /  AlkPhos  46  10-30      Urinalysis Basic - ( 31 Oct 2024 07:48 )    Color: x / Appearance: x / SG: x / pH: x  Gluc: 80 mg/dL / Ketone: x  / Bili: x / Urobili: x   Blood: x / Protein: x / Nitrite: x   Leuk Esterase: x / RBC: x / WBC x   Sq Epi: x / Non Sq Epi: x / Bacteria: x        RADIOLOGY & ADDITIONAL TESTS:  Reviewed .    MEDICATIONS  (STANDING):  aspirin  chewable 81 milliGRAM(s) Oral daily  atorvastatin 40 milliGRAM(s) Oral at bedtime  DAPTOmycin IVPB 500 milliGRAM(s) IV Intermittent every 24 hours  enoxaparin Injectable 30 milliGRAM(s) SubCutaneous every 24 hours  furosemide    Tablet 40 milliGRAM(s) Oral every 24 hours  hydrALAZINE 50 milliGRAM(s) Oral every 12 hours  metoprolol succinate ER 50 milliGRAM(s) Oral every 24 hours  multivitamin 1 Tablet(s) Oral daily  pantoprazole    Tablet 40 milliGRAM(s) Oral before breakfast    MEDICATIONS  (PRN):  acetaminophen     Tablet .. 650 milliGRAM(s) Oral every 6 hours PRN Temp greater or equal to 38C (100.4F), Mild Pain (1 - 3)  ondansetron Injectable 4 milliGRAM(s) IV Push every 8 hours PRN Nausea and/or Vomiting

## 2024-10-31 NOTE — PROGRESS NOTE ADULT - ASSESSMENT
84M with PMHx of HTN, HLD, CAD (last PCI 18y ago), HFpEF, CKD (baseline Cr 1.6), GERD, stroke (residual RUE weakness), anxiety/depression who was brought to ED by family for b/l LE swelling, redness, and oozing-he was admitted to [facility] for bilateral lower extremity swelling and non-purulent cellulitis. He was found to be in clinical acute decompensated HF pending TTE for which cardiology was consulted for. Since beginning higher dosage IV diuresis 2 days ago, he has responded appropriately with correlating improvement in volume status on physical exam.     Review of Studies:  10/29/24 Echo: EF 50%, mild symmetric LVH, mildly reduced LV systolic function, grade II LV diastolic dysfunction, normal RV size and systolic function, severely dilated LA, aortic sclerosis without significant stenosis, trace aortic regurgitation, PaSP 35mmHg, no pericardial effusion  10/28/24 ECG: NSR w/ LAD, RBBB, and LVH by aVL criteria    Outpatient Providers: PCP Dr. Stone Allen, Cardiologist - Dr. Kwesi Denny [phone]    Home Medications: atorvastatin 40mg QD, clonidine 0.1mg BID, doxepin 10mg x2 QD, famotidine 20mg BID, fosinopril 40mg QD, HCTZ 12.5mg, metoprolol 50mg BID, ASA 81mg QD    Recommendations:    #ADHF  #HFpEF  Etiology: ?IHD  Rhythm: SR  Hemodynamics: normotensive   Perfusion: WWP making adequate urine  GDMT:   Please c/w Toprol 50mg XL QD holding for HR<60 and/or SBP <110  Holding additional GDMT given KALA on CKD  Diuretics: Decreased LE swelling, mildly overloaded on exam. Please transition to PO lasix 40mg QD starting 10/31/24 and c/w strict I/Os, daily standing weights (last documented at 79.8 Kg on 10/27), and 1.2L fluid restriction  Agree with one time dose of Diamox 500mg PO given increased bicarbonate to aid with diuresis   Net negative goal 2L  Device: n/a  AT: n/a    #?Chronic Coronary Disease  -C/w ASA 81mg QD  -C/w atorvastatin 40mg QD  -Please obtain collateral cardiac hx from pt's cardiologist (LHC/RHC, TTE, stress tests)    #HTN  -c/w hold ACEI in the setting of KALA  -Please start hydralazine 25mg TID holding for SBP <110, as patient has been persistently hypertensive >140/90     #HLD  -LDL 30 (at goal)  -C/w Atorvastatin 40mg QD    Recommendations above are preliminary pending discussion with an attending cardiologist  Plan was discussed with primary team  We'll continue to follow, thank you for the consultation      Tino Castro PGY6 CVD Fellow  Cardiology Consult Pager: [phone]  CCU Pager: [phone]  Heart Failure Pager: [phone]       84M with PMHx of HTN, HLD, CAD (last PCI 18y ago), HFpEF, CKD (baseline Cr 1.6), GERD, stroke (residual RUE weakness), anxiety/depression who was brought to ED by family for b/l LE swelling, redness, and oozing-he was admitted to [facility] for bilateral lower extremity swelling and non-purulent cellulitis. He was found to be in clinical acute decompensated HF pending TTE for which cardiology was consulted for. Since beginning higher dosage IV diuresis 3 days ago, he has responded appropriately with correlating improvement in volume status on physical exam.     Review of Studies:  10/29/24 Echo: EF 50%, mild symmetric LVH, mildly reduced LV systolic function, grade II LV diastolic dysfunction, normal RV size and systolic function, severely dilated LA, aortic sclerosis without significant stenosis, trace aortic regurgitation, PaSP 35mmHg, no pericardial effusion  10/28/24 ECG: NSR w/ LAD, RBBB, and LVH by aVL criteria    Outpatient Providers: PCP Dr. Stone Allen, Cardiologist - Dr. Kwesi Denny [phone]    Home Medications: atorvastatin 40mg QD, clonidine 0.1mg BID, doxepin 10mg x2 QD, famotidine 20mg BID, fosinopril 40mg QD, HCTZ 12.5mg, metoprolol 50mg BID, ASA 81mg QD    Recommendations:    #ADHF  #HFpEF  Etiology: ?IHD  Rhythm: SR  Hemodynamics: normotensive   Perfusion: WWP making adequate urine  GDMT:   Please c/w Toprol 50mg XL QD holding for HR<60 and/or SBP <110  Holding additional GDMT given KALA on CKD  Diuretics: Decreased LE swelling, euvolemic on exam. Please c/w PO lasix 40mg QD, strict I/Os, daily standing weights, and 1.2L fluid restriction  Net negative goal 2L  Device: n/a  AT: n/a    #?Chronic Coronary Disease  -C/w ASA 81mg QD  -C/w atorvastatin 40mg QD  -Please obtain collateral cardiac hx from pt's cardiologist (LHC/RHC, TTE, stress tests)    #HTN  -c/w hold ACEI in the setting of KALA  -Please transition to hydralazine 50mg BID as patient has been under goal, and increase frequency to 50mg TID tomorrow, holding for SBP <110     #HLD  -LDL 30 (at goal)  -C/w Atorvastatin 40mg QD    Recommendations above are preliminary pending discussion with an attending cardiologist  Plan was discussed with primary team  Cardiology signing off. Please reconsult as needed, thank you for the consultation      Tino Castro PGY6 CVD Fellow  Cardiology Consult Pager: [phone]  CCU Pager: [phone]  Heart Failure Pager: [phone]       84M with PMHx of HTN, HLD, CAD (last PCI 18y ago), HFpEF, CKD (baseline Cr 1.6), GERD, stroke (residual RUE weakness), anxiety/depression who was brought to ED by family for b/l LE swelling, redness, and oozing-he was admitted to [facility] for bilateral lower extremity swelling and non-purulent cellulitis. He was found to be in clinical acute decompensated HF pending TTE for which cardiology was consulted for. Since beginning higher dosage IV diuresis 3 days ago, he has responded appropriately with correlating improvement in volume status on physical exam.     Review of Studies:  10/29/24 Echo: EF 50%, mild symmetric LVH, mildly reduced LV systolic function, grade II LV diastolic dysfunction, normal RV size and systolic function, severely dilated LA, aortic sclerosis without significant stenosis, trace aortic regurgitation, PaSP 35mmHg, no pericardial effusion  10/28/24 ECG: NSR w/ LAD, RBBB, and LVH by aVL criteria    Outpatient Providers: PCP Dr. Stone Allen, Cardiologist - Dr. Kwesi Denny [phone]    Home Medications: atorvastatin 40mg QD, clonidine 0.1mg BID, doxepin 10mg x2 QD, famotidine 20mg BID, fosinopril 40mg QD, HCTZ 12.5mg, metoprolol 50mg BID, ASA 81mg QD    Recommendations:    #ADHF  #HFpEF  Etiology: ?IHD  Rhythm: SR  Hemodynamics: normotensive   Perfusion: WWP making adequate urine  GDMT:   Please c/w Toprol 50mg XL QD holding for HR<60 and/or SBP <110  Holding additional GDMT given KALA on CKD  Diuretics: Decreased LE swelling, euvolemic on exam Please c/w PO lasix 40mg QD, strict I/Os, daily standing weights, and 1.2L fluid restriction  Net negative goal 2L  Please place b/l ACE bandage wrappings around legs   Device: n/a  AT: n/a    #Chronic Coronary Disease  -C/w ASA 81mg QD  -C/w atorvastatin 40mg QD    #HTN  -c/w hold ACEI in the setting of KALA  -Please transition to hydralazine 50mg BID to improve medication adherence and increase frequency to 50mg TID tomorrow if suboptimal BPs (SBP >140s), holding for SBP <110     #HLD  -LDL 30 (at goal)  -C/w Atorvastatin 40mg QD    ***please ensure patient follows up with his cardiologist within 2-4 weeks of d/c     Recommendations above are preliminary pending discussion with an attending cardiologist  Plan was discussed with primary team  Cardiology signing off. Please reconsult as needed, thank you for the consultation      Tino Castro PGY6 CVD Fellow  Cardiology Consult Pager: [phone]  CCU Pager: [phone]  Heart Failure Pager: [phone]

## 2024-10-31 NOTE — PROGRESS NOTE ADULT - ASSESSMENT
65yo M with history of  CKD stage III baseline creatinine of 1.68, hypertension, CHF,  hyperlipidemia ,CAD, old stroke admitted for CHF exacerbation with bilateral lower limbs cellulitis, nephrology consulted for KALA on CKD Creat 2.1      Assessment  Patient has improvement in Cr to 1.93, imaging showing bilaterally enlarged kidneys with multiple cysts suggesting polycystic kidney disease. Patient has not followed with outpatient nephrology. Imaging showing no obstruction suggesting likely ATN due to infection. Ctm for improvement in KALA  Patient continues to be hypertensive with -150    - Strict I/O chart  - Daily I/O chart  - Daily weight  -trend BMP daily  - Optimize SBP above 110  - Avoid IV contrast  -Cardiology review    Sub-nephrotic range proteinuria likely secondary to KALA ? tubular injury: nephrotic syndrome unlikely  UPCR <3.5  -Pt will benefit from ACE I or ARB after the acute phase  -treatment of underlying course    Pt needs cardiology consult  will follow     67yo M with history of  CKD stage III baseline creatinine of 1.68, hypertension, CHF,  hyperlipidemia ,CAD, old stroke admitted for CHF exacerbation with bilateral lower limbs cellulitis, nephrology consulted for KALA on CKD Creat 2.1      Assessment  - Patient has improvement in Cr 2 -->1.93, Ctm for improvement in KALA  - imaging showing bilaterally enlarged kidneys with multiple cysts suggesting polycystic kidney disease.  - Patient continues to be hypertensive with -150    Plan  - Continue BP meds, Optimize SBP above 110  - trend BMP daily  - Avoid IV contrast  - No indications for dialysis  -Consider ACE I or ARB after the acute phase

## 2024-10-31 NOTE — PROGRESS NOTE ADULT - ATTENDING COMMENTS
Patient is an 83 yo Male with PMHx of CAD with remote PCI, Heart Failure, Stroke (residual RUE weakness), HTN, HLD, CKD (baseline Cr 1.6), GERD, Anxiety/Depression  brought to ED by family for b/l LE swelling, redness, and oozing-he admitted to [facility] for  non-purulent cellulitis with bacteremia found to be in Acute Heart Failure Exacerbation for which Cardiology was consulted    Review of Studies:  - TTE 10/29/24: EF 50%, mild symmetric LVH, mildly reduced LV systolic function, grade I LV diastolic dysfunction, normal RV size and systolic function, severely dilated LA, aortic sclerosis without significant stenosis, trace aortic regurgitation, PaSP 35mmHg, no pericardial effusion  - ECG10/28/24 : NSR w/ LAD, RBBB, and LVH by aVL criteria    Outpatient Providers: PCP Dr. Stone Allen    Home Medications: Metoprolol succinate ER 50mg BID, HCTZ 12.5mg QD, Clonidine 0.1mg BID, Atorvastatin 40mg HS, Fosinopril 40mg QD    # Acute on Chronic Diastolic Heart Failure Exacerbation   # ASCVD: CAD; CVA  - Patient has a history of Heart failure and recalls Remote stress test and revascularization but are unable to detail patient's cardiac history further  - In the months leading up to hospitalization he had progressive worsening Dyspnea and orthopnea making ADLS difficult. In recent weeks patient has been sleeping with numerous pillows in a seated position due to pronounced orthopnea. In the past week he has had worsening lower extremity edema prompting him to come to the hospital  - Echo performed this hospitalization revealed Grade II Diastolic Dysfunction with a severely dilated LA as well as borderline LV function without significant valvular heart disease  - Clinically patient is warm, well perfused and compensated with JVP around the clavicle, clear lungs and improving pitting lower extremity edema. Patient feels improved overall  - Patient was diuresed sufficiently. Would maintain on Lasix 40 mg PO daily   - Cont with Toprol 50 mg po Daily with strict holding parameters  - Will aim to wean Clonidine; Pt s/p 0.1 clonidine today. Would not continue to wean patient off clonidine  - Would increase to Hydralazine 50 mg po BID with strict holding parameters and uptitrate to 50 mg po TID as tolerated  - Would not resume Fosinopril upon DC. Patient has KLAA on CKD  - Please obtain daily weight. Last Weight documented at 79.8 Kg on 10/27  - From a CAD standpoint please continue with ASA 81mg  and high intensity statin atorvastatin 40mg QD  - Please ensure patient has close outpatient cardiology with his cardiologist within 2 weeks of DC

## 2024-10-31 NOTE — CHART NOTE - NSCHARTNOTEFT_GEN_A_CORE
During oxygen testing, the patient is de-sating due to hx of CHF w/ recent exacerbation and previous hx of PEDRITO and will require oxygen for home use. the patient is mobile in the home and will require a portable system.    ICD: I50.9, J96.01, G47.33

## 2024-10-31 NOTE — PROGRESS NOTE ADULT - PROBLEM SELECTOR PLAN 9
Therapeutic interchange for home omeprazole 20mg --> pantoprazole 40mg QD  - c/w pantoprazole 40mg qd
Therapeutic interchange for home omeprazole 20mg --> pantoprazole 40mg QD  - c/w pantoprazole 40mg qd
Therapeutic interchange for home omeprazole 20mg --> pantoprazole 40mg QD
Therapeutic interchange for home omeprazole 20mg --> pantoprazole 40mg QD

## 2024-10-31 NOTE — PROGRESS NOTE ADULT - PROBLEM SELECTOR PLAN 10
Home doxepin 10mg QD; no therapeutic interchange for this here  - Hold home doxepin 10mg      - Per pharmacy, no withdrawal symptoms if short term

## 2024-10-31 NOTE — PROGRESS NOTE ADULT - SUBJECTIVE AND OBJECTIVE BOX
Nephrology progress note  Subjective  Patient examined at bedside, resting comfortably in no apparent distress. Patient reports feeling with pain improving in legs with no current symptoms. Denies fever, chills, N/V, dysuria.    Allergies:  No Known Allergies    Hospital Medications:   MEDICATIONS  (STANDING):  aspirin  chewable 81 milliGRAM(s) Oral daily  atorvastatin 40 milliGRAM(s) Oral at bedtime  DAPTOmycin IVPB 500 milliGRAM(s) IV Intermittent every 24 hours  enoxaparin Injectable 30 milliGRAM(s) SubCutaneous every 24 hours  furosemide    Tablet 40 milliGRAM(s) Oral every 24 hours  hydrALAZINE 50 milliGRAM(s) Oral every 12 hours  metoprolol succinate ER 50 milliGRAM(s) Oral every 24 hours  multivitamin 1 Tablet(s) Oral daily  pantoprazole    Tablet 40 milliGRAM(s) Oral before breakfast      VITALS:  T(F): 98.6 (10-31-24 @ 10:36), Max: 98.6 (10-31-24 @ 10:36)  HR: 90 (10-31-24 @ 10:36)  BP: 150/73 (10-31-24 @ 10:36)  RR: 19 (10-31-24 @ 10:36)  SpO2: 99% (10-31-24 @ 10:36)  Wt(kg): --    10-29 @ 07:01  -  10-30 @ 07:00  --------------------------------------------------------  IN: 0 mL / OUT: 775 mL / NET: -775 mL    10-30 @ 07:01  -  10-31 @ 07:00  --------------------------------------------------------  IN: 0 mL / OUT: 1150 mL / NET: -1150 mL    10-31 @ 07:01  -  10-31 @ 13:50  --------------------------------------------------------  IN: 0 mL / OUT: 200 mL / NET: -200 mL        PHYSICAL EXAM:  Constitutional: comfortably in bed; NAD; difficult to understand, mumbling  Head: NC/AT  Respiratory: CTA B/L; no W/R/R, no retractions  Cardiac: +S1/S2; RRR; no M/R/G  Gastrointestinal: abdomen soft, NT/ND; no rebound or guarding; +BS  Extremities: WWP, b/l pitting LE edema with hyperpigmentation up to mid shin, edema noted in hands  Neurologic: awake, answers questions appropriately    LABS:  10-31    140  |  94[L]  |  33[H]  ----------------------------<  80  4.0   |  38[H]  |  1.93[H]    Ca    8.6      31 Oct 2024 07:48  Phos  3.2     10-31  Mg     1.9     10-31    TPro  6.5  /  Alb  2.7[L]  /  TBili  0.7  /  DBili      /  AST  26  /  ALT  14  /  AlkPhos  46  10-30                          13.2   6.60  )-----------( 119      ( 31 Oct 2024 07:48 )             42.7       Urine Studies:  Urinalysis Basic - ( 31 Oct 2024 07:48 )    Color:  / Appearance:  / SG:  / pH:   Gluc: 80 mg/dL / Ketone:   / Bili:  / Urobili:    Blood:  / Protein:  / Nitrite:    Leuk Esterase:  / RBC:  / WBC    Sq Epi:  / Non Sq Epi:  / Bacteria:       Creatinine, Random Urine: 34 mg/dL (10-29 @ 09:53)  Protein/Creatinine Ratio Calculation: 1.2 Ratio (10-29 @ 09:53)  Sodium, Random Urine: 35 mmol/L (10-28 @ 11:26)  Creatinine, Random Urine: 112 mg/dL (10-28 @ 11:26)  Protein/Creatinine Ratio Calculation: 1.6 Ratio (10-28 @ 11:26)  Osmolality, Random Urine: 478 mosm/kg (10-28 @ 11:26)  Potassium, Random Urine: 56 mmol/L (10-28 @ 11:26)  Sodium, Random Urine: 63 mmol/L (10-27 @ 17:00)  Osmolality, Random Urine: 431 mosm/kg (10-27 @ 17:00)  Potassium, Random Urine: 63 mmol/L (10-27 @ 17:00)    RADIOLOGY & ADDITIONAL STUDIES: reviewed

## 2024-10-31 NOTE — PROGRESS NOTE ADULT - PROBLEM SELECTOR PROBLEM 4
KALA (acute kidney injury)

## 2024-10-31 NOTE — PROGRESS NOTE ADULT - ATTENDING COMMENTS
I: HTN uncontrolled.   Cr 1.93.   A: KALA improving. Possible PKD on US.   P: No indications for dialysis. Continue BP meds. Follow SCr.

## 2024-10-31 NOTE — PROGRESS NOTE ADULT - ATTENDING COMMENTS
84 M with HTN, HLD, CAD s/p remote PCI, CKD (most recent Cr 1.4 in 2021), stroke with residual RUE weakness, who was BIB family for bilateral lower extremity swelling found to be in severe sepsis with Staph bacteremia likely 2/2 RLE cellulitis, c/c/b ADHF.    Remains on IV dapto, no clinical s/s of infection, continued improve bilateral LE swelling. Able to wean to room air at rest today but required O2 with ambulation.    #staph bacteremia  #severe sepsis 2/2 non-purulent RLE cellulitis   - f/u BCx 10/27 - staph haemolyticus, methicillin-resistant Staph epi  - f/u BCx 10/28 NGTD  - IV dapto while inpatient, transition to linezolid PO BID on d/c    #KALA on CKD 2/2 ATN - Cr 2.1, baseline 1.68  - +granular casts, +proteinuria  - likely sepsis mediated ATN  - RBUS without hydro, has multiple large renal cysts c/w PCKD  - avoid nephrotxoins    #CAD s/p remote PCI  #type II NSTEMI  #ADHF, chronic HFpEF  #HTN  - ASA, statin  - daily weights, I/Os  - has not seen cardiologist in many years, no known recent ischemic work up (prior with non-obstructive CAD in 2015)  - c/w toprol 50  - defer ACE-I and SGLT2i iso KALA on CKD  - transition to PO lasix today  - increase hydral to 50 BID, wean off clonidine  - diamox 500 x 1    #hypoxia  - was not on home O2  - in ED was hypoxic following IVF administration  - CXR without congestion or opacification  - IS/OOTC, optimize respiratory status, suspect due to deconditioning and poor inspiratory effort  - able to wean to RA at rest today, needed O2 with ambulation  - age-adjusted d-dimer indicates low-risk for VTE, dopplers negative, pt without additional RF for PE    #dispo  - discussed at length with patient and partner regarding medical recommendation for [facility] in the setting of patient's weakness, hospitalization with new HF diagnosis and bacteremia, still requiring O2 etc.  - patient initially agreeable to [facility], however partner is adamant that patient will be better cared for at home and she is concerned of financial losses from [facility], despite CM and SW discussing with them  - pt is agreeable with partner, plan for home tomorrow once home O2 obtained

## 2024-11-01 ENCOUNTER — TRANSCRIPTION ENCOUNTER (OUTPATIENT)
Age: 84
End: 2024-11-01

## 2024-11-01 VITALS — OXYGEN SATURATION: 94 % | RESPIRATION RATE: 17 BRPM

## 2024-11-01 PROCEDURE — 99239 HOSP IP/OBS DSCHRG MGMT >30: CPT

## 2024-11-01 PROCEDURE — 99233 SBSQ HOSP IP/OBS HIGH 50: CPT

## 2024-11-01 RX ORDER — LINEZOLID 600 MG
1 TABLET ORAL
Qty: 6 | Refills: 0
Start: 2024-11-01 | End: 2024-11-03

## 2024-11-01 RX ORDER — DAPTOMYCIN 500 MG/10ML
500 INJECTION, POWDER, LYOPHILIZED, FOR SOLUTION INTRAVENOUS EVERY 24 HOURS
Refills: 0 | Status: DISCONTINUED | OUTPATIENT
Start: 2024-11-01 | End: 2024-11-01

## 2024-11-01 RX ADMIN — PANTOPRAZOLE SODIUM 40 MILLIGRAM(S): 40 TABLET, DELAYED RELEASE ORAL at 05:54

## 2024-11-01 RX ADMIN — DAPTOMYCIN 120 MILLIGRAM(S): 500 INJECTION, POWDER, LYOPHILIZED, FOR SOLUTION INTRAVENOUS at 14:01

## 2024-11-01 RX ADMIN — Medication 81 MILLIGRAM(S): at 12:16

## 2024-11-01 RX ADMIN — Medication 1 TABLET(S): at 12:16

## 2024-11-01 RX ADMIN — Medication 50 MILLIGRAM(S): at 05:54

## 2024-11-01 RX ADMIN — HYDRALAZINE HYDROCHLORIDE 50 MILLIGRAM(S): 50 TABLET, FILM COATED ORAL at 05:54

## 2024-11-01 RX ADMIN — Medication 40 MILLIGRAM(S): at 05:54

## 2024-11-01 RX ADMIN — Medication 30 MILLIGRAM(S): at 12:16

## 2024-11-01 NOTE — PROGRESS NOTE ADULT - PROVIDER SPECIALTY LIST ADULT
Cardiology
Cardiology
Infectious Disease
Internal Medicine
Nephrology
Internal Medicine
Nephrology
Nephrology
Rehab Medicine
Cardiology
Nephrology
Internal Medicine
Internal Medicine

## 2024-11-01 NOTE — PROGRESS NOTE ADULT - ASSESSMENT
I M    84 year old Male with a PMHx significant for CHF, HTN, HLD, CAD, CKD, GERD, anxiety/depression, brought to ED by family, for 3 week history of leg swelling, redness and blistering that has worsened in the last 3 days and found to have cellulitis, ED course c/b respiratory distress after IV fluids thought to be HF exacerbation improving after IV lasix. Now monitoring AHRF, HF exacerbation and non-purulent cellulitis.     Problem/Plan - 1:  ·  Problem: Severe sepsis.   ·  Plan: #Severe Sepsis 2/2 DARIRUS LE Cellulitis  Lactate down trending (2.3 to 2.1) s/p fluids. DARRIUS LE pain and worsened b/l LE swelling and erythema, nonpurulent. DARRIUS LE U/S negative for DVTs. Received vanc/zosyn x1 in ED. Patient hospitalized in 09/2021 w/ similar presentation. Initially on cefazolin 1g IV Q12H (renally dosed; D1 10/27), switched to vancomycin given cultures growing staph capitis, haemolyticus, diphteroids, likely contaminants  - no growth on blood culture 10/28  - ID recs to c/w daptomycin qd for 7d; if repeat blood cultures show no growth, then transition to PO linezolid for d/c.    Problem/Plan - 2:  ·  Problem: Acute hypoxic respiratory failure.   ·  Plan: Patient showing improvement in hypoxemia. In the setting of b/l LE edema and a likely hx of CHF. Patient also reports 70 pack years of smoking. No wheezes or crackles on exam.   - now resolving   - c/w diuresis below  - strict I/Os   - Wean O2 as tolerated (currently 95% o2 sat on 2L NC).    Problem/Plan - 3:  ·  Problem: Acute CHF.   ·  Plan: #HFpEF  Likely acute on chronic CHF in setting of IV fluid administration. On exam, chest clear to auscultation w/ b/l lower extremity edema to mid-shins, erythema and tenderness. No previous echo available. Labs remarkable for BNP of 8.6k, Trop still elevated, normal CKMB. CXR revealed clear lung fields. EKG w/ known RBBB/LAFB. New T-wave inversions noted on EKG today on V4/V5 compared to 2021. Pt denies CP. trops mildly elevated iso KALA, CK/CKMB wnl. TTE showing EF 50%. Appears to be first episode decompensated heart failure episode.   - Outpatient cardiology collateral:        - Dr. Kwesi Denny ([phone]), lost to f/u in 2015 after cardiac cath performed, no record of heart failure.        - Last cardiac cath 7/6/15 showed non-obstructive CAD, increased right-sided pressures, mild pulmonary HTN,          mildly increased PVRI, and decreased cardiac output. Proximal RCA showed mild diffuse disease.         - no previous echos. Echo 10/29 showed EF 50%, likely acute on chronic CHF exacerbation.   - Cardiology consulted, appreciate recs       - c/w lasix 40mg IV BID (D1 10/28)            - goal net negative 1-1.5L in 24 hrs       - c/w metoprolol 50mg to QD (from home BID dose, given active diuresis)        - Strict I/Os       - Daily weights       - c/w ASA 81mg & statin 40mg QD  - Wean O2 as tolerated above  - bicarb 41 today 10/31 for diuretic induced contraction alkalosis, given diamox 500  - Start Hydralazine 25mg TID for afterload reduction    #Demand Ischemia  Patient with elevated troponin on admission, downtrending now. No chest pain, SOB. No signs of any ST elevations or depressions on EKG. Elevated troponin likely from demand ischemia in the setting of heart failure exacerbation.    Problem/Plan - 4:  ·  Problem: KALA (acute kidney injury).   ·  Plan: #ATN on CKD likely due to infection.   Pt w/ hx of chronic renal insufficiency (most recent creatinine 1.68 2024). Cr 2.12 on this admission (peaked on admission). Creatinine clearance 28 on admission. . FeUrea 33.7% suggestive of pre-renal etiology. Renal ultrasound showing no hydronephrosis or obstruction. Likely ATN on CKD due to infection.   - Caution w/ fluids given likely flash pulmonary edema s/p 500cc  - nephrology recs: strict Is & Os, treat w/ ACEi or ARB after acute phase & tx underlying course       - fluid restriction 1.2 L/D  - renally dose medication:       - lovenox 30mg for DVT ppx       - holding home gabapentin 600mg until renal improvement.    Problem/Plan - 5:  ·  Problem: T wave inversion in EKG.   ·  Plan: EKG w/ known RBBB/LAFB. T-wave inversions noted on EKG 10/27 on V4/V5 compared to 2021. Denies CP. Trops downtrending now. CK/CKMB wnl.    - See CHF plan above.    Problem/Plan - 6:  ·  Problem: CAD (coronary artery disease).   ·  Plan: Pt w/ hx of CAD (last cardiac cath 18yrs ago). EKG w/ new T wave inversions (10/27) and previous RBBB, left axis deviation (2021). Denies CP, SOB, palpitations.  - c/w [id number]        - patient says he is taking 325mg aspirin otc at home; will need conversation regarding aspirin prior to discharge  - c/w atorvastatin 40mg.    Problem/Plan - 7:  ·  Problem: HTN (hypertension).   ·  Plan: - c/w metoprolol 50mg QD (switched from home BID)  - c/w clonidine 0.1mg BID to prevent refractory HTN  - held home fosinopril 40mg & home HCTZ 12.5 mg qd given KALA  - Start Hydralazine 25mg TID.    Problem/Plan - 8:  ·  Problem: HLD (hyperlipidemia).   ·  Plan: - c/w atorvastatin 40mg.    Problem/Plan - 9:  ·  Problem: GERD (gastroesophageal reflux disease).   ·  Plan: Therapeutic interchange for home omeprazole 20mg --> pantoprazole 40mg QD  - c/w pantoprazole 40mg qd.    Problem/Plan - 10:  ·  Problem: Anxiety and depression.   ·  Plan; Home doxepin 10mg QD; no therapeutic interchange for this here  - Hold home doxepin 10mg      - Per pharmacy, no withdrawal symptoms if short term.    Problem/Plan - 11:  ·  Problem: PEDRITO on CPAP.   ·  Plan: On previous admission patient was noted to have PEDRITO. Patient reports that he does not use a CPAP machine at home but he is amendable to trying it.   - c/w CPAP overnight.    Problem/Plan - 12:  ·  Problem: Prophylactic measure.   ·  Plan: Fluids: s/p 500cc NS bolus w/ flash pulm edema; caution w/ fluids  Electrolytes: replete as needed  Nutrition: DASH diet  PPI ppx: pantoprazole 40mg QD  Dvt ppx: lovenox 30mg (renally dosed)   Activity: as tolerated, PT and OT  Dispo: PM&R  recommending [facility].

## 2024-11-01 NOTE — PROGRESS NOTE ADULT - ASSESSMENT
85yo M with history of  CKD stage III baseline creatinine of 1.68, hypertension, CHF,  hyperlipidemia ,CAD, old stroke admitted for CHF exacerbation with bilateral lower limbs cellulitis, nephrology consulted for KALA on CKD Creat 2.1      Assessment  - Patient has improvement in Cr 2 -->1.93  - imaging showing bilaterally enlarged kidneys with multiple cysts suggesting polycystic kidney disease.  - Patient improvement in blood pressure 135/69.    Plan  - Continue BP meds, Optimize SBP above 110  - trend BMP daily  - Avoid IV contrast  - No indications for dialysis  -Consider ACE I or ARB after the acute phase

## 2024-11-01 NOTE — DISCHARGE NOTE NURSING/CASE MANAGEMENT/SOCIAL WORK - PATIENT PORTAL LINK FT
You can access the FollowMyHealth Patient Portal offered by [facility] by registering at the following website: [email or web address]. By joining [facility]’s FollowMyHealth portal, you will also be able to view your health information using other applications (apps) compatible with our system.

## 2024-11-01 NOTE — PROGRESS NOTE ADULT - REASON FOR ADMISSION
Leg Swelling

## 2024-11-01 NOTE — PROGRESS NOTE ADULT - SUBJECTIVE AND OBJECTIVE BOX
Physical Medicine and Rehabilitation Progress Note :       Patient is a 84y old  Male who presents with a chief complaint of Leg Swelling (31 Oct 2024 13:49)      HPI:  CRISTOBAL PORRAS is a 84y year old Male with a PMHx significant for HTN, HLD, CAD (last PCI 18y ago), CHF (no echo on file), CKD (Cr 1.4 in 9/21), GERD, stroke (residual RUE weakness), anxiety/depression, brought to ED by girlfriend Tanya, for leg swelling, redness, and oozing. Patient reports that his leg swelling has been on-going for approximately 3 weeks. Has gotten worse in the last 3 days, to the point he felt he needed to come in. Was dropped off by his girlfriend Tanya Lucas, however, she was not at bedside to provide further collateral. Patient has an extensive history of smoking approximately 70 years. Denies any similar episodes to this, although upon chart review, patient was admitted for bilateral lower extremity swelling and left leg non-purulent cellulitis in 9/2021. Denies any history of heart problems, fevers, chills, abdominal pain, chest pain. Endorses shortness of breath with exertion, orthopnea, leg.     Patient is unsure exactly what medications he takes at home but when shown a list of medications from [facility], he endorses that he takes these medications. Patient does not remember the name of his pharmacy - pharmacy's in sure script show [facility] on [address] & [facility] [address] Per [place], his PCP appears to be Stone Allen.     ED COURSE  Vitals: T(C): 36.3 (10-27-24 @ 17:14), Max: 36.4 (10-27-24 @ 14:25); HR: 61 (10-27-24 @ 17:14) (61 - 74); BP: 148/94 (10-27-24 @ 17:14) (130/81 - 148/94); RR: 20 (10-27-24 @ 17:14) (18 - 22); SpO2: 96% (10-27-24 @ 17:14) (92% - 97%)  Labs: WBC 13.82 (89.5% neutrophils), lactate 2.3, 2.1 s/p 500cc, creatinine 2.12, .7, ESR 34, BNP 8629, trop 76  EKG: normal sinus w/ left axis deviation, RBBB (seen on EKG 2021), LVH, T wave inversions in V4/V5/V6 (V4/V5 not seen in 2021)  Imaging: CXR wet read showing increased pulmonary vasculature but not pleural effusions or consolidations  Interventions: 500cc 0.9% NS, 20mg lasix IV , 1000mg vanc , 3.375g zosyn  Consults: ICU team for likely plash pulmonary edema 2/2 500cc fluid bolus   (27 Oct 2024 17:21)                            13.2   6.60  )-----------( 119      ( 31 Oct 2024 07:48 )             42.7       10-31    140  |  94[L]  |  33[H]  ----------------------------<  80  4.0   |  38[H]  |  1.93[H]    Ca    8.6      31 Oct 2024 07:48  Phos  3.2     10-31  Mg     1.9     10-31      Vital Signs Last 24 Hrs  T(C): 36.7 (01 Nov 2024 05:45), Max: 37 (31 Oct 2024 10:36)  T(F): 98 (01 Nov 2024 05:45), Max: 98.6 (31 Oct 2024 10:36)  HR: 84 (01 Nov 2024 06:35) (66 - 97)  BP: 129/78 (01 Nov 2024 05:45) (95/54 - 150/73)  BP(mean): --  RR: 18 (01 Nov 2024 06:35) (18 - 19)  SpO2: 95% (01 Nov 2024 06:35) (94% - 99%)    Parameters below as of 01 Nov 2024 06:35  Patient On (Oxygen Delivery Method): nasal cannula  O2 Flow (L/min): 2      MEDICATIONS  (STANDING):  aspirin  chewable 81 milliGRAM(s) Oral daily  atorvastatin 40 milliGRAM(s) Oral at bedtime  DAPTOmycin IVPB 500 milliGRAM(s) IV Intermittent every 24 hours  enoxaparin Injectable 30 milliGRAM(s) SubCutaneous every 24 hours  furosemide    Tablet 40 milliGRAM(s) Oral every 24 hours  hydrALAZINE 50 milliGRAM(s) Oral every 12 hours  metoprolol succinate ER 50 milliGRAM(s) Oral every 24 hours  multivitamin 1 Tablet(s) Oral daily  pantoprazole    Tablet 40 milliGRAM(s) Oral before breakfast    MEDICATIONS  (PRN):  acetaminophen     Tablet .. 650 milliGRAM(s) Oral every 6 hours PRN Temp greater or equal to 38C (100.4F), Mild Pain (1 - 3)  ondansetron Injectable 4 milliGRAM(s) IV Push every 8 hours PRN Nausea and/or Vomiting      T(C): 36.7 (11-01-24 @ 05:45), Max: 37 (10-31-24 @ 10:36)  HR: 84 (11-01-24 @ 06:35) (66 - 97)  BP: 129/78 (11-01-24 @ 05:45) (95/54 - 150/73)  RR: 18 (11-01-24 @ 06:35) (18 - 19)  SpO2: 95% (11-01-24 @ 06:35) (94% - 99%)    Physical Exam:    84 y o man lying comfortably in semi Willis's position , awake , alert , no acute complaints     Head: normocephalic , atraumatic    Eyes: PERRLA , EOMI , no nystagmus , sclera anicteric    ENT / FACE: neg nasal discharge , uvula midline , no oropharyngeal erythema / exudate    Neck: supple , negative JVD , negative carotid bruits , no thyromegaly    Chest: CTA bilaterally     Cardiovascular: regular rate and rhythm , neg murmurs / rubs / gallops    Abdomen: soft , non distended , no tenderness to palpation in all 4 quadrants ,  normal bowel sounds     Lower Extremities: 3+ lower extremity edema w/ erythema and hyperpigmented discoloration up to mid-shin, ttp , ulcerations on R lateral leg,    Neurologic Exam:     Alert and oriented  x 3     Motor Exam:        > 4/5 x 3 extremities , R UE 3-/5     Sensation:         intact to light touch x 4 extremities     DTR:           biceps/brachioradialis: equal                            patella/ankle: equal         10/31/2024 Functional Status Assessment :       Pain Assessment/Number Scale (0-10) Adult  Presence of Pain: denies pain/discomfort (Rating = 0)  Pain Rating (0-10): Rest: 0 (no pain/absence of nonverbal indicators of pain)  Pain Rating (0-10): Activity: 0 (no pain/absence of nonverbal indicators of pain)    Safety      [facility] Functional Assessment: Basic Mobility  Type of Assessment: Daily assessment  Turning from your back to your side while in a flat bed without using bedrails?: 4 = No assist / stand by assistance  Moving from lying on your back to sitting on the flat side of a flat bed without using bedrails?: 3 = A little assistance  Moving to and from a bed to a chair (including a wheelchair)?: 3 = A little assistance  Standing up from a chair using your arms (e.g. wheelchair or bedside chair)?: 3 = A little assistance  Walking in hospital room?: 3 = A little assistance  Climbing 3-5 steps with a railing?: 2 = A lot of assistance  Score: 18   Row Comment: Ask the patient "How much help from another person do you currently need? (If the patient hasn't done an activity recently, how much help from another person do you think he/she needs if he/she tried?)    Cognitive/Neuro      Cognitive/Neuro/Behavioral  Cognitive/Neuro/Behavioral [WDL Definition: Alert; opens eyes spontaneously; arouses to voice or touch; oriented x 4; follows commands; speech spontaneous, logical; purposeful motor response; behavior appropriate to situation]: WDL except  Level of Consciousness: alert  Arousal Level: arouses to repeated stimulation;  arouses to touch/gentle shaking;  arouses to voice  Speech: garbled  Mood/Behavior: calm;  cooperative    Language Assistance  Preferred Language to Address Healthcare Preferred Language to Address Healthcare: English    Therapeutic Interventions      Bed Mobility  Bed Mobility Training Rehab Potential: good, to achieve stated therapy goals  Bed Mobility Training Sit-to-Supine: pt left OOBTC  Bed Mobility Training Supine-to-Sit: minimum assist (75% patient effort);  1 person assist;  nonverbal cues (demo/gestures);  verbal cues  Bed Mobility Training Limitations: decreased flexibility;  decreased strength;  impaired balance    Bed-Chair Transfer Training  Bed-to-Chair Transfer Training Rehab Potential: good, to achieve stated therapy goals  Transfer Training Bed-to-Chair Transfer: minimum assist (75% patient effort);  1 person assist;  nonverbal cues (demo/gestures);  verbal cues;  full weight-bearing   rollator   Transfer Training Chair-to-Bed Transfer: pt left OOBTC  Bed-to-Chair Transfer Training Transfer Safety Analysis: decreased flexibility;  impaired balance;  decreased strength    Sit-Stand Transfer Training  Sit-to-Stand Transfer Training Rehab Potential: good, to achieve stated therapy goals  Transfer Training Sit-to-Stand Transfer: minimum assist (75% patient effort);  1 person assist;  nonverbal cues (demo/gestures);  verbal cues;  full weight-bearing   rollator   Transfer Training Stand-to-Sit Transfer: minimum assist (75% patient effort);  1 person assist;  nonverbal cues (demo/gestures);  verbal cues;  full weight-bearing   rollator   Sit-to-Stand Transfer Training Transfer Safety Analysis: decreased flexibility;  decreased strength;  impaired balance;  impaired postural control    Gait Training  Gait Training Rehab Potential: good, to achieve stated therapy goals  Gait Training: contact guard;  1 person assist;  nonverbal cues (demo/gestures);  verbal cues;  full weight-bearing   rollator ;  60'x2  Gait Analysis: pt demos slightly slow jian, steady gait no loss of balance noted t/o. Kyphotic posture ;  decreased flexibility;  decreased strength;  impaired balance;  60'x2;  rollator   Type of Rest Type of Rest: standing            PM&R Impression : as above    Current disposition plan recommendation :    subacute rehab placement

## 2024-11-01 NOTE — DISCHARGE NOTE NURSING/CASE MANAGEMENT/SOCIAL WORK - FINANCIAL ASSISTANCE
[facility] provides services at a reduced cost to those who are determined to be eligible through [facility]’s financial assistance program. Information regarding [facility]’s financial assistance program can be found by going to [email or web address] or by calling [phone].

## 2024-11-01 NOTE — DISCHARGE NOTE NURSING/CASE MANAGEMENT/SOCIAL WORK - NSDCPEPTCAREGIVEDUMATLIST _GEN_ALL_CORE
Patient notified of lab results and recommendations and verbalizes understanding. Labs entered as ordered below. Heart Failure

## 2024-11-01 NOTE — PROGRESS NOTE ADULT - SUBJECTIVE AND OBJECTIVE BOX
Nephrology progress note  Subjective  No overnight events. Patient seen and bedside, reports feeling well with no complaints.     Allergies:  No Known Allergies    Hospital Medications:   MEDICATIONS  (STANDING):  aspirin  chewable 81 milliGRAM(s) Oral daily  atorvastatin 40 milliGRAM(s) Oral at bedtime  DAPTOmycin IVPB 500 milliGRAM(s) IV Intermittent every 24 hours  enoxaparin Injectable 30 milliGRAM(s) SubCutaneous every 24 hours  furosemide    Tablet 40 milliGRAM(s) Oral every 24 hours  hydrALAZINE 50 milliGRAM(s) Oral every 12 hours  metoprolol succinate ER 50 milliGRAM(s) Oral every 24 hours  multivitamin 1 Tablet(s) Oral daily  pantoprazole    Tablet 40 milliGRAM(s) Oral before breakfast    VITALS:  T(F): 98.7 (11-01-24 @ 10:37), Max: 98.7 (11-01-24 @ 10:37)  HR: 92 (11-01-24 @ 10:37)  BP: 135/69 (11-01-24 @ 10:37)  RR: 17 (11-01-24 @ 10:38)  SpO2: 94% (11-01-24 @ 10:38)  Wt(kg): --    10-30 @ 07:01  -  10-31 @ 07:00  --------------------------------------------------------  IN: 0 mL / OUT: 1150 mL / NET: -1150 mL    10-31 @ 07:01  -  11-01 @ 07:00  --------------------------------------------------------  IN: 0 mL / OUT: 500 mL / NET: -500 mL    11-01 @ 07:01  -  11-01 @ 16:04  --------------------------------------------------------  IN: 0 mL / OUT: 300 mL / NET: -300 mL        PHYSICAL EXAM:  Constitutional: NAD, well appearing  Respiratory: breathing comfortably  Extremities: b/l lower leg edema  Neurological: no focal deficits  Psychiatric: Normal mood, normal affect      LABS:  10-31    140  |  94[L]  |  33[H]  ----------------------------<  80  4.0   |  38[H]  |  1.93[H]    Ca    8.6      31 Oct 2024 07:48  Phos  3.2     10-31  Mg     1.9     10-31                            13.2   6.60  )-----------( 119      ( 31 Oct 2024 07:48 )             42.7       Urine Studies:  Urinalysis Basic - ( 31 Oct 2024 07:48 )    Color:  / Appearance:  / SG:  / pH:   Gluc: 80 mg/dL / Ketone:   / Bili:  / Urobili:    Blood:  / Protein:  / Nitrite:    Leuk Esterase:  / RBC:  / WBC    Sq Epi:  / Non Sq Epi:  / Bacteria:       Protein/Creatinine Ratio Calculation: 1.2 Ratio (10-29 @ 09:53)  Creatinine, Random Urine: 34 mg/dL (10-29 @ 09:53)  Sodium, Random Urine: 35 mmol/L (10-28 @ 11:26)  Creatinine, Random Urine: 112 mg/dL (10-28 @ 11:26)  Protein/Creatinine Ratio Calculation: 1.6 Ratio (10-28 @ 11:26)  Osmolality, Random Urine: 478 mosm/kg (10-28 @ 11:26)  Potassium, Random Urine: 56 mmol/L (10-28 @ 11:26)  Sodium, Random Urine: 63 mmol/L (10-27 @ 17:00)  Osmolality, Random Urine: 431 mosm/kg (10-27 @ 17:00)  Potassium, Random Urine: 63 mmol/L (10-27 @ 17:00)    RADIOLOGY & ADDITIONAL STUDIES: Reviewed

## 2024-11-01 NOTE — PROGRESS NOTE ADULT - ATTENDING COMMENTS
I agree with the fellow's findings and plans as written above with the following additions/amendments:    Seen and examined at bedside. feels well, LE edema improving slowly, discussed kidney disease no hx of PCKD patient knows of but may have low penetrance variant given age of CKD, will followup outpatient if discharged soon, otherwise improving slowly. Further recs as above, discussed in detail with patient and wife at bedside, primary team

## 2024-11-01 NOTE — DISCHARGE NOTE NURSING/CASE MANAGEMENT/SOCIAL WORK - NSDCFUADDAPPT_GEN_ALL_CORE_FT
Appointment with PCP Dr. Stone Allen on 11/5/24 at 12:45PM  [facility], [facility] [place] [address]

## 2024-11-03 LAB
CULTURE RESULTS: SIGNIFICANT CHANGE UP
SPECIMEN SOURCE: SIGNIFICANT CHANGE UP

## 2024-11-06 DIAGNOSIS — K21.9 GASTRO-ESOPHAGEAL REFLUX DISEASE WITHOUT ESOPHAGITIS: ICD-10-CM

## 2024-11-06 DIAGNOSIS — R65.20 SEVERE SEPSIS WITHOUT SEPTIC SHOCK: ICD-10-CM

## 2024-11-06 DIAGNOSIS — I69.351 HEMIPLEGIA AND HEMIPARESIS FOLLOWING CEREBRAL INFARCTION AFFECTING RIGHT DOMINANT SIDE: ICD-10-CM

## 2024-11-06 DIAGNOSIS — F41.8 OTHER SPECIFIED ANXIETY DISORDERS: ICD-10-CM

## 2024-11-06 DIAGNOSIS — I25.10 ATHEROSCLEROTIC HEART DISEASE OF NATIVE CORONARY ARTERY WITHOUT ANGINA PECTORIS: ICD-10-CM

## 2024-11-06 DIAGNOSIS — G47.33 OBSTRUCTIVE SLEEP APNEA (ADULT) (PEDIATRIC): ICD-10-CM

## 2024-11-06 DIAGNOSIS — N17.0 ACUTE KIDNEY FAILURE WITH TUBULAR NECROSIS: ICD-10-CM

## 2024-11-06 DIAGNOSIS — N18.9 CHRONIC KIDNEY DISEASE, UNSPECIFIED: ICD-10-CM

## 2024-11-06 DIAGNOSIS — Z41.8 ENCOUNTER FOR OTHER PROCEDURES FOR PURPOSES OTHER THAN REMEDYING HEALTH STATE: ICD-10-CM

## 2024-11-06 DIAGNOSIS — Z79.899 OTHER LONG TERM (CURRENT) DRUG THERAPY: ICD-10-CM

## 2024-11-06 DIAGNOSIS — E87.20 ACIDOSIS, UNSPECIFIED: ICD-10-CM

## 2024-11-06 DIAGNOSIS — I24.89 OTHER FORMS OF ACUTE ISCHEMIC HEART DISEASE: ICD-10-CM

## 2024-11-06 DIAGNOSIS — A41.9 SEPSIS, UNSPECIFIED ORGANISM: ICD-10-CM

## 2024-11-06 DIAGNOSIS — J96.01 ACUTE RESPIRATORY FAILURE WITH HYPOXIA: ICD-10-CM

## 2024-11-06 DIAGNOSIS — I50.33 ACUTE ON CHRONIC DIASTOLIC (CONGESTIVE) HEART FAILURE: ICD-10-CM

## 2024-11-06 DIAGNOSIS — E78.5 HYPERLIPIDEMIA, UNSPECIFIED: ICD-10-CM

## 2024-11-06 DIAGNOSIS — F17.210 NICOTINE DEPENDENCE, CIGARETTES, UNCOMPLICATED: ICD-10-CM

## 2024-11-06 DIAGNOSIS — F12.99 CANNABIS USE, UNSPECIFIED WITH UNSPECIFIED CANNABIS-INDUCED DISORDER: ICD-10-CM

## 2024-11-06 DIAGNOSIS — Q61.3 POLYCYSTIC KIDNEY, UNSPECIFIED: ICD-10-CM

## 2024-11-06 DIAGNOSIS — L03.116 CELLULITIS OF LEFT LOWER LIMB: ICD-10-CM

## 2024-11-06 DIAGNOSIS — L03.115 CELLULITIS OF RIGHT LOWER LIMB: ICD-10-CM

## 2024-11-06 PROBLEM — Z00.00 ENCOUNTER FOR PREVENTIVE HEALTH EXAMINATION: Status: ACTIVE | Noted: 2024-11-06

## 2024-11-12 ENCOUNTER — NON-APPOINTMENT (OUTPATIENT)
Age: 84
End: 2024-11-12

## 2024-11-12 ENCOUNTER — APPOINTMENT (OUTPATIENT)
Dept: HEART AND VASCULAR | Facility: CLINIC | Age: 84
End: 2024-11-12
Payer: MEDICARE

## 2024-11-12 VITALS
HEIGHT: 68 IN | HEART RATE: 58 BPM | BODY MASS INDEX: 25.61 KG/M2 | OXYGEN SATURATION: 97 % | WEIGHT: 169 LBS | SYSTOLIC BLOOD PRESSURE: 120 MMHG | DIASTOLIC BLOOD PRESSURE: 65 MMHG

## 2024-11-12 DIAGNOSIS — N18.32 CHRONIC KIDNEY DISEASE, STAGE 3B: ICD-10-CM

## 2024-11-12 DIAGNOSIS — E78.5 HYPERLIPIDEMIA, UNSPECIFIED: ICD-10-CM

## 2024-11-12 DIAGNOSIS — I10 ESSENTIAL (PRIMARY) HYPERTENSION: ICD-10-CM

## 2024-11-12 DIAGNOSIS — I50.30 UNSPECIFIED DIASTOLIC (CONGESTIVE) HEART FAILURE: ICD-10-CM

## 2024-11-12 DIAGNOSIS — I63.9 CEREBRAL INFARCTION, UNSPECIFIED: ICD-10-CM

## 2024-11-12 PROCEDURE — 99214 OFFICE O/P EST MOD 30 MIN: CPT

## 2024-11-16 ENCOUNTER — EMERGENCY (EMERGENCY)
Facility: HOSPITAL | Age: 84
LOS: 1 days | Discharge: HOME | End: 2024-11-16
Attending: EMERGENCY MEDICINE | Admitting: EMERGENCY MEDICINE
Payer: MEDICARE

## 2024-11-16 VITALS
TEMPERATURE: 98 F | RESPIRATION RATE: 18 BRPM | OXYGEN SATURATION: 95 % | SYSTOLIC BLOOD PRESSURE: 133 MMHG | DIASTOLIC BLOOD PRESSURE: 73 MMHG | HEART RATE: 67 BPM | HEIGHT: 68 IN

## 2024-11-16 VITALS
DIASTOLIC BLOOD PRESSURE: 79 MMHG | SYSTOLIC BLOOD PRESSURE: 153 MMHG | TEMPERATURE: 97 F | HEART RATE: 55 BPM | RESPIRATION RATE: 18 BRPM | OXYGEN SATURATION: 96 %

## 2024-11-16 DIAGNOSIS — Z98.890 OTHER SPECIFIED POSTPROCEDURAL STATES: Chronic | ICD-10-CM

## 2024-11-16 LAB
ADD ON TEST-SPECIMEN IN LAB: SIGNIFICANT CHANGE UP
ANION GAP SERPL CALC-SCNC: 5 MMOL/L — SIGNIFICANT CHANGE UP (ref 5–17)
APPEARANCE UR: CLEAR — SIGNIFICANT CHANGE UP
BASOPHILS # BLD AUTO: 0.01 K/UL — SIGNIFICANT CHANGE UP (ref 0–0.2)
BASOPHILS NFR BLD AUTO: 0.2 % — SIGNIFICANT CHANGE UP (ref 0–2)
BILIRUB UR-MCNC: NEGATIVE — SIGNIFICANT CHANGE UP
BUN SERPL-MCNC: 42 MG/DL — HIGH (ref 7–23)
CALCIUM SERPL-MCNC: 8.9 MG/DL — SIGNIFICANT CHANGE UP (ref 8.4–10.5)
CHLORIDE SERPL-SCNC: 100 MMOL/L — SIGNIFICANT CHANGE UP (ref 96–108)
CO2 SERPL-SCNC: 36 MMOL/L — HIGH (ref 22–31)
COLOR SPEC: YELLOW — SIGNIFICANT CHANGE UP
CREAT SERPL-MCNC: 2.27 MG/DL — HIGH (ref 0.5–1.3)
DIFF PNL FLD: NEGATIVE — SIGNIFICANT CHANGE UP
EGFR: 27 ML/MIN/1.73M2 — LOW
EOSINOPHIL # BLD AUTO: 0.15 K/UL — SIGNIFICANT CHANGE UP (ref 0–0.5)
EOSINOPHIL NFR BLD AUTO: 3.1 % — SIGNIFICANT CHANGE UP (ref 0–6)
GLUCOSE SERPL-MCNC: 104 MG/DL — HIGH (ref 70–99)
GLUCOSE UR QL: NEGATIVE MG/DL — SIGNIFICANT CHANGE UP
HCT VFR BLD CALC: 38 % — LOW (ref 39–50)
HGB BLD-MCNC: 12 G/DL — LOW (ref 13–17)
IMM GRANULOCYTES NFR BLD AUTO: 0.4 % — SIGNIFICANT CHANGE UP (ref 0–0.9)
KETONES UR-MCNC: NEGATIVE MG/DL — SIGNIFICANT CHANGE UP
LEUKOCYTE ESTERASE UR-ACNC: ABNORMAL
LYMPHOCYTES # BLD AUTO: 0.55 K/UL — LOW (ref 1–3.3)
LYMPHOCYTES # BLD AUTO: 11.5 % — LOW (ref 13–44)
MCHC RBC-ENTMCNC: 29.4 PG — SIGNIFICANT CHANGE UP (ref 27–34)
MCHC RBC-ENTMCNC: 31.6 G/DL — LOW (ref 32–36)
MCV RBC AUTO: 93.1 FL — SIGNIFICANT CHANGE UP (ref 80–100)
MONOCYTES # BLD AUTO: 0.43 K/UL — SIGNIFICANT CHANGE UP (ref 0–0.9)
MONOCYTES NFR BLD AUTO: 9 % — SIGNIFICANT CHANGE UP (ref 2–14)
NEUTROPHILS # BLD AUTO: 3.64 K/UL — SIGNIFICANT CHANGE UP (ref 1.8–7.4)
NEUTROPHILS NFR BLD AUTO: 75.8 % — SIGNIFICANT CHANGE UP (ref 43–77)
NITRITE UR-MCNC: NEGATIVE — SIGNIFICANT CHANGE UP
NRBC # BLD: 0 /100 WBCS — SIGNIFICANT CHANGE UP (ref 0–0)
PH UR: 6.5 — SIGNIFICANT CHANGE UP (ref 5–8)
PLATELET # BLD AUTO: 161 K/UL — SIGNIFICANT CHANGE UP (ref 150–400)
POTASSIUM SERPL-MCNC: 5.3 MMOL/L — SIGNIFICANT CHANGE UP (ref 3.5–5.3)
POTASSIUM SERPL-SCNC: 5.3 MMOL/L — SIGNIFICANT CHANGE UP (ref 3.5–5.3)
PROT UR-MCNC: 100 MG/DL
RBC # BLD: 4.08 M/UL — LOW (ref 4.2–5.8)
RBC # FLD: 14 % — SIGNIFICANT CHANGE UP (ref 10.3–14.5)
SODIUM SERPL-SCNC: 141 MMOL/L — SIGNIFICANT CHANGE UP (ref 135–145)
SP GR SPEC: 1.01 — SIGNIFICANT CHANGE UP (ref 1–1.03)
TROPONIN T, HIGH SENSITIVITY RESULT: 74 NG/L — CRITICAL HIGH (ref 0–51)
UROBILINOGEN FLD QL: 0.2 MG/DL — SIGNIFICANT CHANGE UP (ref 0.2–1)
WBC # BLD: 4.8 K/UL — SIGNIFICANT CHANGE UP (ref 3.8–10.5)
WBC # FLD AUTO: 4.8 K/UL — SIGNIFICANT CHANGE UP (ref 3.8–10.5)

## 2024-11-16 PROCEDURE — 93005 ELECTROCARDIOGRAM TRACING: CPT

## 2024-11-16 PROCEDURE — 85025 COMPLETE CBC W/AUTO DIFF WBC: CPT

## 2024-11-16 PROCEDURE — 36415 COLL VENOUS BLD VENIPUNCTURE: CPT

## 2024-11-16 PROCEDURE — 70450 CT HEAD/BRAIN W/O DYE: CPT | Mod: MC

## 2024-11-16 PROCEDURE — 99285 EMERGENCY DEPT VISIT HI MDM: CPT | Mod: 25

## 2024-11-16 PROCEDURE — 70450 CT HEAD/BRAIN W/O DYE: CPT | Mod: 26,MC

## 2024-11-16 PROCEDURE — 81001 URINALYSIS AUTO W/SCOPE: CPT

## 2024-11-16 PROCEDURE — 84484 ASSAY OF TROPONIN QUANT: CPT

## 2024-11-16 PROCEDURE — 99285 EMERGENCY DEPT VISIT HI MDM: CPT

## 2024-11-16 PROCEDURE — 80048 BASIC METABOLIC PNL TOTAL CA: CPT

## 2024-11-16 RX ORDER — SODIUM CHLORIDE 9 MG/ML
500 INJECTION, SOLUTION INTRAMUSCULAR; INTRAVENOUS; SUBCUTANEOUS ONCE
Refills: 0 | Status: COMPLETED | OUTPATIENT
Start: 2024-11-16 | End: 2024-11-16

## 2024-11-16 RX ADMIN — SODIUM CHLORIDE 500 MILLILITER(S): 9 INJECTION, SOLUTION INTRAMUSCULAR; INTRAVENOUS; SUBCUTANEOUS at 17:24

## 2024-11-16 NOTE — ED ADULT NURSE NOTE - NSFALLHARMRISKINTERV_ED_ALL_ED

## 2024-11-16 NOTE — ED PROVIDER NOTE - CLINICAL SUMMARY MEDICAL DECISION MAKING FREE TEXT BOX
Impression:  83yo m, h/o CHF, HTN, HLD, CAD, CKD, GERD, anxiety/depression, recently admitted for leg cellulitis (no longer on abx) w/ hosp course c/b HF exacerbation which improved w/ iv lasix, with darleen on ckd, who was bib family for c/o dizziness and wooziness this morning. Pt took his usual meds at 8:30a today, and began to feel dizzy/ lightheaded starting at 11:30a. + ha ealrier though none currently and dizziness is much improved. Denies other complaints cp, sob, palp, syncope, n/t/w, nausea, vomiting, diarrhea, dysuria, hematuria, uri sx's, fever, chills... Pt has poor appetite at baseline and drinks ensure. Impression:  85yo m, h/o CHF, HTN, HLD, CAD, CKD, GERD, anxiety/depression, recently admitted for leg cellulitis (no longer on abx) w/ hosp course c/b HF exacerbation which improved w/ iv lasix, with darleen on ckd, who was bib family for c/o dizziness and wooziness this morning. Pt took his usual meds at 8:30a today, and began to feel dizzy/ lightheaded starting at 11:30a. + ha ealrier though none currently and dizziness is much improved. Denies other complaints cp, sob, palp, syncope, n/t/w, nausea, vomiting, diarrhea, dysuria, hematuria, uri sx's, fever, chills... Pt has poor appetite at baseline and drinks ensure.    Afebrile. HDS. Pt is well appearing, responds when spoken to, is at baseline mental status. Neuro exam non-focal. Labs reassuring w/ normal wbc, slight elev of cr compared to baseline, neg ag, CO2 chronically elev. Trop 73->74 (also chronically elev likely due to renal dz). EKG showing nsr, no stemi, no dynamic changes on repeat. UA neg for infection. CT head neg for acute infarct/ bleed. ED evaluation and management discussed with the patient and family in detail.  Pt also seen by KURT. Pt has appt w/ his PCP on 11/19- pt advised on f/u w/ PMD as scheduled. Strict ED return instructions discussed in detail and patient given the opportunity to ask any questions about their discharge diagnosis and instructions. Patient verbalized understanding.

## 2024-11-16 NOTE — ED PROVIDER NOTE - PHYSICAL EXAMINATION
VITAL SIGNS: I have reviewed nursing notes and confirm.  CONSTITUTIONAL: Well appearing, in no acute distress.   SKIN:  warm and dry, no acute rash.   HEAD:  normocephalic, atraumatic.  EYES: EOM intact; conjunctiva and sclera clear.  ENT: No nasal discharge; airway clear.   NECK: Supple.  CARD: S1, S2, Regular rate and rhythm.   RESP:  Clear to auscultation b/l, no wheezes, rales or rhonchi.  ABD: Normal bowel sounds; soft; non-distended; non-tender; no guarding/ rebound.  EXT: No peripheral edema. Pulses intact and equal b/l.  NEURO: Alert, oriented, at baseline mental status, CN, grossly intact. Motor/ sensation intact and equal b/l. VITAL SIGNS: I have reviewed nursing notes and confirm.  CONSTITUTIONAL: Elderly m in no acute distress.   SKIN:  warm and dry, no acute rash.   HEAD:  normocephalic, atraumatic.  EYES: EOM intact; conjunctiva and sclera clear.  ENT: No nasal discharge; airway clear.   NECK: Supple.  CARD: S1, S2, Regular rate and rhythm.   RESP:  Clear to auscultation b/l, no wheezes, rales or rhonchi.  ABD: Normal bowel sounds; soft; non-distended; non-tender; no guarding/ rebound.  EXT: B/l lower ext in henrique boots. Pulses intact and equal b/l.  NEURO: Alert, oriented, at baseline mental status, CN, grossly intact. Motor/ sensation intact and equal b/l.

## 2024-11-16 NOTE — ED PROVIDER NOTE - PATIENT PORTAL LINK FT
You can access the FollowMyHealth Patient Portal offered by [facility] by registering at the following website: [email or web address]. By joining [employer]’s FollowMyHealth portal, you will also be able to view your health information using other applications (apps) compatible with our system.

## 2024-11-16 NOTE — ED PROVIDER NOTE - NSFOLLOWUPINSTRUCTIONS_ED_ALL_ED_FT
Please continue with your current medications.  Follow up with your primary care physician as scheduled on November 19th.   Return to er for any new or worsening symptoms.    Dizziness    AMBULATORY CARE:    Dizziness is a feeling of being off balance or unsteady. Common causes of dizziness are an inner ear fluid imbalance or a lack of oxygen in your blood. Dizziness may be acute (lasts 3 days or less) or chronic (lasts longer than 3 days). You may have dizzy spells that last from seconds to a few hours.    Common symptoms that may happen with dizziness:    A feeling that your surroundings are moving even though you are standing still    Ringing in your ears or hearing loss    Feeling faint or lightheaded    Weakness or unsteadiness    Double vision or eye movements you cannot control    Nausea or vomiting    Confusion  Seek care immediately if:    You have a headache and a stiff neck.    You have shaking chills and a fever.    You vomit over and over with no relief.    Your vomit or bowel movements are red or black.    You have pain in your chest, back, or abdomen.    You have numbness, especially in your face, arms, or legs.    You have trouble moving your arms or legs.    You are confused.  Contact your healthcare provider if:    You have a fever.    Your symptoms do not get better with treatment.    You have questions or concerns about your condition or care.  Treatment for dizziness depends on the cause. Your healthcare provider may give you oxygen or medicines to decrease your dizziness and nausea. Your provider may also refer you to a specialist. You may need to be admitted to the hospital for treatment.    Manage your symptoms:    Do not drive or operate heavy machinery when you are dizzy.    Get up slowly from sitting or lying down.    Drink plenty of liquids. Liquids help prevent dehydration. Ask how much liquid to drink each day and which liquids are best for you.  Follow up with your doctor as directed: Write down your questions so you remember to ask them during your visits.    © [employer] 1973, 2024

## 2024-11-16 NOTE — ED ADULT TRIAGE NOTE - CHIEF COMPLAINT QUOTE
+ headache and feeling "a little woozy" s/p taking daily medications this morning, unsure which are new, now improved but not back to baseline   + low appetite and intake x weeks  Recent admission for cellulitis to legs, baseline on 2L nc since DC    AOx4, with 24hr care from home, with gradnddaughter/caretaker

## 2024-11-16 NOTE — ED PROVIDER NOTE - OBJECTIVE STATEMENT
Pt is an 85yo m, h/o CHF, HTN, HLD, CAD, CKD, GERD, anxiety/depression, recently admitted for leg cellulitis (no longer on abx) w/ hosp course c/b HF exacerbation which improved w/ iv lasix, with darleen on ckd, who was bib family for c/o dizziness and wooziness this morning. Pt took his usual meds at 8:30a today, and began to feel dizzy/ lightheaded starting at 11:30a. + ha ealrier though none currently and dizziness is much improved. Denies other complaints cp, sob, palp, syncope, n/t/w, nausea, vomiting, diarrhea, dysuria, hematuria, uri sx's, fever, chills... Pt has poor appetite at baseline and drinks ensure.

## 2024-11-16 NOTE — ED ADULT NURSE NOTE - OBJECTIVE STATEMENT
as per grand-daughter, patient was complaining of feeling woozy earlier with headache and he has not been eating well for "a long time." pt currently reports no discomfort,

## 2024-11-16 NOTE — CHART NOTE - NSCHARTNOTEFT_GEN_A_CORE
SW met with patient, patient's daughter and patient's granddaughter at bedside. Patient's family expressed concerns over caring for patient at home in the mornings (patient's family works during the day and patient is often alone in the morning). Patient pays his daughter privately to provide care for him. SW educated patient/family on applying for Medicaid and CDPAP services so that patient's assistance at home could be covered by Medicaid. SW provided patient/family with printed information on [facility]/Geriatric Case Management services for assistance with Medicaid application. Patient's family verbalized understanding of resource and denied having any further SW needs at this time.

## 2024-11-20 DIAGNOSIS — I45.10 UNSPECIFIED RIGHT BUNDLE-BRANCH BLOCK: ICD-10-CM

## 2024-11-20 DIAGNOSIS — K21.9 GASTRO-ESOPHAGEAL REFLUX DISEASE WITHOUT ESOPHAGITIS: ICD-10-CM

## 2024-11-20 DIAGNOSIS — I25.10 ATHEROSCLEROTIC HEART DISEASE OF NATIVE CORONARY ARTERY WITHOUT ANGINA PECTORIS: ICD-10-CM

## 2024-11-20 DIAGNOSIS — R42 DIZZINESS AND GIDDINESS: ICD-10-CM

## 2024-11-20 DIAGNOSIS — E78.5 HYPERLIPIDEMIA, UNSPECIFIED: ICD-10-CM

## 2024-11-20 DIAGNOSIS — R00.1 BRADYCARDIA, UNSPECIFIED: ICD-10-CM

## 2024-11-20 DIAGNOSIS — F32.A DEPRESSION, UNSPECIFIED: ICD-10-CM

## 2024-11-20 DIAGNOSIS — F41.9 ANXIETY DISORDER, UNSPECIFIED: ICD-10-CM

## 2024-11-20 DIAGNOSIS — I50.9 HEART FAILURE, UNSPECIFIED: ICD-10-CM

## 2024-11-26 PROCEDURE — 87150 DNA/RNA AMPLIFIED PROBE: CPT

## 2024-11-26 PROCEDURE — 93970 EXTREMITY STUDY: CPT

## 2024-11-26 PROCEDURE — 83036 HEMOGLOBIN GLYCOSYLATED A1C: CPT

## 2024-11-26 PROCEDURE — 80053 COMPREHEN METABOLIC PANEL: CPT

## 2024-11-26 PROCEDURE — 81001 URINALYSIS AUTO W/SCOPE: CPT

## 2024-11-26 PROCEDURE — 84443 ASSAY THYROID STIM HORMONE: CPT

## 2024-11-26 PROCEDURE — 96374 THER/PROPH/DIAG INJ IV PUSH: CPT

## 2024-11-26 PROCEDURE — 84100 ASSAY OF PHOSPHORUS: CPT

## 2024-11-26 PROCEDURE — 97116 GAIT TRAINING THERAPY: CPT

## 2024-11-26 PROCEDURE — 94660 CPAP INITIATION&MGMT: CPT

## 2024-11-26 PROCEDURE — 96375 TX/PRO/DX INJ NEW DRUG ADDON: CPT

## 2024-11-26 PROCEDURE — 80048 BASIC METABOLIC PNL TOTAL CA: CPT

## 2024-11-26 PROCEDURE — 87040 BLOOD CULTURE FOR BACTERIA: CPT

## 2024-11-26 PROCEDURE — 80307 DRUG TEST PRSMV CHEM ANLYZR: CPT

## 2024-11-26 PROCEDURE — 97165 OT EVAL LOW COMPLEX 30 MIN: CPT

## 2024-11-26 PROCEDURE — 80076 HEPATIC FUNCTION PANEL: CPT

## 2024-11-26 PROCEDURE — 86140 C-REACTIVE PROTEIN: CPT

## 2024-11-26 PROCEDURE — 97110 THERAPEUTIC EXERCISES: CPT

## 2024-11-26 PROCEDURE — 97530 THERAPEUTIC ACTIVITIES: CPT

## 2024-11-26 PROCEDURE — 76775 US EXAM ABDO BACK WALL LIM: CPT

## 2024-11-26 PROCEDURE — 80061 LIPID PANEL: CPT

## 2024-11-26 PROCEDURE — 93306 TTE W/DOPPLER COMPLETE: CPT

## 2024-11-26 PROCEDURE — 99285 EMERGENCY DEPT VISIT HI MDM: CPT | Mod: 25

## 2024-11-26 PROCEDURE — 83735 ASSAY OF MAGNESIUM: CPT

## 2024-11-26 PROCEDURE — 87077 CULTURE AEROBIC IDENTIFY: CPT

## 2024-11-26 PROCEDURE — 71045 X-RAY EXAM CHEST 1 VIEW: CPT

## 2024-11-26 PROCEDURE — 82550 ASSAY OF CK (CPK): CPT

## 2024-11-26 PROCEDURE — 82570 ASSAY OF URINE CREATININE: CPT

## 2024-11-26 PROCEDURE — 85379 FIBRIN DEGRADATION QUANT: CPT

## 2024-11-26 PROCEDURE — 36415 COLL VENOUS BLD VENIPUNCTURE: CPT

## 2024-11-26 PROCEDURE — 85025 COMPLETE CBC W/AUTO DIFF WBC: CPT

## 2024-11-26 PROCEDURE — 84133 ASSAY OF URINE POTASSIUM: CPT

## 2024-11-26 PROCEDURE — 84540 ASSAY OF URINE/UREA-N: CPT

## 2024-11-26 PROCEDURE — 82553 CREATINE MB FRACTION: CPT

## 2024-11-26 PROCEDURE — 97161 PT EVAL LOW COMPLEX 20 MIN: CPT

## 2024-11-26 PROCEDURE — 93005 ELECTROCARDIOGRAM TRACING: CPT

## 2024-11-26 PROCEDURE — 0225U NFCT DS DNA&RNA 21 SARSCOV2: CPT

## 2024-11-26 PROCEDURE — 83605 ASSAY OF LACTIC ACID: CPT

## 2024-11-26 PROCEDURE — 84484 ASSAY OF TROPONIN QUANT: CPT

## 2024-11-26 PROCEDURE — 83935 ASSAY OF URINE OSMOLALITY: CPT

## 2024-11-26 PROCEDURE — 85652 RBC SED RATE AUTOMATED: CPT

## 2024-11-26 PROCEDURE — 84300 ASSAY OF URINE SODIUM: CPT

## 2024-11-26 PROCEDURE — 84156 ASSAY OF PROTEIN URINE: CPT

## 2024-11-26 PROCEDURE — 87186 SC STD MICRODIL/AGAR DIL: CPT

## 2024-11-26 PROCEDURE — 83880 ASSAY OF NATRIURETIC PEPTIDE: CPT

## 2025-04-15 ENCOUNTER — APPOINTMENT (OUTPATIENT)
Dept: HEART AND VASCULAR | Facility: CLINIC | Age: 85
End: 2025-04-15